# Patient Record
Sex: MALE | Race: WHITE
[De-identification: names, ages, dates, MRNs, and addresses within clinical notes are randomized per-mention and may not be internally consistent; named-entity substitution may affect disease eponyms.]

---

## 2019-11-21 ENCOUNTER — HOSPITAL ENCOUNTER (INPATIENT)
Dept: HOSPITAL 63 - ER | Age: 69
LOS: 67 days | Discharge: NURSING, REHAB OR LONG-TERM CARE | DRG: 885 | End: 2020-01-27
Attending: PSYCHIATRY & NEUROLOGY | Admitting: PSYCHIATRY & NEUROLOGY
Payer: MEDICARE

## 2019-11-21 VITALS — BODY MASS INDEX: 23.98 KG/M2 | WEIGHT: 177 LBS | HEIGHT: 72 IN

## 2019-11-21 DIAGNOSIS — Z88.8: ICD-10-CM

## 2019-11-21 DIAGNOSIS — Z79.899: ICD-10-CM

## 2019-11-21 DIAGNOSIS — K21.9: ICD-10-CM

## 2019-11-21 DIAGNOSIS — I10: ICD-10-CM
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
## 2019-11-21 DIAGNOSIS — F63.9: ICD-10-CM

## 2019-11-21 DIAGNOSIS — G40.909: ICD-10-CM

## 2019-11-21 DIAGNOSIS — F02.81: ICD-10-CM

## 2019-11-21 DIAGNOSIS — G31.83: ICD-10-CM

## 2019-11-21 DIAGNOSIS — G30.9: ICD-10-CM
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
## 2019-11-21 DIAGNOSIS — F32.9: ICD-10-CM

## 2019-11-21 DIAGNOSIS — F01.51: ICD-10-CM

## 2019-11-21 DIAGNOSIS — F29: Primary | ICD-10-CM

## 2019-11-21 DIAGNOSIS — M19.90: ICD-10-CM
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
## 2019-11-21 DIAGNOSIS — Z88.1: ICD-10-CM

## 2019-11-21 DIAGNOSIS — E78.5: ICD-10-CM

## 2019-11-21 DIAGNOSIS — E03.9: ICD-10-CM

## 2019-11-21 DIAGNOSIS — F41.1: ICD-10-CM

## 2019-11-21 DIAGNOSIS — G47.33: ICD-10-CM
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
## 2019-11-21 LAB
ALBUMIN SERPL-MCNC: 3.4 G/DL (ref 3.4–5)
ALBUMIN/GLOB SERPL: 1.2 {RATIO} (ref 1–1.7)
ALP SERPL-CCNC: 77 U/L (ref 46–116)
ALT SERPL-CCNC: < 6 U/L (ref 16–63)
ANION GAP SERPL CALC-SCNC: 7 MMOL/L (ref 6–14)
APTT PPP: YELLOW S
AST SERPL-CCNC: 11 U/L (ref 15–37)
BACTERIA #/AREA URNS HPF: 0 /HPF
BASOPHILS # BLD AUTO: 0 X10^3/UL (ref 0–0.2)
BASOPHILS NFR BLD: 1 % (ref 0–3)
BILIRUB SERPL-MCNC: 0.4 MG/DL (ref 0.2–1)
BILIRUB UR QL STRIP: (no result)
BUN/CREAT SERPL: 29 (ref 6–20)
CA-I SERPL ISE-MCNC: 23 MG/DL (ref 8–26)
CALCIUM SERPL-MCNC: 8.7 MG/DL (ref 8.5–10.1)
CHLORIDE SERPL-SCNC: 108 MMOL/L (ref 98–107)
CO2 SERPL-SCNC: 29 MMOL/L (ref 21–32)
CREAT SERPL-MCNC: 0.8 MG/DL (ref 0.7–1.3)
EOSINOPHIL NFR BLD: 0.4 X10^3/UL (ref 0–0.7)
EOSINOPHIL NFR BLD: 8 % (ref 0–3)
ERYTHROCYTE [DISTWIDTH] IN BLOOD BY AUTOMATED COUNT: 13.9 % (ref 11.5–14.5)
FIBRINOGEN PPP-MCNC: CLEAR MG/DL
GFR SERPLBLD BASED ON 1.73 SQ M-ARVRAT: 96.4 ML/MIN
GLOBULIN SER-MCNC: 2.8 G/DL (ref 2.2–3.8)
GLUCOSE SERPL-MCNC: 95 MG/DL (ref 70–99)
GLUCOSE UR STRIP-MCNC: (no result) MG/DL
HCT VFR BLD CALC: 38.2 % (ref 39–53)
HGB BLD-MCNC: 12.7 G/DL (ref 13–17.5)
LYMPHOCYTES # BLD: 0.8 X10^3/UL (ref 1–4.8)
LYMPHOCYTES NFR BLD AUTO: 18 % (ref 24–48)
MAGNESIUM SERPL-MCNC: 2 MG/DL (ref 1.8–2.4)
MCH RBC QN AUTO: 30 PG (ref 25–35)
MCHC RBC AUTO-ENTMCNC: 33 G/DL (ref 31–37)
MCV RBC AUTO: 90 FL (ref 79–100)
MONO #: 0.4 X10^3/UL (ref 0–1.1)
MONOCYTES NFR BLD: 10 % (ref 0–9)
NEUT #: 3 X10^3UL (ref 1.8–7.7)
NEUTROPHILS NFR BLD AUTO: 64 % (ref 31–73)
NITRITE UR QL STRIP: (no result)
PLATELET # BLD AUTO: 176 X10^3/UL (ref 140–400)
POTASSIUM SERPL-SCNC: 4.4 MMOL/L (ref 3.5–5.1)
PROT SERPL-MCNC: 6.2 G/DL (ref 6.4–8.2)
RBC # BLD AUTO: 4.25 X10^6/UL (ref 4.3–5.7)
RBC #/AREA URNS HPF: 0 /HPF (ref 0–2)
SODIUM SERPL-SCNC: 144 MMOL/L (ref 136–145)
SP GR UR STRIP: 1.02
UROBILINOGEN UR-MCNC: 0.2 MG/DL
WBC # BLD AUTO: 4.7 X10^3/UL (ref 4–11)
WBC #/AREA URNS HPF: 0 /HPF (ref 0–4)

## 2019-11-21 PROCEDURE — 86592 SYPHILIS TEST NON-TREP QUAL: CPT

## 2019-11-21 PROCEDURE — 85025 COMPLETE CBC W/AUTO DIFF WBC: CPT

## 2019-11-21 PROCEDURE — 36415 COLL VENOUS BLD VENIPUNCTURE: CPT

## 2019-11-21 PROCEDURE — 70450 CT HEAD/BRAIN W/O DYE: CPT

## 2019-11-21 PROCEDURE — 80053 COMPREHEN METABOLIC PANEL: CPT

## 2019-11-21 PROCEDURE — 83735 ASSAY OF MAGNESIUM: CPT

## 2019-11-21 PROCEDURE — 80164 ASSAY DIPROPYLACETIC ACD TOT: CPT

## 2019-11-21 PROCEDURE — 83550 IRON BINDING TEST: CPT

## 2019-11-21 PROCEDURE — 84480 ASSAY TRIIODOTHYRONINE (T3): CPT

## 2019-11-21 PROCEDURE — 83036 HEMOGLOBIN GLYCOSYLATED A1C: CPT

## 2019-11-21 PROCEDURE — 84443 ASSAY THYROID STIM HORMONE: CPT

## 2019-11-21 PROCEDURE — 82306 VITAMIN D 25 HYDROXY: CPT

## 2019-11-21 PROCEDURE — 80061 LIPID PANEL: CPT

## 2019-11-21 PROCEDURE — 82607 VITAMIN B-12: CPT

## 2019-11-21 PROCEDURE — 93005 ELECTROCARDIOGRAM TRACING: CPT

## 2019-11-21 PROCEDURE — 84436 ASSAY OF TOTAL THYROXINE: CPT

## 2019-11-21 PROCEDURE — 83540 ASSAY OF IRON: CPT

## 2019-11-21 PROCEDURE — 86021 WBC ANTIBODY IDENTIFICATION: CPT

## 2019-11-21 PROCEDURE — 81001 URINALYSIS AUTO W/SCOPE: CPT

## 2019-11-21 RX ADMIN — CARBIDOPA AND LEVODOPA SCH TAB: 25; 100 TABLET ORAL at 22:00

## 2019-11-21 RX ADMIN — MELATONIN TAB 3 MG SCH MG: 3 TAB at 22:00

## 2019-11-21 RX ADMIN — POLYETHYLENE GLYCOL 3350 SCH GM: 17 POWDER, FOR SOLUTION ORAL at 22:00

## 2019-11-21 RX ADMIN — QUETIAPINE FUMARATE SCH MG: 100 TABLET, FILM COATED ORAL at 22:00

## 2019-11-21 RX ADMIN — CARBIDOPA AND LEVODOPA SCH TAB.SA: 50; 200 TABLET, EXTENDED RELEASE ORAL at 22:00
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
## 2019-11-22 VITALS — DIASTOLIC BLOOD PRESSURE: 106 MMHG | SYSTOLIC BLOOD PRESSURE: 169 MMHG

## 2019-11-22 VITALS — SYSTOLIC BLOOD PRESSURE: 162 MMHG | DIASTOLIC BLOOD PRESSURE: 100 MMHG

## 2019-11-22 LAB
CHOLEST/HDLC SERPL: 3 {RATIO}
HDLC SERPL-MCNC: 49 MG/DL (ref 40–60)
LDLC: 94 MG/DL (ref 0–100)
T3 SERPL-MCNC: 88 NG/DL (ref 71–180)
T4 SERPL-MCNC: 4.8 UG/DL (ref 4.5–12)
THYROID STIM HORMONE (TSH): 0.64 UIU/ML (ref 0.36–3.74)
TRIGL SERPL-MCNC: 70 MG/DL (ref 0–150)
VLDLC: 14 MG/DL (ref 0–40)

## 2019-11-22 RX ADMIN — VITAMIN D, TAB 1000IU (100/BT) SCH UNIT: 25 TAB at 09:00

## 2019-11-22 RX ADMIN — QUETIAPINE FUMARATE SCH MG: 25 TABLET, FILM COATED ORAL at 09:00

## 2019-11-22 RX ADMIN — CARBIDOPA AND LEVODOPA SCH TAB.SA: 50; 200 TABLET, EXTENDED RELEASE ORAL at 20:57

## 2019-11-22 RX ADMIN — DONEPEZIL HYDROCHLORIDE SCH MG: 5 TABLET, FILM COATED ORAL at 09:00

## 2019-11-22 RX ADMIN — POLYETHYLENE GLYCOL 3350 SCH GM: 17 POWDER, FOR SOLUTION ORAL at 09:00

## 2019-11-22 RX ADMIN — CARBIDOPA AND LEVODOPA SCH TAB: 25; 100 TABLET ORAL at 10:00

## 2019-11-22 RX ADMIN — POLYETHYLENE GLYCOL 3350 SCH GM: 17 POWDER, FOR SOLUTION ORAL at 20:57

## 2019-11-22 RX ADMIN — CARBIDOPA AND LEVODOPA SCH TAB: 25; 100 TABLET ORAL at 14:00

## 2019-11-22 RX ADMIN — CARBIDOPA AND LEVODOPA SCH TAB: 25; 100 TABLET ORAL at 05:30

## 2019-11-22 RX ADMIN — MELATONIN TAB 3 MG SCH MG: 3 TAB at 20:57

## 2019-11-22 RX ADMIN — Medication SCH MCG: at 09:00

## 2019-11-22 RX ADMIN — ENTACAPONE SCH MG: 200 TABLET, FILM COATED ORAL at 20:57

## 2019-11-22 RX ADMIN — CARBIDOPA AND LEVODOPA SCH TAB: 25; 100 TABLET ORAL at 22:00

## 2019-11-22 RX ADMIN — CARBIDOPA AND LEVODOPA SCH TAB.SA: 50; 200 TABLET, EXTENDED RELEASE ORAL at 09:00

## 2019-11-22 RX ADMIN — Medication SCH MG: at 09:00

## 2019-11-22 RX ADMIN — QUETIAPINE FUMARATE SCH MG: 100 TABLET, FILM COATED ORAL at 20:57

## 2019-11-22 RX ADMIN — QUETIAPINE FUMARATE SCH MG: 25 TABLET, FILM COATED ORAL at 14:00

## 2019-11-22 RX ADMIN — CARBIDOPA AND LEVODOPA SCH TAB: 25; 100 TABLET ORAL at 16:57

## 2019-11-22 RX ADMIN — FINASTERIDE SCH MG: 5 TABLET, FILM COATED ORAL at 09:00

## 2019-11-22 RX ADMIN — MELOXICAM SCH MG: 7.5 TABLET ORAL at 09:00

## 2019-11-22 RX ADMIN — CITALOPRAM HYDROBROMIDE SCH MG: 20 TABLET ORAL at 09:00

## 2019-11-22 RX ADMIN — ENTACAPONE SCH MG: 200 TABLET, FILM COATED ORAL at 09:00

## 2019-11-22 NOTE — PDOC
Exam


Note:


Aren Note:


Please also refer to the separate dictated note~for this date of service 

dictated separately. Discussed the patient with Nursing staff reviewed the 

chart.~Reviewed interim history and current functioning. Reviewed vital 

signs,~Labs/ Radiology~and current medications noted below. Continue current 

treatment with the changes noted in the dictated addendum note





Assessment:


Vital Signs/I&O:





                                   Vital Signs








  Date Time  Temp Pulse Resp B/P (MAP) Pulse Ox O2 Delivery O2 Flow Rate FiO2


 


11/22/19 09:00  83  169/106    


 


11/22/19 06:31 97.2  18  98   


 


11/21/19 18:35      Room Air  














                                    I & O   


 


 11/21/19 11/21/19 11/22/19





 15:00 23:00 07:00


 


Intake Total  120 ml 


 


Balance  120 ml 











Current Medications:


Meds:





Current Medications








 Medications


  (Trade)  Dose


 Ordered  Sig/Elijah


 Route


 PRN Reason  Start Time


 Stop Time Status Last Admin


Dose Admin


 


 Olanzapine


  (ZyPREXA ZYDIS)  2.5 mg  PRN Q2HR  PRN


 PO


 PSYCHOSIS  11/22/19 07:45


    11/22/19 15:02





 


 Lorazepam


  (Ativan Inj)  2 mg  DAILY


 IVP


   11/23/19 09:00


    11/22/19 09:05





 


 Lorazepam


  (Ativan Inj)  1 mg  1X  ONCE


 IM


   11/22/19 09:30


 11/22/19 09:31 DC 11/22/19 09:30











I have reviewed the current psychotropics carefully including drug interactions.

 Risk benefit ratio favors no change other than as noted in my dictated progress

note.





Diagnosis:


Problems:  


(1) Anxiety disorder


(2) Impulse control disorder


(3) Lewy body dementia with behavioral disturbance


(4) Major neurocognitive disorder, due to vascular disease, with behavioral 

disturbance, mild











MIRIAM JACOBS MD                 Nov 22, 2019 20:42

## 2019-11-23 VITALS — SYSTOLIC BLOOD PRESSURE: 136 MMHG | DIASTOLIC BLOOD PRESSURE: 83 MMHG

## 2019-11-23 VITALS — DIASTOLIC BLOOD PRESSURE: 89 MMHG | SYSTOLIC BLOOD PRESSURE: 144 MMHG

## 2019-11-23 LAB — HBA1C MFR BLD: 5.3 % (ref 4.8–5.6)

## 2019-11-23 RX ADMIN — TRAZODONE HYDROCHLORIDE SCH MG: 100 TABLET ORAL at 21:31

## 2019-11-23 RX ADMIN — QUETIAPINE FUMARATE SCH MG: 100 TABLET, FILM COATED ORAL at 21:30

## 2019-11-23 RX ADMIN — CARBIDOPA AND LEVODOPA SCH TAB: 25; 100 TABLET ORAL at 17:00

## 2019-11-23 RX ADMIN — DONEPEZIL HYDROCHLORIDE SCH MG: 5 TABLET, FILM COATED ORAL at 08:07

## 2019-11-23 RX ADMIN — Medication SCH MCG: at 08:08

## 2019-11-23 RX ADMIN — MELATONIN TAB 3 MG SCH MG: 3 TAB at 21:31

## 2019-11-23 RX ADMIN — Medication SCH MG: at 09:00

## 2019-11-23 RX ADMIN — CITALOPRAM HYDROBROMIDE SCH MG: 20 TABLET ORAL at 08:08

## 2019-11-23 RX ADMIN — VITAMIN D, TAB 1000IU (100/BT) SCH UNIT: 25 TAB at 08:07

## 2019-11-23 RX ADMIN — CARBIDOPA AND LEVODOPA SCH TAB: 25; 100 TABLET ORAL at 08:12

## 2019-11-23 RX ADMIN — CARBIDOPA AND LEVODOPA SCH TAB: 25; 100 TABLET ORAL at 06:00

## 2019-11-23 RX ADMIN — FINASTERIDE SCH MG: 5 TABLET, FILM COATED ORAL at 08:07

## 2019-11-23 RX ADMIN — ENTACAPONE SCH MG: 200 TABLET, FILM COATED ORAL at 21:52

## 2019-11-23 RX ADMIN — CARBIDOPA AND LEVODOPA SCH TAB.SA: 50; 200 TABLET, EXTENDED RELEASE ORAL at 21:31

## 2019-11-23 RX ADMIN — POLYETHYLENE GLYCOL 3350 SCH GM: 17 POWDER, FOR SOLUTION ORAL at 08:06

## 2019-11-23 RX ADMIN — QUETIAPINE FUMARATE SCH MG: 25 TABLET, FILM COATED ORAL at 08:06

## 2019-11-23 RX ADMIN — QUETIAPINE FUMARATE SCH MG: 25 TABLET, FILM COATED ORAL at 13:47

## 2019-11-23 RX ADMIN — CARBIDOPA AND LEVODOPA SCH TAB: 25; 100 TABLET ORAL at 21:35

## 2019-11-23 RX ADMIN — CARBIDOPA AND LEVODOPA SCH TAB.SA: 50; 200 TABLET, EXTENDED RELEASE ORAL at 08:08

## 2019-11-23 RX ADMIN — POLYETHYLENE GLYCOL 3350 SCH GM: 17 POWDER, FOR SOLUTION ORAL at 21:52

## 2019-11-23 RX ADMIN — MELOXICAM SCH MG: 7.5 TABLET ORAL at 08:08

## 2019-11-23 RX ADMIN — CARBIDOPA AND LEVODOPA SCH TAB: 25; 100 TABLET ORAL at 13:47

## 2019-11-23 RX ADMIN — ENTACAPONE SCH MG: 200 TABLET, FILM COATED ORAL at 08:08

## 2019-11-23 NOTE — PDOC
Exam


Note:


Aren Note:


Please also refer to the separate dictated note~for this date of service 

dictated separately.~Patient seen individually. Discussed the patient with 

Nursing staff reviewed the chart.~Reviewed interim history and current 

functioning. Reviewed vital signs,~Labs/ Radiology~and current medications noted

below. Continue current treatment with the changes noted in the dictated 

addendum note





Assessment:


Vital Signs/I&O:





                                   Vital Signs








  Date Time  Temp Pulse Resp B/P (MAP) Pulse Ox O2 Delivery O2 Flow Rate FiO2


 


11/23/19 17:00  80  136/83    


 


11/23/19 16:04 97.7  14  98   


 


11/23/19 06:08      Room Air  














                                    I & O   


 


 11/22/19 11/22/19 11/23/19





 15:00 23:00 07:00


 


Intake Total 0 ml 0 ml 


 


Balance 0 ml 0 ml 











Current Medications:


Meds:





Current Medications








 Medications


  (Trade)  Dose


 Ordered  Sig/Elijah


 Route


 PRN Reason  Start Time


 Stop Time Status Last Admin


Dose Admin


 


 Lorazepam


  (Ativan Inj)  2 mg  DAILY


 IVP


   11/23/19 09:00


 11/23/19 18:56 DC 11/22/19 09:05





 


 Lorazepam


  (Ativan Inj)  2 mg  1X  ONCE


 IM


   11/23/19 09:00


 11/23/19 09:33 DC 11/23/19 09:00





 


 Lorazepam


  (Ativan Inj)  1 mg  BID


 IVP


   11/23/19 19:00


 11/23/19 19:06 DC 11/23/19 19:00





 


 Haloperidol


 Lactate


  (Haldol)  5 mg  1X  ONCE


 IM


   11/23/19 19:00


 11/23/19 19:03 DC 11/23/19 19:00











I have reviewed the current psychotropics carefully including drug interactions.

 Risk benefit ratio favors no change other than as noted in my dictated progress

note.





Diagnosis:


Problems:  


(1) Anxiety disorder


(2) Impulse control disorder


(3) Lewy body dementia with behavioral disturbance


(4) Major neurocognitive disorder, due to vascular disease, with behavioral 

disturbance, mild











MIRIAM JACOBS MD                 Nov 23, 2019 20:45

## 2019-11-24 VITALS — SYSTOLIC BLOOD PRESSURE: 142 MMHG | DIASTOLIC BLOOD PRESSURE: 84 MMHG

## 2019-11-24 VITALS — SYSTOLIC BLOOD PRESSURE: 137 MMHG | DIASTOLIC BLOOD PRESSURE: 46 MMHG

## 2019-11-24 RX ADMIN — ENTACAPONE SCH MG: 200 TABLET, FILM COATED ORAL at 07:56

## 2019-11-24 RX ADMIN — CARBIDOPA AND LEVODOPA SCH TAB: 25; 100 TABLET ORAL at 05:01

## 2019-11-24 RX ADMIN — CARBIDOPA AND LEVODOPA SCH TAB: 25; 100 TABLET ORAL at 18:00

## 2019-11-24 RX ADMIN — FINASTERIDE SCH MG: 5 TABLET, FILM COATED ORAL at 07:55

## 2019-11-24 RX ADMIN — CARBIDOPA AND LEVODOPA SCH TAB: 25; 100 TABLET ORAL at 12:48

## 2019-11-24 RX ADMIN — CITALOPRAM HYDROBROMIDE SCH MG: 20 TABLET ORAL at 07:55

## 2019-11-24 RX ADMIN — POLYETHYLENE GLYCOL 3350 SCH GM: 17 POWDER, FOR SOLUTION ORAL at 21:21

## 2019-11-24 RX ADMIN — Medication SCH MCG: at 07:55

## 2019-11-24 RX ADMIN — CARBIDOPA AND LEVODOPA SCH TAB: 25; 100 TABLET ORAL at 21:22

## 2019-11-24 RX ADMIN — CARBIDOPA AND LEVODOPA SCH TAB: 25; 100 TABLET ORAL at 08:00

## 2019-11-24 RX ADMIN — CARBIDOPA AND LEVODOPA SCH TAB.SA: 50; 200 TABLET, EXTENDED RELEASE ORAL at 07:55

## 2019-11-24 RX ADMIN — VALPROIC ACID SCH MG: 250 SOLUTION ORAL at 21:00

## 2019-11-24 RX ADMIN — CARBIDOPA AND LEVODOPA SCH TAB.SA: 25; 100 TABLET, EXTENDED RELEASE ORAL at 21:00

## 2019-11-24 RX ADMIN — HALOPERIDOL LACTATE SCH MG: 5 INJECTION, SOLUTION INTRAMUSCULAR at 09:00

## 2019-11-24 RX ADMIN — QUETIAPINE FUMARATE SCH MG: 100 TABLET, FILM COATED ORAL at 21:19

## 2019-11-24 RX ADMIN — QUETIAPINE FUMARATE SCH MG: 25 TABLET, FILM COATED ORAL at 12:48

## 2019-11-24 RX ADMIN — MELOXICAM SCH MG: 7.5 TABLET ORAL at 07:55

## 2019-11-24 RX ADMIN — QUETIAPINE FUMARATE SCH MG: 25 TABLET, FILM COATED ORAL at 07:54

## 2019-11-24 RX ADMIN — MELATONIN TAB 3 MG SCH MG: 3 TAB at 21:19

## 2019-11-24 RX ADMIN — VITAMIN D, TAB 1000IU (100/BT) SCH UNIT: 25 TAB at 07:55

## 2019-11-24 RX ADMIN — CARBIDOPA AND LEVODOPA SCH TAB.SA: 50; 200 TABLET, EXTENDED RELEASE ORAL at 09:00

## 2019-11-24 RX ADMIN — DONEPEZIL HYDROCHLORIDE SCH MG: 5 TABLET, FILM COATED ORAL at 07:55

## 2019-11-24 RX ADMIN — ENTACAPONE SCH MG: 200 TABLET, FILM COATED ORAL at 21:19

## 2019-11-24 RX ADMIN — POLYETHYLENE GLYCOL 3350 SCH GM: 17 POWDER, FOR SOLUTION ORAL at 07:54

## 2019-11-24 RX ADMIN — Medication SCH MG: at 07:56

## 2019-11-24 RX ADMIN — TRAZODONE HYDROCHLORIDE SCH MG: 100 TABLET ORAL at 21:20

## 2019-11-24 RX ADMIN — CARBIDOPA AND LEVODOPA SCH TAB.SA: 50; 200 TABLET, EXTENDED RELEASE ORAL at 21:19
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
## 2019-11-24 NOTE — PDOC
Exam


Note:


Aren Note:


Please also refer to the separate dictated note~for this date of service 

dictated separately.~Patient seen individually. Discussed the patient with 

Nursing staff reviewed the chart.~Reviewed interim history and current 

functioning. Reviewed vital signs,~Labs/ Radiology~and current medications noted

below. Continue current treatment with the changes noted in the dictated 

addendum note





Assessment:


Vital Signs/I&O:





                                   Vital Signs








  Date Time  Temp Pulse Resp B/P (MAP) Pulse Ox O2 Delivery O2 Flow Rate FiO2


 


11/24/19 17:00  85  137/46    


 


11/24/19 15:43 98.2  18  96   


 


11/23/19 06:08      Room Air  














                                    I & O   


 


 11/23/19 11/23/19 11/24/19





 15:00 23:00 07:00


 


Intake Total 720 ml 240 ml 


 


Balance 720 ml 240 ml 











Current Medications:


Meds:





Current Medications








 Medications


  (Trade)  Dose


 Ordered  Sig/Elijah


 Route


 PRN Reason  Start Time


 Stop Time Status Last Admin


Dose Admin


 


 Trazodone HCl


  (Desyrel)  100 mg  QHS


 PO


   11/23/19 21:00


    11/23/19 21:31





 


 Haloperidol


 Lactate


  (Haldol)  5 mg  DAILY


 IVP


   11/24/19 09:00


    11/24/19 09:00





 


 Lorazepam


  (Ativan Inj)  1 mg  BID


 IM


   11/24/19 09:00


    11/24/19 10:30











I have reviewed the current psychotropics carefully including drug interactions.

 Risk benefit ratio favors no change other than as noted in my dictated progress

note.





Diagnosis:


Problems:  


(1) Anxiety disorder


(2) Impulse control disorder


(3) Lewy body dementia with behavioral disturbance


(4) Major neurocognitive disorder, due to vascular disease, with behavioral 

disturbance, mild











MIRIAM JACOBS MD                 Nov 24, 2019 20:02

## 2019-11-25 VITALS — SYSTOLIC BLOOD PRESSURE: 137 MMHG | DIASTOLIC BLOOD PRESSURE: 73 MMHG

## 2019-11-25 VITALS — DIASTOLIC BLOOD PRESSURE: 67 MMHG | SYSTOLIC BLOOD PRESSURE: 113 MMHG

## 2019-11-25 RX ADMIN — ENTACAPONE SCH MG: 200 TABLET, FILM COATED ORAL at 10:10

## 2019-11-25 RX ADMIN — CARBIDOPA AND LEVODOPA SCH TAB: 25; 100 TABLET ORAL at 17:20

## 2019-11-25 RX ADMIN — VALPROIC ACID SCH MG: 250 SOLUTION ORAL at 21:02

## 2019-11-25 RX ADMIN — QUETIAPINE FUMARATE SCH MG: 25 TABLET, FILM COATED ORAL at 10:10

## 2019-11-25 RX ADMIN — DONEPEZIL HYDROCHLORIDE SCH MG: 5 TABLET, FILM COATED ORAL at 10:11

## 2019-11-25 RX ADMIN — TRAZODONE HYDROCHLORIDE SCH MG: 100 TABLET ORAL at 21:02

## 2019-11-25 RX ADMIN — CARBIDOPA AND LEVODOPA SCH TAB.SA: 25; 100 TABLET, EXTENDED RELEASE ORAL at 21:02

## 2019-11-25 RX ADMIN — CARBIDOPA AND LEVODOPA SCH TAB: 25; 100 TABLET ORAL at 21:02

## 2019-11-25 RX ADMIN — QUETIAPINE FUMARATE SCH MG: 25 TABLET, FILM COATED ORAL at 13:19

## 2019-11-25 RX ADMIN — Medication SCH MG: at 10:12

## 2019-11-25 RX ADMIN — ENTACAPONE SCH MG: 200 TABLET, FILM COATED ORAL at 21:00

## 2019-11-25 RX ADMIN — CARBIDOPA AND LEVODOPA SCH TAB: 25; 100 TABLET ORAL at 13:20

## 2019-11-25 RX ADMIN — MELOXICAM SCH MG: 7.5 TABLET ORAL at 10:10

## 2019-11-25 RX ADMIN — Medication SCH MCG: at 10:10

## 2019-11-25 RX ADMIN — POLYETHYLENE GLYCOL 3350 SCH GM: 17 POWDER, FOR SOLUTION ORAL at 10:09

## 2019-11-25 RX ADMIN — POLYETHYLENE GLYCOL 3350 SCH GM: 17 POWDER, FOR SOLUTION ORAL at 21:02

## 2019-11-25 RX ADMIN — FINASTERIDE SCH MG: 5 TABLET, FILM COATED ORAL at 10:10

## 2019-11-25 RX ADMIN — VITAMIN D, TAB 1000IU (100/BT) SCH UNIT: 25 TAB at 10:11

## 2019-11-25 RX ADMIN — CARBIDOPA AND LEVODOPA SCH TAB.SA: 50; 200 TABLET, EXTENDED RELEASE ORAL at 10:11

## 2019-11-25 RX ADMIN — CITALOPRAM HYDROBROMIDE SCH MG: 20 TABLET ORAL at 10:11

## 2019-11-25 RX ADMIN — CARBIDOPA AND LEVODOPA SCH TAB: 25; 100 TABLET ORAL at 10:17

## 2019-11-25 RX ADMIN — QUETIAPINE FUMARATE SCH MG: 100 TABLET, FILM COATED ORAL at 21:02

## 2019-11-25 RX ADMIN — CARBIDOPA AND LEVODOPA SCH TAB: 25; 100 TABLET ORAL at 06:00

## 2019-11-25 RX ADMIN — HALOPERIDOL LACTATE SCH MG: 5 INJECTION, SOLUTION INTRAMUSCULAR at 09:00

## 2019-11-25 RX ADMIN — MELATONIN TAB 3 MG SCH MG: 3 TAB at 21:01

## 2019-11-25 NOTE — PN
DATE:  11/24/2019



PSYCHIATRIC PROGRESS NOTE



This late entry 11/24/2019 covers elements not covered in my initial note.



SUBJECTIVE:  I met with the patient evening of 11/24/2019 and discussed with

nursing staff several times earlier in the day.  The patient slept 6 hours

previous night.  He was seen by Dr. Stone and Sinemet reduced.  He has had a

little better day, received Zyprexa x 1, was climbing up on the chair over the

glass wall into the nursing station, but redirected when nursing staff

intervened.  He is still getting Haldol 5 mg IM daily and Ativan 2 mg IM daily

since oral psychotropics are being ineffective and we will stop the IMs as soon

as the oral psychotropics are effective.



REVIEW OF SYSTEMS:  No CV, , pulmonary, eye, ENT system symptoms on review. 

Reliability poor.



MENTAL STATUS EXAM:  Oriented to himself.  Insight, judgment, recent and remote

memory, attention, concentration, fund of knowledge poor, consistent with his

diagnosis mentioned in my initial note.



PLAN:  No change from initial note.





______________________________

MAN BRANDIN JACOBS MD



DR:  ISABELA/georgina  JOB#:  [id number] / [id number]

DD:  11/25/2019 12:54  DT:  11/25/2019 19:14

## 2019-11-25 NOTE — PN
DATE:  11/23/2019



This note covers the elements not covered in my initial note, 11/23/2019.



SUBJECTIVE:  The patient was seen individually evening of 11/23/2019 and

discussed with nursing staff several times during the day I was paged as an

emergency due to the patient's ongoing volatile behaviors despite his scheduled

Ativan 2 mg a day.  We did add Haldol 5 mg IM and trazodone 100 mg at bedtime

p.r.n., may repeat x 1.  Additionally, the patient has a history of seizure

disorder.  We will initiate Depakote 500 mg at bedtime.  Check CBC, CMP, and

valproic acid level in 3 days.  Consult Dr. Stone to see if we can reduce his

dosage of Sinemet and other anti-parkinsonian medications since these are

probably worsening the psychosis and he does not seem to be impeded by his

Parkinson's at this stage at least.



REVIEW OF SYSTEMS:  No CV, , pulmonary, eye, or ENT system symptoms on review.

 Reliability poor.



MENTAL STATUS EXAM:  Oriented to himself.  Insight, judgment, recent and remote

memory, attention, concentration, and fund of knowledge poor and consistent with

his diagnosis mentioned in my initial note.



IMPRESSION:  Major neurocognitive disorder, Lewy body with delusion, depression,

behavioral disturbance, anxiety disorder, unspecified, impulse control disorder,

unspecified.



PLAN:  As noted above and I have had a very detailed review of his history and

past treatments and formulated the above plans for now.





______________________________

MAN BRANDIN JACOBS MD



DR:  ISABELA/georgina  JOB#:  [id number] / [id number]

DD:  11/23/2019 20:35  DT:  11/23/2019 23:32

## 2019-11-25 NOTE — PDOC
Exam


Note:


Aren Note:


Please also refer to the separate dictated note~for this date of service 

dictated separately.~Patient seen individually. Discussed the patient with 

Nursing staff reviewed the chart.~Reviewed interim history and current 

functioning. Reviewed vital signs,~Labs/ Radiology~and current medications noted

below. Continue current treatment with the changes noted in the dictated 

addendum note





Assessment:


Vital Signs/I&O:





                                   Vital Signs








  Date Time  Temp Pulse Resp B/P (MAP) Pulse Ox O2 Delivery O2 Flow Rate FiO2


 


11/25/19 17:21  78  113/67    


 


11/25/19 15:57 98.0  16  97   


 


11/25/19 06:20      Room Air  














                                    I & O   


 


 11/24/19 11/24/19 11/25/19





 14:59 22:59 06:59


 


Intake Total 960 ml 320 ml 


 


Balance 960 ml 320 ml 











Current Medications:


I have reviewed the current psychotropics carefully including drug interactions.

 Risk benefit ratio favors no change other than as noted in my dictated progress

note.





Diagnosis:


Problems:  


(1) Anxiety disorder


(2) Impulse control disorder


(3) Lewy body dementia with behavioral disturbance


(4) Major neurocognitive disorder, due to vascular disease, with behavioral 

disturbance, mild











MIRIAM JACOBS MD                 Nov 25, 2019 19:56
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
## 2019-11-26 VITALS — SYSTOLIC BLOOD PRESSURE: 141 MMHG | DIASTOLIC BLOOD PRESSURE: 85 MMHG

## 2019-11-26 VITALS — SYSTOLIC BLOOD PRESSURE: 130 MMHG | DIASTOLIC BLOOD PRESSURE: 74 MMHG

## 2019-11-26 RX ADMIN — FINASTERIDE SCH MG: 5 TABLET, FILM COATED ORAL at 09:05

## 2019-11-26 RX ADMIN — CARBIDOPA AND LEVODOPA SCH TAB: 25; 100 TABLET ORAL at 17:18

## 2019-11-26 RX ADMIN — QUETIAPINE FUMARATE SCH MG: 25 TABLET, FILM COATED ORAL at 09:05

## 2019-11-26 RX ADMIN — Medication SCH MCG: at 09:05

## 2019-11-26 RX ADMIN — POLYETHYLENE GLYCOL 3350 SCH GM: 17 POWDER, FOR SOLUTION ORAL at 20:11

## 2019-11-26 RX ADMIN — ENTACAPONE SCH MG: 200 TABLET, FILM COATED ORAL at 20:10

## 2019-11-26 RX ADMIN — MELATONIN TAB 3 MG SCH MG: 3 TAB at 20:07

## 2019-11-26 RX ADMIN — QUETIAPINE FUMARATE SCH MG: 25 TABLET, FILM COATED ORAL at 14:00

## 2019-11-26 RX ADMIN — VITAMIN D, TAB 1000IU (100/BT) SCH UNIT: 25 TAB at 09:05

## 2019-11-26 RX ADMIN — CARBIDOPA AND LEVODOPA SCH TAB: 25; 100 TABLET ORAL at 14:00

## 2019-11-26 RX ADMIN — POLYETHYLENE GLYCOL 3350 SCH GM: 17 POWDER, FOR SOLUTION ORAL at 09:06

## 2019-11-26 RX ADMIN — CARBIDOPA AND LEVODOPA SCH TAB: 25; 100 TABLET ORAL at 14:30

## 2019-11-26 RX ADMIN — TRAZODONE HYDROCHLORIDE SCH MG: 100 TABLET ORAL at 20:10

## 2019-11-26 RX ADMIN — Medication SCH MG: at 09:04

## 2019-11-26 RX ADMIN — CARBIDOPA AND LEVODOPA SCH TAB: 25; 100 TABLET ORAL at 20:07

## 2019-11-26 RX ADMIN — DONEPEZIL HYDROCHLORIDE SCH MG: 5 TABLET, FILM COATED ORAL at 09:05

## 2019-11-26 RX ADMIN — CARBIDOPA AND LEVODOPA SCH TAB: 25; 100 TABLET ORAL at 05:49

## 2019-11-26 RX ADMIN — QUETIAPINE FUMARATE SCH MG: 100 TABLET, FILM COATED ORAL at 20:10

## 2019-11-26 RX ADMIN — CARBIDOPA AND LEVODOPA SCH TAB: 25; 100 TABLET ORAL at 09:06

## 2019-11-26 RX ADMIN — CITALOPRAM HYDROBROMIDE SCH MG: 20 TABLET ORAL at 09:06

## 2019-11-26 RX ADMIN — MELOXICAM SCH MG: 7.5 TABLET ORAL at 09:06

## 2019-11-26 RX ADMIN — CARBIDOPA AND LEVODOPA SCH TAB.SA: 25; 100 TABLET, EXTENDED RELEASE ORAL at 20:08

## 2019-11-26 RX ADMIN — ENTACAPONE SCH MG: 200 TABLET, FILM COATED ORAL at 09:05

## 2019-11-26 RX ADMIN — VALPROIC ACID SCH MG: 250 SOLUTION ORAL at 20:10

## 2019-11-26 NOTE — EKG
[facility] [address], [place] [address]

Test Date:    2019               Test Time:    18:56:29

Pat Name:     LANE SEN          Department:   

Patient ID:   [id number]           Room:         [address] [place] [address]

Gender:       M                        Technician:   

:          1950               Requested By: HAY MADRIGAL

Order Number: [id number]            [facility] MD:   Jesús Abdlala MD

                                 Measurements

Intervals                              Axis          

Rate:         65                       P:            33

NJ:           182                      QRS:          -5

QRSD:         90                       T:            8

QT:           396                                    

QTc:          413                                    

                           Interpretive Statements

SINUS RHYTHM



Electronically Signed On 2019 13:25:29 CST by Jesús Abdalla MD

## 2019-11-26 NOTE — PDOC
Exam


Note:


Aren Note:


Please also refer to the separate dictated note~for this date of service 

dictated separately.~Patient seen individually. Discussed the patient with 

Nursing staff reviewed the chart.~Reviewed interim history and current 

functioning. Reviewed vital signs,~Labs/ Radiology~and current medications noted

below. Continue current treatment with the changes noted in the dictated 

addendum note





Assessment:


Vital Signs/I&O:





                                   Vital Signs








  Date Time  Temp Pulse Resp B/P (MAP) Pulse Ox O2 Delivery O2 Flow Rate FiO2


 


11/26/19 17:18  81  130/74    


 


11/26/19 16:13 99.5  16  96 Room Air  














                                    I & O   


 


 11/25/19 11/25/19 11/26/19





 15:00 23:00 07:00


 


Intake Total 840 ml 360 ml 100 ml


 


Balance 840 ml 360 ml 100 ml











Current Medications:


I have reviewed the current psychotropics carefully including drug interactions.

 Risk benefit ratio favors no change other than as noted in my dictated progress

note.





Diagnosis:


Problems:  


(1) Anxiety disorder


(2) Impulse control disorder


(3) Lewy body dementia with behavioral disturbance


(4) Major neurocognitive disorder, due to vascular disease, with behavioral 

disturbance, mild











MIRIAM JACOBS MD                 Nov 26, 2019 19:58

## 2019-11-26 NOTE — PN
DATE:  11/25/2019



PSYCHIATRIC PROGRESS NOTE



This late entry 11/25/2019 covers elements not covered in my initial note.



SUBJECTIVE:  I met with the patient in the evening.  Per TARIK Castro, the

patient slept 7-3/4 hours previous night.  He has been more pleasant, remains

confused, less combative.  Compliant with medications, still restless, anxious,

received Zyprexa with a.m. medications.  The son visited the patient.  The

patient gets more agitated with the son around, perhaps wanting to leave the

unit.



REVIEW OF SYSTEMS:  No CV, , pulmonary, eye, ENT system symptoms on review. 

Reliability poor.



MENTAL STATUS EXAM:  Oriented to himself.  Insight, judgment, recent and remote

memory, attention, concentration, fund of knowledge poor, consistent with his

diagnosis.



IMPRESSION:  Major neurocognitive disorder, Lewy body with delusion, depression,

behavioral disturbance; anxiety disorder, unspecified; impulse control disorder,

unspecified.  Rest unchanged for now.



PLAN:  Continue current psychotropics.  We will stop the IM Haldol in about 2

days, maintain rest unchanged, and need to adjust the Depakene, which is

currently 500 mg at bedtime with a level due on 11/27/2019 along with CBC, CMP. 

Rest unchanged for now.





______________________________

MAN BRANDIN JACOBS MD



DR:  ISABELA/georgina  JOB#:  [id number] / [id number]

DD:  11/26/2019 17:04  DT:  11/26/2019 23:55

## 2019-11-27 VITALS — DIASTOLIC BLOOD PRESSURE: 85 MMHG | SYSTOLIC BLOOD PRESSURE: 158 MMHG

## 2019-11-27 LAB — VAL ACID: 24 MCG/ML (ref 50–100)

## 2019-11-27 RX ADMIN — CARBIDOPA AND LEVODOPA SCH TAB.SA: 50; 200 TABLET, EXTENDED RELEASE ORAL at 09:25

## 2019-11-27 RX ADMIN — Medication SCH MCG: at 09:26

## 2019-11-27 RX ADMIN — MELATONIN TAB 3 MG SCH MG: 3 TAB at 19:58

## 2019-11-27 RX ADMIN — Medication SCH MG: at 09:00

## 2019-11-27 RX ADMIN — QUETIAPINE FUMARATE SCH MG: 25 TABLET, FILM COATED ORAL at 13:29

## 2019-11-27 RX ADMIN — CARBIDOPA AND LEVODOPA SCH TAB: 25; 100 TABLET ORAL at 17:08

## 2019-11-27 RX ADMIN — ENTACAPONE SCH MG: 200 TABLET, FILM COATED ORAL at 09:25

## 2019-11-27 RX ADMIN — MIRTAZAPINE SCH MG: 15 TABLET, ORALLY DISINTEGRATING ORAL at 19:59

## 2019-11-27 RX ADMIN — VITAMIN D, TAB 1000IU (100/BT) SCH UNIT: 25 TAB at 09:25

## 2019-11-27 RX ADMIN — POLYETHYLENE GLYCOL 3350 SCH GM: 17 POWDER, FOR SOLUTION ORAL at 09:27

## 2019-11-27 RX ADMIN — POLYETHYLENE GLYCOL 3350 SCH GM: 17 POWDER, FOR SOLUTION ORAL at 19:58

## 2019-11-27 RX ADMIN — DONEPEZIL HYDROCHLORIDE SCH MG: 5 TABLET, FILM COATED ORAL at 09:25

## 2019-11-27 RX ADMIN — CARBIDOPA AND LEVODOPA SCH TAB: 25; 100 TABLET ORAL at 05:46

## 2019-11-27 RX ADMIN — CARBIDOPA AND LEVODOPA SCH TAB: 25; 100 TABLET ORAL at 11:49

## 2019-11-27 RX ADMIN — CARBIDOPA AND LEVODOPA SCH TAB.SA: 25; 100 TABLET, EXTENDED RELEASE ORAL at 19:57

## 2019-11-27 RX ADMIN — ENTACAPONE SCH MG: 200 TABLET, FILM COATED ORAL at 19:57

## 2019-11-27 RX ADMIN — CITALOPRAM HYDROBROMIDE SCH MG: 20 TABLET ORAL at 09:25

## 2019-11-27 RX ADMIN — QUETIAPINE FUMARATE SCH MG: 25 TABLET, FILM COATED ORAL at 09:26

## 2019-11-27 RX ADMIN — MELOXICAM SCH MG: 7.5 TABLET ORAL at 09:26

## 2019-11-27 RX ADMIN — CARBIDOPA AND LEVODOPA SCH TAB: 25; 100 TABLET ORAL at 13:29

## 2019-11-27 RX ADMIN — TRAZODONE HYDROCHLORIDE SCH MG: 100 TABLET ORAL at 19:58

## 2019-11-27 RX ADMIN — FINASTERIDE SCH MG: 5 TABLET, FILM COATED ORAL at 09:25

## 2019-11-27 RX ADMIN — VALPROIC ACID SCH MG: 250 SOLUTION ORAL at 19:57

## 2019-11-27 RX ADMIN — QUETIAPINE FUMARATE SCH MG: 100 TABLET, FILM COATED ORAL at 19:58

## 2019-11-27 RX ADMIN — CARBIDOPA AND LEVODOPA SCH TAB: 25; 100 TABLET ORAL at 19:58

## 2019-11-27 NOTE — PDOC
Exam


Note:


Aren Note:


Please also refer to the separate dictated note~for this date of service 

dictated separately.~Patient seen individually. Discussed the patient with 

Nursing staff reviewed the chart.~Reviewed interim history and current 

functioning. Reviewed vital signs,~Labs/ Radiology~and current medications noted

below. Continue current treatment with the changes noted in the dictated 

addendum note





Assessment:


Vital Signs/I&O:





                                   Vital Signs








  Date Time  Temp Pulse Resp B/P (MAP) Pulse Ox O2 Delivery O2 Flow Rate FiO2


 


11/27/19 17:00  84  158/85    


 


11/27/19 06:41 97.5  20  98   


 


11/26/19 16:13      Room Air  














                                    I & O   


 


 11/26/19 11/26/19 11/27/19





 15:00 23:00 07:00


 


Intake Total 960 ml 720 ml 


 


Balance 960 ml 720 ml 








Labs:





                                Laboratory Tests








Test


 11/27/19


06:08


 


Valproic Acid Level


 24 mcg/mL


()  L


 


Valproic Acid Last Dose Date 11/26/2019  


 


Valproic Acid Last Dose Time 2100  











Current Medications:


Meds:





Current Medications








 Medications


  (Trade)  Dose


 Ordered  Sig/Elijah


 Route


 PRN Reason  Start Time


 Stop Time Status Last Admin


Dose Admin


 


 Haloperidol


 Lactate


  (Haldol)  5 mg  1X  ONCE


 IM


   11/27/19 14:30


 11/27/19 14:34 DC 11/27/19 14:30











I have reviewed the current psychotropics carefully including drug interactions.

 Risk benefit ratio favors no change other than as noted in my dictated progress

note.





Diagnosis:


Problems:  


(1) Anxiety disorder


(2) Impulse control disorder


(3) Lewy body dementia with behavioral disturbance


(4) Major neurocognitive disorder, due to vascular disease, with behavioral 

disturbance, mild











MIRIAM JACOBS MD                 Nov 27, 2019 19:56

## 2019-11-27 NOTE — PN
DATE:  11/26/2019



PSYCHIATRIC PROGRESS NOTE



This late entry, 11/26, covers the elements not covered in my initial note.



SUBJECTIVE:  I met with the patient on the evening of 11/26 and staffed at a

treatment team meeting with the entire team in the morning.  The patient's

appetite 100%, slept 7 hours.  He has been much less aggressive.  The weekend

was very difficult with him breaking down several door locks as he was quite

psychotic, agitated.  We have since then stopped his IM Haldol.  He is confused,

but the psychosis is improved as the Sinemet has been reduced and he is back on

the Nuplazid as well.  Compliant with medications.  All his confusion has been

worsening over the past 4 months per historical information.



REVIEW OF SYSTEMS:  No CV, , pulmonary, eye, ENT systems symptoms on review.



MENTAL STATUS EXAM:  Oriented to himself.  Insight, judgment, recent and remote

memory, attention, concentration, fund of knowledge poor, consistent with his

diagnosis.



IMPRESSION:  Major neurocognitive disorder, Lewy body with delusion, depression,

behavioral disturbance; anxiety disorder, unspecified; impulse control disorder,

unspecified.  Rest unchanged.



PLAN:  Continue current psychotropics, Haldol IM will be stopped.  Maintain IM

Ativan for now.  Adjust rest of the psychotropics.  Depakene has been adjusted. 

Level will be checked on 11/27.  Defer management of Parkinson's to Dr. Stone,

Neurology.





______________________________

MAN BRANDIN JACOBS MD



DR:  ISABELA/georgina  JOB#:  [id number] / [id number]

DD:  11/27/2019 17:17  DT:  11/27/2019 21:24

## 2019-11-28 VITALS — DIASTOLIC BLOOD PRESSURE: 85 MMHG | SYSTOLIC BLOOD PRESSURE: 154 MMHG

## 2019-11-28 VITALS — SYSTOLIC BLOOD PRESSURE: 145 MMHG | DIASTOLIC BLOOD PRESSURE: 85 MMHG

## 2019-11-28 VITALS — DIASTOLIC BLOOD PRESSURE: 65 MMHG | SYSTOLIC BLOOD PRESSURE: 103 MMHG

## 2019-11-28 LAB
ALBUMIN SERPL-MCNC: 3.4 G/DL (ref 3.4–5)
ALBUMIN/GLOB SERPL: 1.1 {RATIO} (ref 1–1.7)
ALP SERPL-CCNC: 84 U/L (ref 46–116)
ALT SERPL-CCNC: < 6 U/L (ref 16–63)
ANION GAP SERPL CALC-SCNC: 7 MMOL/L (ref 6–14)
AST SERPL-CCNC: 14 U/L (ref 15–37)
BASOPHILS # BLD AUTO: 0 X10^3/UL (ref 0–0.2)
BASOPHILS NFR BLD: 1 % (ref 0–3)
BILIRUB SERPL-MCNC: 0.4 MG/DL (ref 0.2–1)
BUN/CREAT SERPL: 23 (ref 6–20)
CA-I SERPL ISE-MCNC: 21 MG/DL (ref 8–26)
CALCIUM SERPL-MCNC: 8.8 MG/DL (ref 8.5–10.1)
CHLORIDE SERPL-SCNC: 108 MMOL/L (ref 98–107)
CO2 SERPL-SCNC: 27 MMOL/L (ref 21–32)
CREAT SERPL-MCNC: 0.9 MG/DL (ref 0.7–1.3)
EOSINOPHIL NFR BLD: 0.4 X10^3/UL (ref 0–0.7)
EOSINOPHIL NFR BLD: 8 % (ref 0–3)
ERYTHROCYTE [DISTWIDTH] IN BLOOD BY AUTOMATED COUNT: 13.5 % (ref 11.5–14.5)
GFR SERPLBLD BASED ON 1.73 SQ M-ARVRAT: 84.2 ML/MIN
GLOBULIN SER-MCNC: 3.1 G/DL (ref 2.2–3.8)
GLUCOSE SERPL-MCNC: 95 MG/DL (ref 70–99)
HCT VFR BLD CALC: 39.4 % (ref 39–53)
HGB BLD-MCNC: 13.2 G/DL (ref 13–17.5)
LYMPHOCYTES # BLD: 0.6 X10^3/UL (ref 1–4.8)
LYMPHOCYTES NFR BLD AUTO: 13 % (ref 24–48)
MCH RBC QN AUTO: 30 PG (ref 25–35)
MCHC RBC AUTO-ENTMCNC: 34 G/DL (ref 31–37)
MCV RBC AUTO: 89 FL (ref 79–100)
MONO #: 0.5 X10^3/UL (ref 0–1.1)
MONOCYTES NFR BLD: 11 % (ref 0–9)
NEUT #: 2.9 X10^3UL (ref 1.8–7.7)
NEUTROPHILS NFR BLD AUTO: 67 % (ref 31–73)
PLATELET # BLD AUTO: 212 X10^3/UL (ref 140–400)
POTASSIUM SERPL-SCNC: 4.4 MMOL/L (ref 3.5–5.1)
PROT SERPL-MCNC: 6.5 G/DL (ref 6.4–8.2)
RBC # BLD AUTO: 4.43 X10^6/UL (ref 4.3–5.7)
SODIUM SERPL-SCNC: 142 MMOL/L (ref 136–145)
WBC # BLD AUTO: 4.2 X10^3/UL (ref 4–11)

## 2019-11-28 RX ADMIN — MIRTAZAPINE SCH MG: 15 TABLET, ORALLY DISINTEGRATING ORAL at 21:20

## 2019-11-28 RX ADMIN — MELOXICAM SCH MG: 7.5 TABLET ORAL at 08:41

## 2019-11-28 RX ADMIN — CHOLECALCIFEROL CAP 1.25 MG (50000 UNIT) SCH UNIT: 1.25 CAP at 10:08

## 2019-11-28 RX ADMIN — QUETIAPINE FUMARATE SCH MG: 25 TABLET, FILM COATED ORAL at 08:41

## 2019-11-28 RX ADMIN — CARBIDOPA AND LEVODOPA SCH TAB.SA: 25; 100 TABLET, EXTENDED RELEASE ORAL at 21:19

## 2019-11-28 RX ADMIN — VALPROIC ACID SCH MG: 250 SOLUTION ORAL at 21:18

## 2019-11-28 RX ADMIN — QUETIAPINE FUMARATE SCH MG: 100 TABLET, FILM COATED ORAL at 21:20

## 2019-11-28 RX ADMIN — DONEPEZIL HYDROCHLORIDE SCH MG: 5 TABLET, FILM COATED ORAL at 08:41

## 2019-11-28 RX ADMIN — POLYETHYLENE GLYCOL 3350 SCH GM: 17 POWDER, FOR SOLUTION ORAL at 21:22

## 2019-11-28 RX ADMIN — ENTACAPONE SCH MG: 200 TABLET, FILM COATED ORAL at 21:18

## 2019-11-28 RX ADMIN — ENTACAPONE SCH MG: 200 TABLET, FILM COATED ORAL at 08:42

## 2019-11-28 RX ADMIN — POLYETHYLENE GLYCOL 3350 SCH GM: 17 POWDER, FOR SOLUTION ORAL at 08:42

## 2019-11-28 RX ADMIN — QUETIAPINE FUMARATE SCH MG: 25 TABLET, FILM COATED ORAL at 13:24

## 2019-11-28 RX ADMIN — HALOPERIDOL LACTATE SCH MG: 5 INJECTION, SOLUTION INTRAMUSCULAR at 11:56

## 2019-11-28 RX ADMIN — CARBIDOPA AND LEVODOPA SCH TAB: 25; 100 TABLET ORAL at 05:40

## 2019-11-28 RX ADMIN — CARBIDOPA AND LEVODOPA SCH TAB: 25; 100 TABLET ORAL at 21:32

## 2019-11-28 RX ADMIN — CARBIDOPA AND LEVODOPA SCH TAB: 25; 100 TABLET ORAL at 10:07

## 2019-11-28 RX ADMIN — CITALOPRAM HYDROBROMIDE SCH MG: 20 TABLET ORAL at 08:42

## 2019-11-28 RX ADMIN — MELATONIN TAB 3 MG SCH MG: 3 TAB at 21:20

## 2019-11-28 RX ADMIN — CARBIDOPA AND LEVODOPA SCH TAB.SA: 50; 200 TABLET, EXTENDED RELEASE ORAL at 08:41

## 2019-11-28 RX ADMIN — Medication SCH MCG: at 08:40

## 2019-11-28 RX ADMIN — FINASTERIDE SCH MG: 5 TABLET, FILM COATED ORAL at 08:41

## 2019-11-28 RX ADMIN — CARBIDOPA AND LEVODOPA SCH TAB: 25; 100 TABLET ORAL at 18:00

## 2019-11-28 RX ADMIN — Medication SCH MG: at 08:42

## 2019-11-28 RX ADMIN — TRAZODONE HYDROCHLORIDE SCH MG: 100 TABLET ORAL at 21:20

## 2019-11-28 RX ADMIN — CARBIDOPA AND LEVODOPA SCH TAB: 25; 100 TABLET ORAL at 13:23

## 2019-11-28 NOTE — RAD
CT HEAD WO CONTRAST

 

History: Altered mental status

 

Comparison: None.

 

Technique: Noncontrast CT imaging was performed of the head.  Exposure: 

One or more of the following individualized dose reduction techniques were

utilized for this examination:  1. Automated exposure control  2. 

Adjustment of the mA and/or kV according to patient size  3. Use of 

iterative reconstruction technique.

 

Findings: No acute extra-axial or parenchymal hemorrhage is identified. 

There is no significant intra-axial mass effect, midline shift, or 

extra-axial fluid collection. The gray-white differentiation of the major 

vascular territories is preserved.  There is patchy very mild ill-defined 

low-density of the supratentorial parenchyma bilaterally. The ventricles, 

sulci, and cisterns are within normal limits in size and configuration.   

The mastoid air cells and the visualized paranasal sinuses are aerated.  

No acute calvarial abnormality is identified.

 

Impression:

1. There is no evidence of acute intracranial hemorrhage. If there is 

suspicion for evolving or acute ischemia, follow-up CT or MRI could be 

beneficial. There is very mild ill-defined low-density of the 

supratentorial parenchyma, nonspecific findings which may be due to 

chronic microvascular ischemic disease in a patient this age.

 

Electronically signed by: Patrick Balderas MD (11/28/2019 9:26 AM) [facility]

## 2019-11-28 NOTE — PDOC
Exam


Note:


Aren Note:


Please also refer to the separate dictated note~for this date of service 

dictated separately.~Patient seen individually. Discussed the patient with 

Nursing staff reviewed the chart.~Reviewed interim history and current 

functioning. Reviewed vital signs,~Labs/ Radiology~and current medications noted

below. Continue current treatment with the changes noted in the dictated 

addendum note





Assessment:


Vital Signs/I&O:





                                   Vital Signs








  Date Time  Temp Pulse Resp B/P (MAP) Pulse Ox O2 Delivery O2 Flow Rate FiO2


 


11/28/19 17:00  73  103/65    


 


11/28/19 16:17 97.9  16  96   


 


11/26/19 16:13      Room Air  














                                    I & O   


 


 11/27/19 11/27/19 11/28/19





 15:00 23:00 07:00


 


Intake Total 960 ml 600 ml 


 


Balance 960 ml 600 ml 








Labs:





                                Laboratory Tests








Test


 11/28/19


10:35


 


White Blood Count


 4.2 x10^3/uL


(4.0-11.0)


 


Red Blood Count


 4.43 x10^6/uL


(4.30-5.70)


 


Hemoglobin


 13.2 g/dL


(13.0-17.5)


 


Hematocrit


 39.4 %


(39.0-53.0)


 


Mean Corpuscular Volume


 89 fL ()





 


Mean Corpuscular Hemoglobin 30 pg (25-35)  


 


Mean Corpuscular Hemoglobin


Concent 34 g/dL


(31-37)


 


Red Cell Distribution Width


 13.5 %


(11.5-14.5)


 


Platelet Count


 212 x10^3/uL


(140-400)


 


Neutrophils (%) (Auto) 67 % (31-73)  


 


Lymphocytes (%) (Auto) 13 % (24-48)  L


 


Monocytes (%) (Auto) 11 % (0-9)  H


 


Eosinophils (%) (Auto) 8 % (0-3)  H


 


Basophils (%) (Auto) 1 % (0-3)  


 


Neutrophils # (Auto)


 2.9 x10^3uL


(1.8-7.7)


 


Lymphocytes # (Auto)


 0.6 x10^3/uL


(1.0-4.8)  L


 


Monocytes # (Auto)


 0.5 x10^3/uL


(0.0-1.1)


 


Eosinophils # (Auto)


 0.4 x10^3/uL


(0.0-0.7)


 


Basophils # (Auto)


 0.0 x10^3/uL


(0.0-0.2)


 


Sodium Level


 142 mmol/L


(136-145)


 


Potassium Level


 4.4 mmol/L


(3.5-5.1)


 


Chloride Level


 108 mmol/L


()  H


 


Carbon Dioxide Level


 27 mmol/L


(21-32)


 


Anion Gap 7 (6-14)  


 


Blood Urea Nitrogen


 21 mg/dL


(8-26)


 


Creatinine


 0.9 mg/dL


(0.7-1.3)


 


Estimated GFR


(Cockcroft-Gault) 84.2  





 


BUN/Creatinine Ratio 23 (6-20)  H


 


Glucose Level


 95 mg/dL


(70-99)


 


Calcium Level


 8.8 mg/dL


(8.5-10.1)


 


Total Bilirubin


 0.4 mg/dL


(0.2-1.0)


 


Aspartate Amino Transferase


(AST) 14 U/L (15-37)


L


 


Alanine Aminotransferase (ALT)


 < 6 U/L


(16-63)  L


 


Alkaline Phosphatase


 84 U/L


()


 


Total Protein


 6.5 g/dL


(6.4-8.2)


 


Albumin


 3.4 g/dL


(3.4-5.0)


 


Albumin/Globulin Ratio 1.1 (1.0-1.7)  











Current Medications:


Meds:





Current Medications








 Medications


  (Trade)  Dose


 Ordered  Sig/Elijah


 Route


 PRN Reason  Start Time


 Stop Time Status Last Admin


Dose Admin


 


 Haloperidol


 Lactate


  (Haldol)  5 mg  DAILY


 IM


   11/28/19 09:00


    11/28/19 11:56





 


 Mirtazapine


  (Remeron Sol-Tab)  7.5 mg  QHS


 PO


   11/27/19 21:00


    11/27/19 19:59





 


 Vitamin D


  (Vitamin D3)  50,000 unit  WEEKLY


 PO


   11/28/19 10:00


    11/28/19 10:08











I have reviewed the current psychotropics carefully including drug interactions.

 Risk benefit ratio favors no change other than as noted in my dictated progress

note.





Diagnosis:


Problems:  


(1) Anxiety disorder


(2) Impulse control disorder


(3) Lewy body dementia with behavioral disturbance


(4) Major neurocognitive disorder, due to vascular disease, with behavioral 

disturbance, mild











MIRIAM JACOBS MD                 Nov 28, 2019 20:42

## 2019-11-28 NOTE — PN
DATE:  11/28/2019



PSYCHIATRIC PROGRESS NOTE



This late entry, 11/27/2019, covers elements not covered in my initial note.



SUBJECTIVE:  I met with the patient in the evening.  Per Tomás RN, the patient

slept for 3/4 hours previous night.  Staff had paged me as an emergency earlier

in the day as the patient was getting extremely agitated again banging on doors,

disruptive, psychotic.  We had initially stopped the scheduled IM Haldol.  We

reinitiated 5 mg at bedtime and asked for a CT head as well.  Also, returned a

call from the patient's son, Jerry as requested by [facility] social service staff

and had a lengthy discussion with the son about the patient's diagnosis, current

treatment.  Son states every time he goes to a facility.  The patient has

worsening symptoms before he stabilizes.



REVIEW OF SYSTEMS:  No CV, , pulmonary, eye system symptoms on review.



MENTAL STATUS EXAM:  Oriented to himself.  Insight, judgment, recent and remote

memory, attention, concentration, fund of knowledge poor, consistent with his

diagnosis.  His daughter was visiting him in the evening.



LABORATORY DATA:  Reviewed.



IMPRESSION:  Major neurocognitive disorder, Lewy body with delusion, depression,

behavioral disturbance; anxiety disorder, unspecified; impulse control disorder,

unspecified.



PLAN:  Start Remeron 7.5 mg at bedtime.  Rest unchanged.  Check CT head. 

Restart Haldol IM.





______________________________

MAN BRANDIN JACOBS MD



DR:  ISABELA/georgina  JOB#:  [id number] / [id number]

DD:  11/28/2019 10:59  DT:  11/28/2019 11:17

## 2019-11-28 NOTE — PN
DATE:  11/28/2019



PSYCHIATRIC PROGRESS NOTE



This note covers the elements not covered in my initial note, 11/28.



SUBJECTIVE:  I met with the patient in the morning.  The patient slept 7-1/2

hours previous night.  Overall, he seems to be doing better, ambulating better,

less aggressive, not in the west hallway anymore and not banging on doors or

breaking locks.  Less paranoid.  As I met with him individually, he talked about

his daughter visiting him yesterday, told me that he had 1 son and 1 daughter

and otherwise seemed cognitively better.  Again, part of this consistent with

his Lewy body dementia with significant variation within the same day.



REVIEW OF SYSTEMS:  Ambulation somewhat impaired due to Parkinson's.  No CV, ,

pulmonary, eye systems symptoms on review.



MENTAL STATUS EXAM:  Oriented to himself and situation.  Speech has some

latency, coherent.  Abstraction fair, computation impaired, language function

intact.  Mood and affect somewhat withdrawn.



LABORATORY DATA:  Reviewed.



IMPRESSION:  Unchanged from initial note.



PLAN:  No change from initial note.





______________________________

MIRIAM JACOBS MD



DR:  ISABELA/georgina  JOB#:  [id number] / [id number]

DD:  11/28/2019 11:00  DT:  11/28/2019 11:19

## 2019-11-29 VITALS — DIASTOLIC BLOOD PRESSURE: 85 MMHG | SYSTOLIC BLOOD PRESSURE: 170 MMHG

## 2019-11-29 VITALS — DIASTOLIC BLOOD PRESSURE: 64 MMHG | SYSTOLIC BLOOD PRESSURE: 100 MMHG

## 2019-11-29 RX ADMIN — HALOPERIDOL LACTATE SCH MG: 5 INJECTION, SOLUTION INTRAMUSCULAR at 14:14

## 2019-11-29 RX ADMIN — CARBIDOPA AND LEVODOPA SCH TAB: 25; 100 TABLET ORAL at 08:09

## 2019-11-29 RX ADMIN — CARBIDOPA AND LEVODOPA SCH TAB: 25; 100 TABLET ORAL at 10:46

## 2019-11-29 RX ADMIN — MIRTAZAPINE SCH MG: 15 TABLET, ORALLY DISINTEGRATING ORAL at 20:54

## 2019-11-29 RX ADMIN — Medication SCH MG: at 08:11

## 2019-11-29 RX ADMIN — Medication SCH MCG: at 08:09

## 2019-11-29 RX ADMIN — CARBIDOPA AND LEVODOPA SCH TAB.SA: 50; 200 TABLET, EXTENDED RELEASE ORAL at 08:10

## 2019-11-29 RX ADMIN — CITALOPRAM HYDROBROMIDE SCH MG: 20 TABLET ORAL at 08:09

## 2019-11-29 RX ADMIN — VALPROIC ACID SCH MG: 250 SOLUTION ORAL at 20:52

## 2019-11-29 RX ADMIN — ENTACAPONE SCH MG: 200 TABLET, FILM COATED ORAL at 10:46

## 2019-11-29 RX ADMIN — DONEPEZIL HYDROCHLORIDE SCH MG: 5 TABLET, FILM COATED ORAL at 08:09

## 2019-11-29 RX ADMIN — POLYETHYLENE GLYCOL 3350 SCH GM: 17 POWDER, FOR SOLUTION ORAL at 08:08

## 2019-11-29 RX ADMIN — ENTACAPONE SCH MG: 200 TABLET, FILM COATED ORAL at 20:56

## 2019-11-29 RX ADMIN — CARBIDOPA AND LEVODOPA SCH TAB.SA: 25; 100 TABLET, EXTENDED RELEASE ORAL at 20:52

## 2019-11-29 RX ADMIN — CARBIDOPA AND LEVODOPA SCH TAB: 25; 100 TABLET ORAL at 17:00

## 2019-11-29 RX ADMIN — FINASTERIDE SCH MG: 5 TABLET, FILM COATED ORAL at 08:10

## 2019-11-29 RX ADMIN — CARBIDOPA AND LEVODOPA SCH TAB: 25; 100 TABLET ORAL at 20:52

## 2019-11-29 RX ADMIN — POLYETHYLENE GLYCOL 3350 SCH GM: 17 POWDER, FOR SOLUTION ORAL at 20:52

## 2019-11-29 RX ADMIN — MELOXICAM SCH MG: 7.5 TABLET ORAL at 08:10

## 2019-11-29 RX ADMIN — MELATONIN TAB 3 MG SCH MG: 3 TAB at 20:54

## 2019-11-29 RX ADMIN — QUETIAPINE FUMARATE SCH MG: 25 TABLET, FILM COATED ORAL at 08:10

## 2019-11-29 RX ADMIN — ACETAMINOPHEN PRN MG: 325 TABLET, FILM COATED ORAL at 03:24

## 2019-11-29 RX ADMIN — MEMANTINE HYDROCHLORIDE SCH MG: 5 TABLET ORAL at 20:53

## 2019-11-29 RX ADMIN — CARBIDOPA AND LEVODOPA SCH TAB: 25; 100 TABLET ORAL at 14:12

## 2019-11-29 RX ADMIN — QUETIAPINE FUMARATE SCH MG: 100 TABLET, FILM COATED ORAL at 20:55

## 2019-11-29 RX ADMIN — TRAZODONE HYDROCHLORIDE SCH MG: 100 TABLET ORAL at 20:54

## 2019-11-29 RX ADMIN — QUETIAPINE FUMARATE SCH MG: 25 TABLET, FILM COATED ORAL at 14:13

## 2019-11-29 NOTE — PDOC
Exam


Note:


Aren Note:


Please also refer to the separate dictated note~for this date of service 

dictated separately.~Patient seen individually. Discussed the patient with 

Nursing staff reviewed the chart.~Reviewed interim history and current 

functioning. Reviewed vital signs,~Labs/ Radiology~and current medications noted

below. Continue current treatment with the changes noted in the dictated 

addendum note





Assessment:


Vital Signs/I&O:





                                   Vital Signs








  Date Time  Temp Pulse Resp B/P (MAP) Pulse Ox O2 Delivery O2 Flow Rate FiO2


 


11/29/19 17:23 98.0 95 20 170/91 (117) 98   


 


11/26/19 16:13      Room Air  














                                    I & O   


 


 11/28/19 11/28/19 11/29/19





 15:00 23:00 07:00


 


Intake Total 480 ml 120 ml 


 


Balance 480 ml 120 ml 











Current Medications:


I have reviewed the current psychotropics carefully including drug interactions.

 Risk benefit ratio favors no change other than as noted in my dictated progress

note.





Diagnosis:


Problems:  


(1) Anxiety disorder


(2) Impulse control disorder


(3) Lewy body dementia with behavioral disturbance


(4) Major neurocognitive disorder, due to vascular disease, with behavioral 

disturbance, mild











MIRIAM JACOBS MD                 Nov 29, 2019 20:09

## 2019-11-30 VITALS — SYSTOLIC BLOOD PRESSURE: 154 MMHG | DIASTOLIC BLOOD PRESSURE: 81 MMHG

## 2019-11-30 VITALS — SYSTOLIC BLOOD PRESSURE: 172 MMHG | DIASTOLIC BLOOD PRESSURE: 98 MMHG

## 2019-11-30 VITALS — SYSTOLIC BLOOD PRESSURE: 175 MMHG | DIASTOLIC BLOOD PRESSURE: 110 MMHG

## 2019-11-30 VITALS — DIASTOLIC BLOOD PRESSURE: 92 MMHG | SYSTOLIC BLOOD PRESSURE: 179 MMHG

## 2019-11-30 RX ADMIN — QUETIAPINE FUMARATE SCH MG: 25 TABLET, FILM COATED ORAL at 12:45

## 2019-11-30 RX ADMIN — CARBIDOPA AND LEVODOPA SCH TAB.SA: 50; 200 TABLET, EXTENDED RELEASE ORAL at 08:47

## 2019-11-30 RX ADMIN — POLYETHYLENE GLYCOL 3350 SCH GM: 17 POWDER, FOR SOLUTION ORAL at 19:45

## 2019-11-30 RX ADMIN — DONEPEZIL HYDROCHLORIDE SCH MG: 5 TABLET, FILM COATED ORAL at 08:46

## 2019-11-30 RX ADMIN — QUETIAPINE FUMARATE SCH MG: 100 TABLET, FILM COATED ORAL at 19:42

## 2019-11-30 RX ADMIN — QUETIAPINE FUMARATE SCH MG: 25 TABLET, FILM COATED ORAL at 08:47

## 2019-11-30 RX ADMIN — TRAZODONE HYDROCHLORIDE PRN MG: 100 TABLET ORAL at 01:19

## 2019-11-30 RX ADMIN — MELOXICAM SCH MG: 7.5 TABLET ORAL at 08:46

## 2019-11-30 RX ADMIN — Medication SCH MCG: at 09:00

## 2019-11-30 RX ADMIN — VALPROIC ACID SCH MG: 250 SOLUTION ORAL at 19:45

## 2019-11-30 RX ADMIN — CARBIDOPA AND LEVODOPA SCH TAB: 25; 100 TABLET ORAL at 17:28

## 2019-11-30 RX ADMIN — CARBIDOPA AND LEVODOPA SCH TAB: 25; 100 TABLET ORAL at 05:30

## 2019-11-30 RX ADMIN — TRAZODONE HYDROCHLORIDE SCH MG: 100 TABLET ORAL at 19:42

## 2019-11-30 RX ADMIN — Medication SCH MG: at 08:45

## 2019-11-30 RX ADMIN — CARBIDOPA AND LEVODOPA SCH TAB.SA: 25; 100 TABLET, EXTENDED RELEASE ORAL at 19:43

## 2019-11-30 RX ADMIN — CARBIDOPA AND LEVODOPA SCH TAB: 25; 100 TABLET ORAL at 10:59

## 2019-11-30 RX ADMIN — ENTACAPONE SCH MG: 200 TABLET, FILM COATED ORAL at 08:46

## 2019-11-30 RX ADMIN — CARBIDOPA AND LEVODOPA SCH TAB: 25; 100 TABLET ORAL at 12:45

## 2019-11-30 RX ADMIN — MIRTAZAPINE SCH MG: 15 TABLET, ORALLY DISINTEGRATING ORAL at 19:44

## 2019-11-30 RX ADMIN — ENTACAPONE SCH MG: 200 TABLET, FILM COATED ORAL at 19:43

## 2019-11-30 RX ADMIN — RIVASTIGMINE SCH PATCH: 4.6 PATCH, EXTENDED RELEASE TRANSDERMAL at 08:47

## 2019-11-30 RX ADMIN — CARBIDOPA AND LEVODOPA SCH TAB: 25; 100 TABLET ORAL at 19:43

## 2019-11-30 RX ADMIN — POLYETHYLENE GLYCOL 3350 SCH GM: 17 POWDER, FOR SOLUTION ORAL at 08:46

## 2019-11-30 RX ADMIN — CITALOPRAM HYDROBROMIDE SCH MG: 20 TABLET ORAL at 08:46

## 2019-11-30 RX ADMIN — HALOPERIDOL LACTATE SCH MG: 5 INJECTION, SOLUTION INTRAMUSCULAR at 09:00

## 2019-11-30 RX ADMIN — MEMANTINE HYDROCHLORIDE SCH MG: 5 TABLET ORAL at 08:46

## 2019-11-30 RX ADMIN — FINASTERIDE SCH MG: 5 TABLET, FILM COATED ORAL at 08:46

## 2019-11-30 RX ADMIN — MELATONIN TAB 3 MG SCH MG: 3 TAB at 19:44

## 2019-11-30 RX ADMIN — ACETAMINOPHEN PRN MG: 325 TABLET, FILM COATED ORAL at 01:19

## 2019-11-30 RX ADMIN — MEMANTINE HYDROCHLORIDE SCH MG: 5 TABLET ORAL at 19:44

## 2019-11-30 NOTE — PDOC
Exam


Note:


Aren Note:


Please also refer to the separate dictated note~for this date of service 

dictated separately.~Patient seen individually. Discussed the patient with 

Nursing staff reviewed the chart.~Reviewed interim history and current 

functioning. Reviewed vital signs,~Labs/ Radiology~and current medications noted

below. Continue current treatment with the changes noted in the dictated 

addendum note





Assessment:


Vital Signs/I&O:





                                   Vital Signs








  Date Time  Temp Pulse Resp B/P (MAP) Pulse Ox O2 Delivery O2 Flow Rate FiO2


 


11/30/19 15:54 98.4 85 20 172/98 (122) 97   


 


11/30/19 06:17      Room Air  














                                    I & O   


 


 11/29/19 11/29/19 11/30/19





 15:00 23:00 07:00


 


Intake Total 840 ml 600 ml 240 ml


 


Balance 840 ml 600 ml 240 ml











Current Medications:


Meds:





Current Medications








 Medications


  (Trade)  Dose


 Ordered  Sig/Elijah


 Route


 PRN Reason  Start Time


 Stop Time Status Last Admin


Dose Admin


 


 Rivastigmine


  (Exelon)  1 patch  DAILY


 TD


   11/30/19 09:00


 12/1/19 23:50  11/30/19 08:47





 


 Diltiazem HCl


  (Cardizem 24hr


 Cd)  180 mg  DAILY


 PO


   11/30/19 12:00


    11/30/19 12:45











I have reviewed the current psychotropics carefully including drug interactions.

 Risk benefit ratio favors no change other than as noted in my dictated progress

note.





Diagnosis:


Problems:  


(1) Anxiety disorder


(2) Impulse control disorder


(3) Lewy body dementia with behavioral disturbance


(4) Major neurocognitive disorder, due to vascular disease, with behavioral 

disturbance, mild











MIRIAM JACOBS MD                 Nov 30, 2019 21:58

## 2019-12-01 VITALS — SYSTOLIC BLOOD PRESSURE: 177 MMHG | DIASTOLIC BLOOD PRESSURE: 98 MMHG

## 2019-12-01 VITALS — SYSTOLIC BLOOD PRESSURE: 152 MMHG | DIASTOLIC BLOOD PRESSURE: 85 MMHG

## 2019-12-01 VITALS — SYSTOLIC BLOOD PRESSURE: 166 MMHG | DIASTOLIC BLOOD PRESSURE: 100 MMHG

## 2019-12-01 RX ADMIN — CARBIDOPA AND LEVODOPA SCH TAB: 25; 100 TABLET ORAL at 12:22

## 2019-12-01 RX ADMIN — Medication SCH MCG: at 12:21

## 2019-12-01 RX ADMIN — QUETIAPINE FUMARATE SCH MG: 25 TABLET, FILM COATED ORAL at 12:23

## 2019-12-01 RX ADMIN — CITALOPRAM HYDROBROMIDE SCH MG: 20 TABLET ORAL at 12:20

## 2019-12-01 RX ADMIN — CARBIDOPA AND LEVODOPA SCH TAB: 25; 100 TABLET ORAL at 20:41

## 2019-12-01 RX ADMIN — DONEPEZIL HYDROCHLORIDE SCH MG: 5 TABLET, FILM COATED ORAL at 12:20

## 2019-12-01 RX ADMIN — CARBIDOPA AND LEVODOPA SCH TAB: 25; 100 TABLET ORAL at 12:23

## 2019-12-01 RX ADMIN — RIVASTIGMINE SCH PATCH: 4.6 PATCH, EXTENDED RELEASE TRANSDERMAL at 12:21

## 2019-12-01 RX ADMIN — CARBIDOPA AND LEVODOPA SCH TAB.SA: 25; 100 TABLET, EXTENDED RELEASE ORAL at 20:40

## 2019-12-01 RX ADMIN — ENTACAPONE SCH MG: 200 TABLET, FILM COATED ORAL at 20:41

## 2019-12-01 RX ADMIN — QUETIAPINE FUMARATE SCH MG: 25 TABLET, FILM COATED ORAL at 12:21

## 2019-12-01 RX ADMIN — MEMANTINE HYDROCHLORIDE SCH MG: 5 TABLET ORAL at 12:21

## 2019-12-01 RX ADMIN — Medication SCH MG: at 12:20

## 2019-12-01 RX ADMIN — QUETIAPINE FUMARATE SCH MG: 100 TABLET, FILM COATED ORAL at 20:40

## 2019-12-01 RX ADMIN — ENTACAPONE SCH MG: 200 TABLET, FILM COATED ORAL at 12:20

## 2019-12-01 RX ADMIN — CARBIDOPA AND LEVODOPA SCH TAB.SA: 50; 200 TABLET, EXTENDED RELEASE ORAL at 12:21

## 2019-12-01 RX ADMIN — MIRTAZAPINE SCH MG: 15 TABLET, ORALLY DISINTEGRATING ORAL at 20:40

## 2019-12-01 RX ADMIN — MELATONIN TAB 3 MG SCH MG: 3 TAB at 20:41

## 2019-12-01 RX ADMIN — FINASTERIDE SCH MG: 5 TABLET, FILM COATED ORAL at 12:21

## 2019-12-01 RX ADMIN — TRAZODONE HYDROCHLORIDE SCH MG: 100 TABLET ORAL at 20:40

## 2019-12-01 RX ADMIN — POLYETHYLENE GLYCOL 3350 SCH GM: 17 POWDER, FOR SOLUTION ORAL at 12:20

## 2019-12-01 RX ADMIN — HALOPERIDOL LACTATE SCH MG: 5 INJECTION, SOLUTION INTRAMUSCULAR at 09:25

## 2019-12-01 RX ADMIN — MELOXICAM SCH MG: 7.5 TABLET ORAL at 12:20

## 2019-12-01 RX ADMIN — TRAZODONE HYDROCHLORIDE PRN MG: 100 TABLET ORAL at 00:48

## 2019-12-01 RX ADMIN — CARBIDOPA AND LEVODOPA SCH TAB: 25; 100 TABLET ORAL at 16:12

## 2019-12-01 RX ADMIN — MEMANTINE HYDROCHLORIDE SCH MG: 10 TABLET ORAL at 20:40

## 2019-12-01 RX ADMIN — METOPROLOL SUCCINATE SCH MG: 50 TABLET, EXTENDED RELEASE ORAL at 16:13

## 2019-12-01 RX ADMIN — POLYETHYLENE GLYCOL 3350 SCH GM: 17 POWDER, FOR SOLUTION ORAL at 16:12

## 2019-12-01 RX ADMIN — CARBIDOPA AND LEVODOPA SCH TAB: 25; 100 TABLET ORAL at 05:44

## 2019-12-01 RX ADMIN — VALPROIC ACID SCH MG: 250 SOLUTION ORAL at 20:39

## 2019-12-01 NOTE — PN
DATE:  11/29/2019



PSYCHIATRIC PROGRESS NOTE



This late entry 11/29/2019 covers elements not covered in my initial note.



SUBJECTIVE:  I met with the patient individually.  The patient did well in the

morning and then was quite agitated around lunchtime.  He sat in groups for a

while after lunch and since then has been hallucinating, somewhat delusional,

looking for his brother.  Valproic acid level is 27 the day before



REVIEW OF SYSTEMS:  No CV, , pulmonary, eye, ENT system symptoms on review. 

Reliability varies.



MENTAL STATUS EXAM:  Oriented to himself and situation.  Speech coherent, rapid

at times.  Abstraction fair, computation impaired, language function intact,

attention span short.  Mood and affect remains anxious, labile.  At times, he

appears cognitively much clearer than other times and again this is consistent

with his diagnosis of Lewy body dementia.



LABORATORY DATA:  Reviewed.



IMPRESSION:  Major neurocognitive disorder, Lewy body with delusion, depression,

behavioral disturbance; anxiety disorder, unspecified; impulse control disorder,

unspecified.  Rest unchanged from initial note.



PLAN:  Given his diagnosis of Lewy body dementia, we will start him on Exelon

patch 4.6 mg a day for 3 days, then 9.5 mg a day, Namenda 5 mg b.i.d. for 3

days, then 10 mg b.i.d.  May next increased the Depakote if agitation, mood

lability persists.  Continue rest, unchanged for now.





______________________________

MAN BRANDIN JACOBS MD



DR:  ISABELA/georgina  JOB#:  [id number] / [id number]

DD:  12/01/2019 20:20  DT:  12/01/2019 22:21

## 2019-12-01 NOTE — PN
DATE:  11/30/2019



PSYCHIATRIC PROGRESS NOTE



This late entry 11/30 covers elements not covered in my initial note.



SUBJECTIVE:  The patient slept 5-3/4 hours previous night.  At times during the

day, he seems quite coherent as opposed to other times when he is quite

confused, psychotic, agitated, running up and down the hallway at times, banging

on the nursing station windows.



REVIEW OF SYSTEMS:  No CV, , pulmonary, eye, ENT system symptoms on review.



MENTAL STATUS EXAM:  Oriented to himself.  Insight, judgment, recent and remote

memory, attention, concentration, fund of knowledge poor, consistent with his

diagnosis mentioned in my initial note.



PLAN:  No change from initial note.





______________________________

MAN BRANDIN JACOBS MD



DR:  ISABELA/georgina  JOB#:  [id number] / [id number]

DD:  12/01/2019 21:12  DT:  12/01/2019 22:31

## 2019-12-01 NOTE — PDOC
Exam


Note:


Aren Note:


Please also refer to the separate dictated note~for this date of service 

dictated separately.~Patient seen individually. Discussed the patient with 

Nursing staff reviewed the chart.~Reviewed interim history and current 

functioning. Reviewed vital signs,~Labs/ Radiology~and current medications noted

below. Continue current treatment with the changes noted in the dictated 

addendum note





Assessment:


Vital Signs/I&O:





                                   Vital Signs








  Date Time  Temp Pulse Resp B/P (MAP) Pulse Ox O2 Delivery O2 Flow Rate FiO2


 


12/1/19 19:41 98.1 77 16 152/85 (107) 97   


 


11/30/19 06:17      Room Air  














                                    I & O   


 


 11/30/19 11/30/19 12/1/19





 15:00 23:00 07:00


 


Intake Total 1080 ml 900 ml 


 


Balance 1080 ml 900 ml 











Current Medications:


Meds:





Current Medications








 Medications


  (Trade)  Dose


 Ordered  Sig/Elijah


 Route


 PRN Reason  Start Time


 Stop Time Status Last Admin


Dose Admin


 


 Memantine


  (Namenda)  10 mg  BID


 PO


   12/1/19 21:00


    12/1/19 20:40





 


 Valproic Acid


  (Depakene)  500 mg  BID


 PO


   12/1/19 21:00


    12/1/19 20:39





 


 Metoprolol


 Succinate


  (Toprol Xl)  50 mg  DAILY


 PO


   12/1/19 15:15


    12/1/19 16:13





 


 Carbidopa/Levodopa


  (Sinemet Cr)  1 tab.sa  HS


 PO


   12/1/19 21:00


    12/1/19 20:40











I have reviewed the current psychotropics carefully including drug interactions.

 Risk benefit ratio favors no change other than as noted in my dictated progress

note.





Diagnosis:


Problems:  


(1) Anxiety disorder


(2) Impulse control disorder


(3) Lewy body dementia with behavioral disturbance


(4) Major neurocognitive disorder, due to vascular disease, with behavioral 

disturbance, mild











MIRIAM JACOBS MD                  Dec 1, 2019 20:46

## 2019-12-02 VITALS — DIASTOLIC BLOOD PRESSURE: 65 MMHG | SYSTOLIC BLOOD PRESSURE: 100 MMHG

## 2019-12-02 VITALS — DIASTOLIC BLOOD PRESSURE: 80 MMHG | SYSTOLIC BLOOD PRESSURE: 159 MMHG

## 2019-12-02 RX ADMIN — FINASTERIDE SCH MG: 5 TABLET, FILM COATED ORAL at 09:22

## 2019-12-02 RX ADMIN — CARBIDOPA AND LEVODOPA SCH TAB: 25; 100 TABLET ORAL at 17:19

## 2019-12-02 RX ADMIN — QUETIAPINE FUMARATE SCH MG: 25 TABLET, FILM COATED ORAL at 14:41

## 2019-12-02 RX ADMIN — MEMANTINE HYDROCHLORIDE SCH MG: 10 TABLET ORAL at 19:47

## 2019-12-02 RX ADMIN — MIRTAZAPINE SCH MG: 15 TABLET, ORALLY DISINTEGRATING ORAL at 19:46

## 2019-12-02 RX ADMIN — POLYETHYLENE GLYCOL 3350 SCH GM: 17 POWDER, FOR SOLUTION ORAL at 19:46

## 2019-12-02 RX ADMIN — ENTACAPONE SCH MG: 200 TABLET, FILM COATED ORAL at 09:22

## 2019-12-02 RX ADMIN — ENTACAPONE SCH MG: 200 TABLET, FILM COATED ORAL at 19:46

## 2019-12-02 RX ADMIN — CITALOPRAM HYDROBROMIDE SCH MG: 20 TABLET ORAL at 09:23

## 2019-12-02 RX ADMIN — CARBIDOPA AND LEVODOPA SCH TAB: 25; 100 TABLET ORAL at 14:41

## 2019-12-02 RX ADMIN — Medication SCH MG: at 09:24

## 2019-12-02 RX ADMIN — QUETIAPINE FUMARATE SCH MG: 100 TABLET, FILM COATED ORAL at 19:46

## 2019-12-02 RX ADMIN — Medication SCH MCG: at 09:22

## 2019-12-02 RX ADMIN — RIVASTIGMINE SCH PATCH: 9.5 PATCH, EXTENDED RELEASE TRANSDERMAL at 09:28

## 2019-12-02 RX ADMIN — QUETIAPINE FUMARATE SCH MG: 25 TABLET, FILM COATED ORAL at 09:23

## 2019-12-02 RX ADMIN — CARBIDOPA AND LEVODOPA SCH TAB: 25; 100 TABLET ORAL at 06:05

## 2019-12-02 RX ADMIN — TRAZODONE HYDROCHLORIDE PRN MG: 100 TABLET ORAL at 01:57

## 2019-12-02 RX ADMIN — MELOXICAM SCH MG: 7.5 TABLET ORAL at 09:23

## 2019-12-02 RX ADMIN — DONEPEZIL HYDROCHLORIDE SCH MG: 5 TABLET, FILM COATED ORAL at 09:23

## 2019-12-02 RX ADMIN — POLYETHYLENE GLYCOL 3350 SCH GM: 17 POWDER, FOR SOLUTION ORAL at 09:21

## 2019-12-02 RX ADMIN — MELATONIN TAB 3 MG SCH MG: 3 TAB at 19:45

## 2019-12-02 RX ADMIN — CARBIDOPA AND LEVODOPA SCH TAB: 25; 100 TABLET ORAL at 09:22

## 2019-12-02 RX ADMIN — MEMANTINE HYDROCHLORIDE SCH MG: 10 TABLET ORAL at 09:23

## 2019-12-02 RX ADMIN — TRAZODONE HYDROCHLORIDE SCH MG: 100 TABLET ORAL at 19:46

## 2019-12-02 RX ADMIN — HALOPERIDOL LACTATE SCH MG: 5 INJECTION, SOLUTION INTRAMUSCULAR at 09:00

## 2019-12-02 RX ADMIN — CARBIDOPA AND LEVODOPA SCH TAB.SA: 25; 100 TABLET, EXTENDED RELEASE ORAL at 19:46

## 2019-12-02 RX ADMIN — METOPROLOL SUCCINATE SCH MG: 50 TABLET, EXTENDED RELEASE ORAL at 09:22

## 2019-12-02 RX ADMIN — VALPROIC ACID SCH MG: 250 SOLUTION ORAL at 09:21

## 2019-12-02 RX ADMIN — CARBIDOPA AND LEVODOPA SCH TAB: 25; 100 TABLET ORAL at 19:45

## 2019-12-02 NOTE — PDOC
Exam


Note:


Aren Note:


Please also refer to the separate dictated note~for this date of service 

dictated separately.~Patient seen individually. Discussed the patient with 

Nursing staff reviewed the chart.~Reviewed interim history and current 

functioning. Reviewed vital signs,~Labs/ Radiology~and current medications noted

below. Continue current treatment with the changes noted in the dictated 

addendum note





Assessment:


Vital Signs/I&O:





                                   Vital Signs








  Date Time  Temp Pulse Resp B/P (MAP) Pulse Ox O2 Delivery O2 Flow Rate FiO2


 


12/2/19 16:15 97.7 54 14 100/65 (77) 97   


 


11/30/19 06:17      Room Air  














                                    I & O   


 


 12/1/19 12/1/19 12/2/19





 15:00 23:00 07:00


 


Intake Total 600 ml 660 ml 


 


Balance 600 ml 660 ml 











Current Medications:


Meds:





Current Medications








 Medications


  (Trade)  Dose


 Ordered  Sig/Elijah


 Route


 PRN Reason  Start Time


 Stop Time Status Last Admin


Dose Admin


 


 Rivastigmine


  (Exelon)  1 patch  DAILY


 TD


   12/2/19 09:00


    12/2/19 09:28





 


 Divalproex Sodium


  (Depakote Er)  500 mg  QHS


 PO


   12/2/19 21:00


 12/3/19 20:59  12/2/19 19:45











I have reviewed the current psychotropics carefully including drug interactions.

 Risk benefit ratio favors no change other than as noted in my dictated progress

note.





Diagnosis:


Problems:  


(1) Anxiety disorder


(2) Impulse control disorder


(3) Lewy body dementia with behavioral disturbance


(4) Major neurocognitive disorder, due to vascular disease, with behavioral dis

turbance, mild











MIRIAM JACOBS MD                  Dec 2, 2019 21:00

## 2019-12-02 NOTE — PN
DATE:  12/01/2019



PSYCHIATRIC PROGRESS NOTE



This late entry 12/01/2019 covers elements not covered in my initial note.



SUBJECTIVE:  I met with the patient evening of 12/01/2019.  Previous night

around midnight, the patient according to nursing staff was making statements

that "I am on fire."  He was cursing at staff, combative.  During the

afternoon, he was delusional, exit seeking at the doors, physically hitting at

the windows, labile in his mood, more confused at certain times than others

consistent with his Lewy body dementia.



REVIEW OF SYSTEMS:  No CV, , pulmonary, eye, ENT system symptoms on review. 

Reliability poor.



MENTAL STATUS EXAM:  Oriented to himself.  Insight, judgment, recent memory is

impaired.  Language function intact.  Attention span short.  Mood and affect

remains labile.



LABORATORY DATA:  Reviewed.



IMPRESSION:  Major neurocognitive disorder, Lewy body with delusion, depression,

behavioral disturbance; anxiety disorder, unspecified; impulse control disorder,

unspecified.



PLAN:  Change the Depakene 500 mg b.i.d. to Depakote ER 1 gram p.o. at bedtime. 

Check CBC, CMP, valproic acid level in 3 days.  Maintain Exelon patch, Celexa

and the rest of his psychotropics including Namenda.  Defer management

Parkinson's to Dr. Stone.





______________________________

MAN BRANDIN JACOBS MD



DR:  ISABELA/georgina  JOB#:  [id number] / [id number]

DD:  12/02/2019 17:31  DT:  12/02/2019 22:36
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
## 2019-12-03 VITALS — DIASTOLIC BLOOD PRESSURE: 68 MMHG | SYSTOLIC BLOOD PRESSURE: 110 MMHG

## 2019-12-03 VITALS — SYSTOLIC BLOOD PRESSURE: 121 MMHG | DIASTOLIC BLOOD PRESSURE: 70 MMHG

## 2019-12-03 RX ADMIN — ENTACAPONE SCH MG: 200 TABLET, FILM COATED ORAL at 09:02

## 2019-12-03 RX ADMIN — MIRTAZAPINE SCH MG: 15 TABLET, ORALLY DISINTEGRATING ORAL at 20:09

## 2019-12-03 RX ADMIN — Medication SCH MG: at 09:03

## 2019-12-03 RX ADMIN — MEMANTINE HYDROCHLORIDE SCH MG: 10 TABLET ORAL at 09:01

## 2019-12-03 RX ADMIN — CARBIDOPA AND LEVODOPA SCH TAB: 25; 100 TABLET ORAL at 09:04

## 2019-12-03 RX ADMIN — TRAZODONE HYDROCHLORIDE SCH MG: 100 TABLET ORAL at 20:12

## 2019-12-03 RX ADMIN — POLYETHYLENE GLYCOL 3350 SCH GM: 17 POWDER, FOR SOLUTION ORAL at 20:13

## 2019-12-03 RX ADMIN — HALOPERIDOL LACTATE SCH MG: 5 INJECTION, SOLUTION INTRAMUSCULAR at 09:24

## 2019-12-03 RX ADMIN — CARBIDOPA AND LEVODOPA SCH TAB.SA: 25; 100 TABLET, EXTENDED RELEASE ORAL at 20:12

## 2019-12-03 RX ADMIN — CARBIDOPA AND LEVODOPA SCH TAB: 25; 100 TABLET ORAL at 05:43

## 2019-12-03 RX ADMIN — MELOXICAM SCH MG: 7.5 TABLET ORAL at 09:02

## 2019-12-03 RX ADMIN — ENTACAPONE SCH MG: 200 TABLET, FILM COATED ORAL at 20:18

## 2019-12-03 RX ADMIN — CARBIDOPA AND LEVODOPA SCH TAB: 25; 100 TABLET ORAL at 13:00

## 2019-12-03 RX ADMIN — METOPROLOL SUCCINATE SCH MG: 50 TABLET, EXTENDED RELEASE ORAL at 09:01

## 2019-12-03 RX ADMIN — DONEPEZIL HYDROCHLORIDE SCH MG: 5 TABLET, FILM COATED ORAL at 09:02

## 2019-12-03 RX ADMIN — POLYETHYLENE GLYCOL 3350 SCH GM: 17 POWDER, FOR SOLUTION ORAL at 09:00

## 2019-12-03 RX ADMIN — Medication SCH MCG: at 09:01

## 2019-12-03 RX ADMIN — DIVALPROEX SODIUM SCH MG: 500 TABLET, FILM COATED, EXTENDED RELEASE ORAL at 20:12

## 2019-12-03 RX ADMIN — QUETIAPINE FUMARATE SCH MG: 25 TABLET, FILM COATED ORAL at 09:02

## 2019-12-03 RX ADMIN — FINASTERIDE SCH MG: 5 TABLET, FILM COATED ORAL at 09:01

## 2019-12-03 RX ADMIN — CARBIDOPA AND LEVODOPA SCH TAB: 25; 100 TABLET ORAL at 17:37

## 2019-12-03 RX ADMIN — RIVASTIGMINE SCH PATCH: 9.5 PATCH, EXTENDED RELEASE TRANSDERMAL at 09:02

## 2019-12-03 RX ADMIN — MEMANTINE HYDROCHLORIDE SCH MG: 10 TABLET ORAL at 20:12

## 2019-12-03 RX ADMIN — MELATONIN TAB 3 MG SCH MG: 3 TAB at 20:11

## 2019-12-03 RX ADMIN — CARBIDOPA AND LEVODOPA SCH TAB: 25; 100 TABLET ORAL at 23:31

## 2019-12-03 RX ADMIN — QUETIAPINE FUMARATE SCH MG: 25 TABLET, FILM COATED ORAL at 13:01

## 2019-12-03 RX ADMIN — QUETIAPINE FUMARATE SCH MG: 100 TABLET, FILM COATED ORAL at 20:12

## 2019-12-03 RX ADMIN — CITALOPRAM HYDROBROMIDE SCH MG: 20 TABLET ORAL at 09:01

## 2019-12-03 NOTE — PN
DATE:  12/02/2019



PSYCHIATRIC PROGRESS NOTE



This late entry 12/02/2019 covers elements not covered in my initial note.



SUBJECTIVE:  I met with the patient at length in his room evening of 12/02/2019.

 Per TARIK England, the patient slept 6 hours previous night.  He has had some mood

lability on 12/01/2019, he was quite agitated, somewhat drowsy on 12/02/2019,

unsteady on his feet, disorganized, resistive to medications, having significant

tremors.  We will defer to Dr. Stone, Neurology consult and may consider

stopping the scheduled IM Haldol since this could be worsening some of the EPS.



REVIEW OF SYSTEMS:  Impaired ambulation, tremors.  No CV, , pulmonary, eye

system symptoms on review.



MENTAL STATUS EXAM:  Oriented to himself, situation at times, other times quite

confused.



REVIEW OF SYSTEMS:  No CV, , pulmonary, eye system symptoms on review.  Speech

has some latency at times, coherent, abstraction fair, computation impaired,

language function intact, attention span short.  Mood and affect withdrawn.



LABORATORY DATA:  Reviewed.



IMPRESSION:  Unchanged from initial note.



PLAN:  Change Depakote to Depakote ER 1 gram p.o. at bedtime.  Check CBC, CMP,

valproic acid level in 3 days.  Rest unchanged for now.





______________________________

MAN BRANDIN JACOBS MD



DR:  ISABELA/georgina  JOB#:  [id number] / [id number]

DD:  12/03/2019 16:30  DT:  12/03/2019 21:34

## 2019-12-03 NOTE — PDOC
Exam


Note:


Aren Note:


Please also refer to the separate dictated note~for this date of service 

dictated separately.~Patient seen individually. Discussed the patient with 

Nursing staff reviewed the chart.~Reviewed interim history and current 

functioning. Reviewed vital signs,~Labs/ Radiology~and current medications noted

below. Continue current treatment with the changes noted in the dictated 

addendum note





Assessment:


Vital Signs/I&O:





                                   Vital Signs








  Date Time  Temp Pulse Resp B/P (MAP) Pulse Ox O2 Delivery O2 Flow Rate FiO2


 


12/3/19 15:53 97.1 60 16 121/70 (87) 94   


 


11/30/19 06:17      Room Air  














                                    I & O   


 


 12/2/19 12/2/19 12/3/19





 15:00 23:00 07:00


 


Intake Total 600 ml 600 ml 


 


Balance 600 ml 600 ml 











Current Medications:


Meds:





Current Medications








 Medications


  (Trade)  Dose


 Ordered  Sig/Elijah


 Route


 PRN Reason  Start Time


 Stop Time Status Last Admin


Dose Admin


 


 Divalproex Sodium


  (Depakote Er)  500 mg  QHS


 PO


   12/2/19 21:00


 12/3/19 20:59  12/2/19 19:45





 


 Divalproex Sodium


  (Depakote Er)  1,000 mg  QHS


 PO


   12/3/19 21:00


    12/3/19 20:12











I have reviewed the current psychotropics carefully including drug interactions.

 Risk benefit ratio favors no change other than as noted in my dictated progress

note.





Diagnosis:


Problems:  


(1) Anxiety disorder


(2) Impulse control disorder


(3) Lewy body dementia with behavioral disturbance


(4) Major neurocognitive disorder, due to vascular disease, with behavioral 

disturbance, mild











MIRIAM JACOBS MD                  Dec 3, 2019 20:46

## 2019-12-04 VITALS — SYSTOLIC BLOOD PRESSURE: 108 MMHG | DIASTOLIC BLOOD PRESSURE: 63 MMHG

## 2019-12-04 VITALS — DIASTOLIC BLOOD PRESSURE: 50 MMHG | SYSTOLIC BLOOD PRESSURE: 95 MMHG

## 2019-12-04 VITALS — SYSTOLIC BLOOD PRESSURE: 127 MMHG | DIASTOLIC BLOOD PRESSURE: 77 MMHG

## 2019-12-04 RX ADMIN — MELATONIN TAB 3 MG SCH MG: 3 TAB at 20:00

## 2019-12-04 RX ADMIN — QUETIAPINE FUMARATE SCH MG: 25 TABLET, FILM COATED ORAL at 15:07

## 2019-12-04 RX ADMIN — CARBIDOPA AND LEVODOPA SCH TAB: 25; 100 TABLET ORAL at 15:07

## 2019-12-04 RX ADMIN — CARBIDOPA AND LEVODOPA SCH TAB.SA: 25; 100 TABLET, EXTENDED RELEASE ORAL at 20:00

## 2019-12-04 RX ADMIN — CARBIDOPA AND LEVODOPA SCH TAB: 25; 100 TABLET ORAL at 06:09

## 2019-12-04 RX ADMIN — POLYETHYLENE GLYCOL 3350 SCH GM: 17 POWDER, FOR SOLUTION ORAL at 08:24

## 2019-12-04 RX ADMIN — CARBIDOPA AND LEVODOPA SCH TAB: 25; 100 TABLET ORAL at 11:39

## 2019-12-04 RX ADMIN — ENTACAPONE SCH MG: 200 TABLET, FILM COATED ORAL at 20:01

## 2019-12-04 RX ADMIN — CITALOPRAM HYDROBROMIDE SCH MG: 20 TABLET ORAL at 08:24

## 2019-12-04 RX ADMIN — METOPROLOL SUCCINATE SCH MG: 50 TABLET, EXTENDED RELEASE ORAL at 08:23

## 2019-12-04 RX ADMIN — MEMANTINE HYDROCHLORIDE SCH MG: 10 TABLET ORAL at 08:24

## 2019-12-04 RX ADMIN — DIVALPROEX SODIUM SCH MG: 500 TABLET, FILM COATED, EXTENDED RELEASE ORAL at 20:00

## 2019-12-04 RX ADMIN — QUETIAPINE FUMARATE SCH MG: 25 TABLET, FILM COATED ORAL at 08:23

## 2019-12-04 RX ADMIN — POLYETHYLENE GLYCOL 3350 SCH GM: 17 POWDER, FOR SOLUTION ORAL at 20:01

## 2019-12-04 RX ADMIN — Medication SCH MCG: at 08:24

## 2019-12-04 RX ADMIN — QUETIAPINE FUMARATE SCH MG: 100 TABLET, FILM COATED ORAL at 20:00

## 2019-12-04 RX ADMIN — FINASTERIDE SCH MG: 5 TABLET, FILM COATED ORAL at 08:22

## 2019-12-04 RX ADMIN — MELOXICAM SCH MG: 7.5 TABLET ORAL at 08:23

## 2019-12-04 RX ADMIN — RIVASTIGMINE SCH PATCH: 9.5 PATCH, EXTENDED RELEASE TRANSDERMAL at 08:24

## 2019-12-04 RX ADMIN — MEMANTINE HYDROCHLORIDE SCH MG: 10 TABLET ORAL at 20:00

## 2019-12-04 RX ADMIN — ENTACAPONE SCH MG: 200 TABLET, FILM COATED ORAL at 08:24

## 2019-12-04 RX ADMIN — Medication SCH MG: at 08:25

## 2019-12-04 RX ADMIN — CARBIDOPA AND LEVODOPA SCH TAB: 25; 100 TABLET ORAL at 22:55

## 2019-12-04 RX ADMIN — CARBIDOPA AND LEVODOPA SCH TAB: 25; 100 TABLET ORAL at 17:46

## 2019-12-04 RX ADMIN — TRAZODONE HYDROCHLORIDE SCH MG: 100 TABLET ORAL at 20:00

## 2019-12-04 RX ADMIN — MIRTAZAPINE SCH MG: 15 TABLET, ORALLY DISINTEGRATING ORAL at 19:59

## 2019-12-05 VITALS — SYSTOLIC BLOOD PRESSURE: 150 MMHG | DIASTOLIC BLOOD PRESSURE: 80 MMHG

## 2019-12-05 VITALS — SYSTOLIC BLOOD PRESSURE: 130 MMHG | DIASTOLIC BLOOD PRESSURE: 80 MMHG

## 2019-12-05 LAB
ALBUMIN SERPL-MCNC: 3.2 G/DL (ref 3.4–5)
ALBUMIN/GLOB SERPL: 1.1 {RATIO} (ref 1–1.7)
ALP SERPL-CCNC: 84 U/L (ref 46–116)
ALT SERPL-CCNC: 15 U/L (ref 16–63)
ANION GAP SERPL CALC-SCNC: 11 MMOL/L (ref 6–14)
AST SERPL-CCNC: 80 U/L (ref 15–37)
BASOPHILS # BLD AUTO: 0 X10^3/UL (ref 0–0.2)
BASOPHILS NFR BLD: 1 % (ref 0–3)
BILIRUB SERPL-MCNC: 0.4 MG/DL (ref 0.2–1)
BUN/CREAT SERPL: 43 (ref 6–20)
CA-I SERPL ISE-MCNC: 34 MG/DL (ref 8–26)
CALCIUM SERPL-MCNC: 8.9 MG/DL (ref 8.5–10.1)
CHLORIDE SERPL-SCNC: 109 MMOL/L (ref 98–107)
CO2 SERPL-SCNC: 26 MMOL/L (ref 21–32)
CREAT SERPL-MCNC: 0.8 MG/DL (ref 0.7–1.3)
EOSINOPHIL NFR BLD: 0.1 X10^3/UL (ref 0–0.7)
EOSINOPHIL NFR BLD: 3 % (ref 0–3)
ERYTHROCYTE [DISTWIDTH] IN BLOOD BY AUTOMATED COUNT: 12.9 % (ref 11.5–14.5)
GFR SERPLBLD BASED ON 1.73 SQ M-ARVRAT: 96.4 ML/MIN
GLOBULIN SER-MCNC: 3 G/DL (ref 2.2–3.8)
GLUCOSE SERPL-MCNC: 102 MG/DL (ref 70–99)
HCT VFR BLD CALC: 39.6 % (ref 39–53)
HGB BLD-MCNC: 13.4 G/DL (ref 13–17.5)
LYMPHOCYTES # BLD: 0.6 X10^3/UL (ref 1–4.8)
LYMPHOCYTES NFR BLD AUTO: 16 % (ref 24–48)
MCH RBC QN AUTO: 30 PG (ref 25–35)
MCHC RBC AUTO-ENTMCNC: 34 G/DL (ref 31–37)
MCV RBC AUTO: 87 FL (ref 79–100)
MONO #: 0.4 X10^3/UL (ref 0–1.1)
MONOCYTES NFR BLD: 10 % (ref 0–9)
NEUT #: 2.6 X10^3UL (ref 1.8–7.7)
NEUTROPHILS NFR BLD AUTO: 71 % (ref 31–73)
PLATELET # BLD AUTO: 229 X10^3/UL (ref 140–400)
POTASSIUM SERPL-SCNC: 4 MMOL/L (ref 3.5–5.1)
PROT SERPL-MCNC: 6.2 G/DL (ref 6.4–8.2)
RBC # BLD AUTO: 4.54 X10^6/UL (ref 4.3–5.7)
SODIUM SERPL-SCNC: 146 MMOL/L (ref 136–145)
VAL ACID: 57 MCG/ML (ref 50–100)
WBC # BLD AUTO: 3.6 X10^3/UL (ref 4–11)

## 2019-12-05 RX ADMIN — MEMANTINE HYDROCHLORIDE SCH MG: 10 TABLET ORAL at 08:54

## 2019-12-05 RX ADMIN — TRAZODONE HYDROCHLORIDE SCH MG: 100 TABLET ORAL at 21:00

## 2019-12-05 RX ADMIN — CARBIDOPA AND LEVODOPA SCH TAB: 25; 100 TABLET ORAL at 18:01

## 2019-12-05 RX ADMIN — FINASTERIDE SCH MG: 5 TABLET, FILM COATED ORAL at 08:53

## 2019-12-05 RX ADMIN — Medication SCH MG: at 08:55

## 2019-12-05 RX ADMIN — CARBIDOPA AND LEVODOPA SCH TAB: 25; 100 TABLET ORAL at 11:27

## 2019-12-05 RX ADMIN — CARBIDOPA AND LEVODOPA SCH TAB: 25; 100 TABLET ORAL at 14:17

## 2019-12-05 RX ADMIN — Medication SCH MCG: at 08:53

## 2019-12-05 RX ADMIN — RIVASTIGMINE SCH PATCH: 9.5 PATCH, EXTENDED RELEASE TRANSDERMAL at 08:54

## 2019-12-05 RX ADMIN — POLYETHYLENE GLYCOL 3350 SCH GM: 17 POWDER, FOR SOLUTION ORAL at 21:00

## 2019-12-05 RX ADMIN — DEXTROSE SCH MLS/HR: 5 SOLUTION INTRAVENOUS at 18:00

## 2019-12-05 RX ADMIN — ENTACAPONE SCH MG: 200 TABLET, FILM COATED ORAL at 21:00

## 2019-12-05 RX ADMIN — ENTACAPONE SCH MG: 200 TABLET, FILM COATED ORAL at 08:54

## 2019-12-05 RX ADMIN — DIVALPROEX SODIUM SCH MG: 500 TABLET, FILM COATED, EXTENDED RELEASE ORAL at 21:00

## 2019-12-05 RX ADMIN — CARBIDOPA AND LEVODOPA SCH TAB: 25; 100 TABLET ORAL at 05:52

## 2019-12-05 RX ADMIN — MELOXICAM SCH MG: 7.5 TABLET ORAL at 08:54

## 2019-12-05 RX ADMIN — QUETIAPINE FUMARATE SCH MG: 25 TABLET, FILM COATED ORAL at 14:00

## 2019-12-05 RX ADMIN — MEMANTINE HYDROCHLORIDE SCH MG: 10 TABLET ORAL at 21:00

## 2019-12-05 RX ADMIN — CARBIDOPA AND LEVODOPA SCH TAB.SA: 25; 100 TABLET, EXTENDED RELEASE ORAL at 21:00

## 2019-12-05 RX ADMIN — CHOLECALCIFEROL CAP 1.25 MG (50000 UNIT) SCH UNIT: 1.25 CAP at 08:52

## 2019-12-05 RX ADMIN — CARBIDOPA AND LEVODOPA SCH TAB: 25; 100 TABLET ORAL at 22:00

## 2019-12-05 RX ADMIN — POLYETHYLENE GLYCOL 3350 SCH GM: 17 POWDER, FOR SOLUTION ORAL at 08:52

## 2019-12-05 RX ADMIN — MELATONIN TAB 3 MG SCH MG: 3 TAB at 21:00

## 2019-12-05 RX ADMIN — QUETIAPINE FUMARATE SCH MG: 25 TABLET, FILM COATED ORAL at 08:53

## 2019-12-05 RX ADMIN — METOPROLOL SUCCINATE SCH MG: 50 TABLET, EXTENDED RELEASE ORAL at 08:53

## 2019-12-05 RX ADMIN — QUETIAPINE FUMARATE SCH MG: 100 TABLET, FILM COATED ORAL at 21:00

## 2019-12-05 RX ADMIN — CARBIDOPA AND LEVODOPA SCH TAB: 25; 100 TABLET ORAL at 14:00

## 2019-12-05 RX ADMIN — MIRTAZAPINE SCH MG: 15 TABLET, ORALLY DISINTEGRATING ORAL at 21:00

## 2019-12-05 RX ADMIN — CITALOPRAM HYDROBROMIDE SCH MG: 20 TABLET ORAL at 08:54
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
## 2019-12-05 NOTE — PN
DATE:  12/04/2019



SUBJECTIVE:  The patient was seen today, met with the staff, chart reviewed and

also covering for Dr. Tejada.  The patient is also exhibiting behavior problems,

threatening, breaking things around him, destruction of property, paranoid and

delusional.  The patient also has history of seizures, sleep apnea.  The patient

was not able to show any insight to his problems.  Poor eye contact.  The

patient also has difficulty standing up.  The patient also has involuntary

movements.  The patient is also very confused and explosive behaviors.



OBSERVATION:

VITAL SIGNS:  Temperature 96.4, blood pressure 95/50, pulse 91, respirations 22,

O2 sat 96%.  Slept about 5 hours last night.  The patient's appetite poor.



LABORATORY DATA:  The patient's lab reviewed, no major changes from the previous

levels.



MEDICATIONS:  The patient's current medications include Depakote 1000 mg at

night, Exelon patch 1 daily.  The patient also on Sinemet for Parkinson's,

Namenda 10 mg b.i.d., mirtazapine 7.5 mg at night, lorazepam 1 mg b.i.d.,

trazodone 100 mg at bedtime p.r.n.  The patient is also on Seroquel 25 mg b.i.d.

and Celexa 20 mg daily and Seroquel 100 mg at night, also on melatonin 6 mg at

night.  The patient is not having any major side effects.



ASSESSMENT:  Lewy body dementia with delusions and behavioral disturbances;

anxiety disorder, unspecified; impulse control disorder, unspecified.





______________________________

CAITLYN GUEVARA MD DR:  CULLEN/georgina  JOB#:  [id number] / [id number]

DD:  12/04/2019 18:25  DT:  12/05/2019 01:20

## 2019-12-05 NOTE — PN
DATE:  12/05/2019



SUBJECTIVE:  The patient was seen today, met with the staff, chart reviewed. 

The patient continues to have problems, fluctuating symptoms, increased

confusion, unsteady on his feet, decreased appetite.  The patient is able to

walk, but unsteady.  The patient is also drowsy at times.



OBSERVATION:

VITAL SIGNS:  Temperature 98.4, blood pressure 138/80, pulse 60, respirations

16, O2 sat 98%.  Slept about 8 hours last night.



LABORATORY DATA:  The patient's lab reviewed.



MEDICATIONS:  The patient's current medications include Depakote 100 mg at

night, Exelon patch 1 daily, Namenda 10 mg b.i.d., mirtazapine 7.5 mg at night,

lorazepam 1 mg b.i.d., trazodone 100 mg at bedtime p.r.n.  The patient is also

on Seroquel 25 mg b.i.d. and Celexa 25 mg daily and Seroquel 100 mg at night. 

The patient is also on melatonin 6 mg at night.  The patient is not having any

major side effects.



ASSESSMENT:  Lewy body dementia with behavior problems; anxiety disorder,

unspecified; and impulse control disorder, unspecified.



PLAN:  Continue with the treatment.



LENGTH OF STAY:  7 days.





______________________________

CAITLYN GUEVARA MD



DR:  CULLEN/georgina  JOB#:  [id number] / [id number]

DD:  12/05/2019 15:11  DT:  12/05/2019 21:17

## 2019-12-06 VITALS — DIASTOLIC BLOOD PRESSURE: 58 MMHG | SYSTOLIC BLOOD PRESSURE: 90 MMHG

## 2019-12-06 VITALS — DIASTOLIC BLOOD PRESSURE: 75 MMHG | SYSTOLIC BLOOD PRESSURE: 128 MMHG

## 2019-12-06 VITALS — SYSTOLIC BLOOD PRESSURE: 128 MMHG | DIASTOLIC BLOOD PRESSURE: 82 MMHG

## 2019-12-06 RX ADMIN — MEMANTINE HYDROCHLORIDE SCH MG: 10 TABLET ORAL at 21:01

## 2019-12-06 RX ADMIN — QUETIAPINE FUMARATE SCH MG: 25 TABLET, FILM COATED ORAL at 14:33

## 2019-12-06 RX ADMIN — QUETIAPINE FUMARATE SCH MG: 25 TABLET, FILM COATED ORAL at 08:51

## 2019-12-06 RX ADMIN — CARBIDOPA AND LEVODOPA SCH TAB: 25; 100 TABLET ORAL at 08:51

## 2019-12-06 RX ADMIN — CITALOPRAM HYDROBROMIDE SCH MG: 20 TABLET ORAL at 08:51

## 2019-12-06 RX ADMIN — MEMANTINE HYDROCHLORIDE SCH MG: 10 TABLET ORAL at 08:51

## 2019-12-06 RX ADMIN — TRAZODONE HYDROCHLORIDE SCH MG: 100 TABLET ORAL at 21:01

## 2019-12-06 RX ADMIN — ENTACAPONE SCH MG: 200 TABLET, FILM COATED ORAL at 08:50

## 2019-12-06 RX ADMIN — CARBIDOPA AND LEVODOPA SCH TAB: 25; 100 TABLET ORAL at 18:32

## 2019-12-06 RX ADMIN — CARBIDOPA AND LEVODOPA SCH TAB: 25; 100 TABLET ORAL at 20:59

## 2019-12-06 RX ADMIN — DIVALPROEX SODIUM SCH MG: 500 TABLET, FILM COATED, EXTENDED RELEASE ORAL at 20:59

## 2019-12-06 RX ADMIN — POLYETHYLENE GLYCOL 3350 SCH GM: 17 POWDER, FOR SOLUTION ORAL at 21:01

## 2019-12-06 RX ADMIN — POLYETHYLENE GLYCOL 3350 SCH GM: 17 POWDER, FOR SOLUTION ORAL at 08:50

## 2019-12-06 RX ADMIN — CARBIDOPA AND LEVODOPA SCH TAB.SA: 25; 100 TABLET, EXTENDED RELEASE ORAL at 21:02

## 2019-12-06 RX ADMIN — DEXTROSE SCH MLS/HR: 5 SOLUTION INTRAVENOUS at 08:49

## 2019-12-06 RX ADMIN — RIVASTIGMINE SCH PATCH: 9.5 PATCH, EXTENDED RELEASE TRANSDERMAL at 08:52

## 2019-12-06 RX ADMIN — METOPROLOL SUCCINATE SCH MG: 50 TABLET, EXTENDED RELEASE ORAL at 08:51

## 2019-12-06 RX ADMIN — FINASTERIDE SCH MG: 5 TABLET, FILM COATED ORAL at 08:51

## 2019-12-06 RX ADMIN — Medication SCH MG: at 08:53

## 2019-12-06 RX ADMIN — MELATONIN TAB 3 MG SCH MG: 3 TAB at 20:59

## 2019-12-06 RX ADMIN — MIRTAZAPINE SCH MG: 15 TABLET, ORALLY DISINTEGRATING ORAL at 21:00

## 2019-12-06 RX ADMIN — QUETIAPINE FUMARATE SCH MG: 100 TABLET, FILM COATED ORAL at 21:02

## 2019-12-06 RX ADMIN — MELOXICAM SCH MG: 7.5 TABLET ORAL at 08:50

## 2019-12-06 RX ADMIN — CARBIDOPA AND LEVODOPA SCH TAB: 25; 100 TABLET ORAL at 14:33

## 2019-12-06 RX ADMIN — Medication SCH MCG: at 08:51

## 2019-12-06 RX ADMIN — CARBIDOPA AND LEVODOPA SCH TAB: 25; 100 TABLET ORAL at 06:07

## 2019-12-06 RX ADMIN — ENTACAPONE SCH MG: 200 TABLET, FILM COATED ORAL at 20:59

## 2019-12-06 NOTE — PN
DATE:  11/30/2019



SUBJECTIVE:  The patient denies any new medical or neurological complaints;

however, he has been having tremor of the upper extremities.  He has been

intermittently agitated, restless and psychotic.  He denies headaches, visual

disturbances, chest pain, shortness of breath or palpitation, dysarthria or

dysphagia.  He sleeps 5 hours at night and he sleeps 2-3 hours during the day.



OBJECTIVE:

GENERAL:  Well-developed, well-nourished male, not in acute distress.

VITAL SIGNS:  Blood pressure 177/98, respiratory rate 16, pulse is 81,

temperature 97.3, oxygen saturation 99% on room air.

HEENT:  Normocephalic, atraumatic, otherwise unremarkable.

NECK:  Supple.  Negative for carotid bruit, lymphadenopathy or thyromegaly.

LUNGS:  Clear to A and P.

CARDIOVASCULAR:  Regular rhythm, normal S1, S2.  There is no S3, S4 or murmur.

ABDOMEN:  Soft.  Bowel sounds positive.

EXTREMITIES:  Negative for cyanosis, clubbing, pitting edema.

NEUROLOGICAL EXAM:  Mental Status:  The patient is alert and oriented to himself

and date and month.  Speech is fluent.  There is no language dysfunction. 

Memory, judgment, and abstracting thinking are poor.  The patient denies

hallucination or delusion.

CRANIAL NERVES:  Visual fields are full.  Cranial nerves are intact.  Motor

examination:  No focal muscle bulk was seen.  The tone is normal.  The strength

is 5/5 in the upper extremities and 4/5 in the lower extremities.  The patient

has intermittent marked resting tremor of both upper extremities.  He also has

postural and kinetic tremors of moderate severity.  Sensory examination revealed

normal pinprick and light touch senses throughout.  Deep tendon reflexes were

symmetric and hypoactive with absent Achilles responses.  Gait:  The stance is

steady.  The patient walked without assistance.



IMPRESSION:

1.  Parkinson disease, presented with resting tremor, kinetic and postural

tremors of both upper extremities.

2.  Multiple medical problems include hypertension, hyperlipidemia,

hypothyroidism, gastroesophageal reflux disease.

3.  Dementia of Alzheimer type and possible Lewy body dementia.

4.  Multiple psychiatric problems include major depression, behavior

disturbances, and anxiety with intermittent psychosis.



RECOMMENDATIONS:

1.  Continue with current management for Parkinson disease.

2.  Continue with current medical and psychiatric care.  The patient was started

on Exelon patch for possible Lewy body dementia and he has been on Depakote ER 1

gram at bedtime for depression and mood disorders.  The patient will continue

with Comtan, carbidopa/levodopa immediate and extended release as prescribed

before.





______________________________

M JESSE TOMLINSON MD



DR:  KELLEN/georgina  JOB#:  [id number] / [id number]

DD:  12/06/2019 00:37  DT:  12/06/2019 07:31

## 2019-12-06 NOTE — PN
DATE:  11/28/2019



SUBJECTIVE:  The patient denies any new medical or neurological complaints;

however, he continues to have tremor of the upper extremities.  The patient has

been extremely agitated in the last 2 days.  He required Haldol IM at bedtime. 

However, he sleeps and eats well.  He denies any recent falls.



OBJECTIVE:

GENERAL:  Well-developed, well-nourished white male, not in acute distress.

VITAL SIGNS:  Blood pressure 103/65, respiratory rate is 16, pulse is 73, oxygen

saturation is 96% on room air.

HEENT:  Normocephalic, atraumatic, otherwise unremarkable.

NECK:  Supple.  Negative for carotid bruit, lymphadenopathy or thyromegaly.

LUNGS:  Clear to A and P.

CARDIOVASCULAR:  Regular rhythm, normal S1, S2.  There is no S3, S4 or murmur.

ABDOMEN:  Soft.  Bowel sounds positive.

EXTREMITIES:  Negative for cyanosis, clubbing or edema.

NEUROLOGICAL:  Mental status:  The patient is alert and oriented to time and

place.  Speech is fluent.  There is no language dysfunction.  Memory, judgment

and abstracting thinking are poor.  The patient denies hallucination.  Cranial

nerves are intact.  Motor examination revealed marked intermittent resting

tremor of both upper extremities.  The patient also had mild postural tremor and

kinetic tremor as well.  The tone is normal.  The strength is 4/5 throughout. 

Sensory examination revealed normal pinprick and light touch senses throughout. 

Deep tendon reflexes were symmetric and hypoactive with absent Achilles

responses.  Gait and coordination are normal.  The patient swings his upper

extremities.



DIAGNOSTIC DATA:  CBC revealed white blood cells of 4200, hemoglobin 13.2,

hematocrit 39.4, platelet count 212,000.  Chemistry revealed sodium of 142,

potassium 4.4, chloride 108, CO2 of 27, BUN 21, creatinine 0.9, glucose 95,

calcium 8.8.  Liver enzymes are low.  Initial nonenhanced head CT scan revealed

no evidence of acute intracranial process.



IMPRESSION:

1.  Parkinson disease with marked resting tremor of the upper extremities.

2.  Multiple medical problems include hypertension, hyperlipidemia,

hypothyroidism, dementia of Alzheimer and possible Lewy body dementia.

3.  Multiple psychiatric problems including major depressions, behavior

disturbances and psychosis.



RECOMMENDATIONS:  We will continue with current medical and psychiatric care.





______________________________

M JESSE TOMLINSON MD



DR:  KELLEN/georgina  JOB#:  [id number] / [id number]

DD:  12/06/2019 00:24  DT:  12/06/2019 00:43

## 2019-12-06 NOTE — PN
DATE:  11/25/2019



SUBJECTIVE:  The patient denies any new medical or neurological complaints.  He

is cooperative and denies headaches, visual disturbances, nausea, vomiting,

chest pain, shortness of breath or palpitation.  He continues to have a resting

tremor.  He slept and ate well.  However, he becomes more agitated with his

visiting son today.  He stated he wants to leave.



OBJECTIVE:

GENERAL:  Well-developed, well-nourished male, not in acute distress.

VITAL SIGNS:  Blood pressure 113/67, respiratory rate is 16, pulse is 78 and

regular, temperature 98, oxygen saturation 97% on room air.

HEENT:  Normocephalic, atraumatic, otherwise unremarkable.

NECK:  Supple.  Negative for carotid bruit, lymphadenopathy or thyromegaly.

LUNGS:  Clear to A and P.

CARDIOVASCULAR:  Regular rate and rhythm, normal S1, S2.

ABDOMEN:  Soft.  Bowel sounds positive.

EXTREMITIES:  Negative for cyanosis, clubbing or pitting edema.

NEUROLOGICAL EXAMINATION:  The patient is alert and oriented to himself, date

and place.  Speech is fluent.  There is no language dysfunction.  Memory,

judgment, and abstract thinking are poor.  The patient denies hallucination or

delusion.  Cranial nerves are grossly intact and no focal motor deficit.  The

patient continues to have resting and postural and kinetic tremors of both upper

extremities.  The tone is normal.  Sensory examination:  Normal pinprick and

light touch senses.  Deep tendon reflexes were asymmetric and hypoactive with

absent Achilles responses.  Gait:  The stance is steady.  The patient walks

without assistance and he swings his upper extremities.



IMPRESSION:

1.  Parkinson disease.

2.  Dementia, probably Alzheimer versus Lewy body dementia.

3.  Multiple medical problems, include hypertension, hyperlipidemia,

hypothyroidism, gastroesophageal reflux disease, sleep apnea, seizure.

4.  Multiple psychiatric problems, include dementia, behavior disturbances,

major depressions and hallucination with psychosis.



RECOMMENDATIONS:  Continue with carbidopa/levodopa immediate and extended

release and Comtan.  Continue with current medical and psychiatric care.





______________________________

M JESSE TOMLINSON MD



DR:  KELLEN/georgina  JOB#:  [id number] / [id number]

DD:  12/06/2019 00:13  DT:  12/06/2019 04:41

## 2019-12-06 NOTE — PN
DATE:  11/30/2019



SUBJECTIVE:  The patient denies any new medical or neurological complaints;

however, he has been having tremor of the upper extremities.  He has been

intermittently agitated, restless and psychotic.  He denies headaches, visual

disturbances, chest pain, shortness of breath, palpitation, dysarthria, or

dysphagia.  He sleeps 5 hours at night and he sleeps 2-3 hours during the day.



OBJECTIVE:

GENERAL:  Well-developed, well-nourished male, not in acute distress.

VITAL SIGNS:  Blood pressure 177/98, respiratory rate 16, pulse is 81,

temperature 97.3, oxygen saturation 99% on room air.

HEENT:  Normocephalic, atraumatic, otherwise unremarkable.

NECK:  Supple.  Negative for carotid bruit, lymphadenopathy, or thyromegaly.

LUNGS:  Clear to A and P.

CARDIOVASCULAR:  Regular rate and rhythm, normal S1, S2.  There is no S3, S4 or

murmur.

ABDOMEN:  Soft.  Bowel sounds positive.

EXTREMITIES:  Negative for cyanosis, clubbing, or pitting edema.

NEUROLOGICAL EXAM:  Mental Status:  The patient is alert and oriented to

himself, date, and month.  Speech is fluent.  There is no language dysfunction. 

Memory, judgment, and abstracting thinking are poor.  The patient denies

hallucination or delusion.

CRANIAL NERVES:  Visual fields are full.  Cranial nerves are intact.  Motor

examination:  No focal muscle bulk was seen.  The tone is normal.  The strength

is 5/5 in the upper extremities and 4/5 in the lower extremities.  The patient

has intermittent resting tremor of both upper extremities.  He also has postural

and kinetic tremors of moderate severity.  Sensory examination revealed normal

pinprick and light touch senses throughout.  Deep tendon reflexes were symmetric

and hypoactive with absent Achilles responses.  Gait:  The stance is steady. 

The patient walked without assistance.



IMPRESSION:

1.  Parkinson disease, presented with resting tremor, kinetic and postural

tremors of both upper extremities.

2.  Multiple medical problems include hypertension, hyperlipidemia,

hypothyroidism, GERD, and dementia.

3.  Dementia of Alzheimer type and possible Lewy body dementia.

4.  Multiple psychiatric problems include major depressions, behavior

disturbances, and anxiety with intermittent psychosis.



RECOMMENDATIONS:

1.  Continue with current management for Parkinson disease.

2.  Continue with current medical and psychiatric care.  The patient was started

on Exelon patch for possible Lewy body dementia and he has been on Depakote ER 1

g at bedtime for depressions and mood disorders.  The patient will continue with

Comtan, carbidopa/levodopa immediate and extended release as prescribed before.





______________________________

M JESSE TOMLINSON MD



DR:  KELLEN/georgina  JOB#:  [id number] / [id number]

DD:  12/06/2019 00:37  DT:  12/06/2019 07:16

## 2019-12-06 NOTE — CONS
DATE OF CONSULTATION:  11/24/2019



NEUROLOGY CONSULTATION



REFERRING PHYSICIAN:  Dr. Tejada.



REASON FOR CONSULTATION:  Severe Parkinson disease.



HISTORY OF PRESENT ILLNESS:  This is a 69-year-old  male who was

admitted through Emergency Room on 11/21/2019 on account of severe behavior

disturbances, described as delusion and agitation to the point he became

extremely dangerous threatening everybody verbally and physically at [facility].  Neuro consult was requested because the patient has had

severe Parkinson disease diagnosed several years ago.  The patient apparently

has history of slowly progressive dementia.  He failed a psychiatric care at

[facility] in October of this year.  The patient denies headaches, visual

disturbances, nausea, vomiting, chest pain, shortness of breath or palpitation,

dysarthria, dysphagia.  He denies any recent falls.



PAST MEDICAL HISTORY:  Significant for Parkinson disease, presented with

markedly resting tremors, history of seizure disorder, hypertension, sleep

apnea, osteoarthritis, hypothyroidism, hyperlipidemia, GERD and insomnia. 



PAST SURGICAL HISTORY:  Negative.



SOCIAL HISTORY:  The patient is a nursing home resident.  He denies smoking,

alcohol drinking, or illicit drug use.



FAMILY HISTORY:  Unremarkable.



CURRENT HOME MEDICATIONS:  Carbidopa/levodopa, Seroquel, Nuplazid, Aricept,

diltiazem, meloxicam, citalopram, vitamin B12, vitamin D and calcium,

finasteride, melatonin for insomnia, Comtan.



ALLERGIES:  AMITRIPTYLINE, CEFTRIAXONE, FOLIC ACID, MIRAPEX, REQUIP AND AMBIEN.



REVIEW OF SYSTEMS:  A 10-point review of systems as described above in the

history of present illness.



PHYSICAL EXAMINATION:

GENERAL:  Well-developed, well-nourished male, not in acute distress.  He weighs

77 kilos.

VITAL SIGNS:  Blood pressure 142/84, respiratory rate 18, pulse is 89 and oxygen

saturation 95% on room air.

HEENT:  Normocephalic, atraumatic, otherwise unremarkable.

NECK:  Supple.  Negative for carotid bruit, lymphadenopathy or thyromegaly.

LUNGS:  Clear to A and P.

CARDIOVASCULAR:  Regular rate and rhythm.  Normal S1, S2.

ABDOMEN:  Soft.  Bowel sounds positive.

EXTREMITIES:  Negative for cyanosis, clubbing or pitting edema.

NEUROLOGICAL EXAMINATION:  

MENTAL STATUS:  The patient is alert and oriented to time and place.  Speech is

fluent.  There is no language dysfunction.  Memory, judgment, and abstract

thinking are poor.  The patient is paranoid but he denies hallucination.

CRANIAL NERVES:  Visual fields are full.  The pupils are reactive to light and

accommodation.  The extraocular movements are intact.  There is no nystagmus. 

There is no facial motor or sensory deficit.  Hearing is intact bilaterally. 

The palate is elevated symmetrically.  Sternocleidomastoid muscles are powerful

bilaterally.  The patient shrugs her shoulders symmetrically, protrudes her

tongue without fasciculation or atrophy.

MOTOR:  No focal muscle bulk was seen.  The tone is normal.  The strength is 4/5

throughout.

SENSORY EXAMINATION:  Revealed normal pinprick, light touch, vibratory and

position senses.  Deep tendon reflexes were asymmetric and hypoactive with

absent Achilles responses.

GAIT:  The stance is steady.  The patient walked without assistance.  During the

walking, the patient has a resting tremor of the hands, but he swings his arms

symmetrically.



LABORATORY DATA:  11/21/2019 revealed white blood cells of 4.7, hemoglobin 12.7,

hematocrit 38.2, platelet count 176,000.  Chemistry revealed sodium 144,

potassium 4.4, chloride 108, CO2 of 29, BUN 23, creatinine 0.8, glucose 95,

calcium 8.7, magnesium 2.  Liver enzymes are normal.  TIBC is low.  A1c is 5.3. 

Lipid profile revealed normal range.  Vitamin D is 45.  TSH, T4 and T3 are

within normal range.  Urinalysis is negative for urinary tract infection. 

Treponema pallidum antibodies nonreactive.



IMPRESSION:

1.  Marked Parkinson disease, presented with resting tremor with mild increased

rigidity in the lower extremities without significant gait disturbances.

2.  Slowly progressive dementia, probably Alzheimer type versus Lewy body

dementia.

3.  Multiple medical problems include history of seizure disorders,

hypertension, obstructive sleep apnea, hypothyroidism, hyperlipidemia,

gastroesophageal reflux disease.

4.  Multiple psychiatric problems including delusional disorders and possible

major depression disorders and intermittent psychosis.



RECOMMENDATIONS:  Continue with current medications for Parkinson disease

including carbidopa/levodopa ER 25/100 mg at bedtime and carbidopa/levodopa

25/100 immediate release to be taken 2 tablets 5 times within 24 hours while

awake, Comtan and other home medications.





______________________________

SLICK TOMLINSON MD



DR:  KELLEN/georgina  JOB#:  [id number] / [id number]

DD:  12/06/2019 00:00  DT:  12/06/2019 03:28

## 2019-12-06 NOTE — PN
DATE:  11/28/2019



SUBJECTIVE:  The patient denies any new medical or neurological complaints;

however, he continues to have tremor of the upper extremities.  The patient has

been extremely agitated in the last 2 days.  He required Haldol IM at bedtime. 

However, he sleeps and eats well.  He denies any recent falls.



OBJECTIVE:

GENERAL:  Well-developed, well-nourished white male, not in acute distress.

VITAL SIGNS:  Blood pressure 103/65, respiratory rate is 16, pulse is 73, oxygen

saturation is 96% on room air.

HEENT:  Normocephalic, atraumatic, otherwise unremarkable.

NECK:  Supple.  Negative for carotid bruit, lymphadenopathy or thyromegaly.

LUNGS:  Clear to A and P.

CARDIOVASCULAR:  Regular rhythm, normal S1, S2.  There is no S3, S4 or murmur.

ABDOMEN:  Soft.  Bowel sounds positive.

EXTREMITIES:  Negative for cyanosis, clubbing or edema.

NEUROLOGICAL EXAM:  Mental Status:  The patient is alert and oriented to time

and place.  Speech is fluent.  There is no language dysfunction.  Memory,

judgment and abstracting thinking are poor.  The patient denies hallucination. 

Cranial nerves are intact.  Motor examination revealed marked intermittent

resting tremor of both upper extremities.  The patient also had mild postural

tremor and kinetic tremor as well.  The tone is normal.  The strength is 4/5

throughout.  Sensory examination revealed normal pinprick and light touch senses

throughout.  Deep tendon reflexes were symmetric and hypoactive with absent

Achilles responses.  Gait and coordination are normal.  The patient swings his

upper extremities.



LABORATORY DATA:  CBC revealed white blood cells of 4.2 thousand, hemoglobin

13.2, hematocrit 39.4, platelet count 212,000.  Chemistry revealed sodium of

142, potassium 4.4, chloride 108, CO2 of 27, BUN 21, creatinine 0.9, glucose 95,

calcium 8.8.  Liver enzymes are low.  Initial nonenhanced head CT scan revealed

no acute intracranial process.



IMPRESSION:

1.  Parkinson's disease with marked resting tremor of the upper extremities.

2.  Multiple medical problems include hypertension, hyperlipidemia,

hypothyroidism, dementia of Alzheimer and possible Lewy body dementia.

3.  Multiple psychiatric problems including major depressions, behavior

disturbances and psychosis.



RECOMMENDATIONS:  Continue with current medical and psychiatric care.





______________________________

M JESSE TOMLINSON MD



DR:  KELLEN/georgina  JOB#:  [id number] / [id number]

DD:  12/06/2019 00:24  DT:  12/06/2019 07:21

## 2019-12-06 NOTE — PN
DATE:  11/25/2019



SUBJECTIVE:  The patient denies any new medical or neurological complaints.  He

is cooperative and denies headaches, visual disturbances, nausea, vomiting,

chest pain, shortness of breath or palpitation.  He continues to have resting

tremor.  He slept and ate well.  However, he becomes more agitated with his

visiting son today.  He stated he wants to leave.



OBJECTIVE:

GENERAL:  Well-developed, well-nourished male, not in acute distress.

VITAL SIGNS:  Blood pressure 113/67, respiratory rate is 16, pulse is 78 and

regular, temperature ____, oxygen saturation 97% on room air.

HEENT:  Normocephalic, atraumatic, otherwise unremarkable.

NECK:  Supple.  Negative for carotid bruit, lymphadenopathy or thyromegaly.

LUNGS:  Clear to A and P.

CARDIOVASCULAR:  Regular rate and rhythm, normal S1, S2.

ABDOMEN:  Soft.  Bowel sounds positive.

EXTREMITIES:  Negative for cyanosis, clubbing or edema.

NEUROLOGICAL EXAM:  The patient is alert and oriented to himself, date and

place.  Speech is fluent.  There is no language dysfunction.  Memory, judgment,

and abstract thinking are poor.  The patient denies hallucination or delusion. 

Cranial nerves are grossly intact and no focal motor deficit.  The patient

continues to have resting and postural and kinetic tremors of both upper

extremities.  The tone is normal.  Sensory examination:  Normal pinprick, light

touch senses.  Deep tendon reflexes were symmetric and hypoactive with absent

Achilles responses.  Gait:  The stance is steady.  The patient walks without

assistance and he swings his upper extremities.



IMPRESSION:

1.  Parkinson disease.

2.  Dementia, probably of Alzheimer versus Lewy body dementia.

3.  Multiple medical problems include hypertension, hyperlipidemia,

hypothyroidism, gastroesophageal reflux disease, sleep apnea, seizure.

4.  Multiple medical problems include dementia, behavior disturbances, major

depressions and hallucination with psychosis.



RECOMMENDATIONS:  Continue with carbidopa/levodopa immediate and extended

release, and Comtan.  Continue with current medical and psychiatric care.





______________________________

M JESSE TOMLINSON MD



DR:  KELLEN/georgina  JOB#:  [id number] / [id number]

DD:  12/06/2019 00:13  DT:  12/06/2019 07:08

## 2019-12-06 NOTE — CONS
DATE OF CONSULTATION:  11/24/2019



NEUROLOGICAL CONSULTATION



REFERRING PHYSICIAN:  Dr. Tejada.



REASON FOR CONSULTATION:  Severe Parkinson disease.



HISTORY OF PRESENT ILLNESS:  This is a 69-year-old  male who was

admitted through Emergency Room on 11/21/2019 on account of severe behavior

disturbances, described as delusion, agitation to the point he became extremely

dangerous, threatening everybody verbally and physically at the [facility].  Neuro consult was requested because the patient has had severe

Parkinson disease diagnosed several years ago.  The patient apparently has

history of slowly progressive dementia.  He failed psychiatric care at [place]

in October of this year.  The patient denies headaches, visual disturbances,

nausea, vomiting, chest pain, shortness of breath or palpitation, dysarthria,

dysphagia.  He denies any recent falls.



PAST MEDICAL HISTORY:  Significant for Parkinson disease, presented with marked

resting tremors, history of seizure disorder, hypertension, sleep apnea,

osteoarthritis, hypothyroidism, hyperlipidemia, GERD and insomnia.



PAST SURGICAL HISTORY:  Negative.



SOCIAL HISTORY:  The patient is a nursing home resident.  He denies smoking,

alcohol drinking, or illicit drug use.



FAMILY HISTORY:  Unremarkable.



CURRENT HOME MEDICATIONS:  Carbidopa/levodopa, Seroquel, Nuplazid, Aricept,

diltiazem, meloxicam, citalopram, vitamin B12, vitamin D and calcium,

finasteride, melatonin for insomnia and Comtan.



ALLERGIES:  AMITRIPTYLINE, CEFTRIAXONE, FOLIC ACID, MIRAPEX, REQUIP AND AMBIEN.



REVIEW OF SYSTEMS:  A 10-point review of systems as described above in the

history of present illness.



PHYSICAL EXAMINATION:

GENERAL:  Well-developed, well-nourished male, not in acute distress.  He weighs

77 kilos.

VITAL SIGNS:  Blood pressure 142/84, respiratory rate 18, pulse is 89 and oxygen

saturation 95% on room air.

HEENT:  Normocephalic, atraumatic, otherwise unremarkable.

NECK:  Supple.  Negative for carotid bruit, lymphadenopathy or thyromegaly.

LUNGS:  Clear to A and P.

CARDIOVASCULAR:  Regular rate and rhythm, normal S1, S2.

ABDOMEN:  Soft.  Bowel sounds positive.

EXTREMITIES:  Negative for cyanosis, clubbing or pitting edema.

NEUROLOGICAL EXAMINATION:

1.  MENTAL STATUS:  The patient is alert and oriented to time and place.  Speech

is fluent.  There is no language dysfunction.  Memory, judgment, and abstracting

thinking are poor.  The patient is paranoid, but he denies hallucination.

2.  CRANIAL NERVES:  Visual fields are full.  The pupils are reactive to light

and accommodation.  The extraocular movements are intact.  There is no

nystagmus.  There is no facial motor or sensory deficit.  Hearing is intact

bilaterally.  The palate is elevated symmetrically.  Sternocleidomastoid muscles

are powerful bilaterally.  The patient shrugs his shoulders symmetrically,

protrudes his tongue without fasciculation or atrophy.

3.  MOTOR EXAMINATION:  No focal muscle bulk was seen.  The tone is normal.  The

strength is 4/5 throughout.

4.  SENSORY EXAMINATION:  Revealed normal pinprick, light touch, vibratory and

position senses.  Deep tendon reflexes were asymmetric and hypoactive with

absent Achilles responses.

5.  GAIT:  The stance is steady.  The patient walked without assistance.  During

the walking, the patient has a resting tremor of the hands, but he swings his

arms symmetrically.



LABORATORY DATA:  On 11/21/2019 revealed white blood cells of 4.7, hemoglobin

12.7, hematocrit 38.2, platelet count 176,000.  Chemistry revealed sodium 144,

potassium 4.4, chloride 108, CO2 of 29, BUN 23, creatinine 0.8, glucose 95,

calcium 8.7, magnesium 2.  Liver enzymes are normal.  TIBC is low.  A1c is 5.3. 

Lipid profile revealed normal range.  Vitamin D is 45.  TSH, T4 and T3 are

within normal range.  Urinalysis is negative for urinary tract infections. 

Treponema pallidum antibodies nonreactive.



IMPRESSION:

1.  Marked Parkinson disease, presented with resting tremor with mild increased

rigidity in the lower extremities without significant gait disturbances.

2.  Slowly progressive dementia, probably Alzheimer type versus Lewy body

dementia.

3.  Multiple medical problems, include history of seizure disorders,

hypertension, obstructive sleep apnea, hypothyroidism, hyperlipidemia,

gastroesophageal reflux disease.

4.  Multiple psychiatric problems including delusional disorders and possible

major depression disorders and intermittent psychosis.



RECOMMENDATIONS:  Continue with current medications for Parkinson disease

including carbidopa/levodopa ER 25/100 mg at bedtime and carbidopa/levodopa

25/100 immediate release to be taken 2 tablets 5 times within 24 hours while

awake and Comtan and other home medications.





______________________________

M JESSE TOMLINSON MD



DR:  KELLEN/georgina  JOB#:  [id number] / [id number]

DD:  12/06/2019 00:00  DT:  12/06/2019 03:49

## 2019-12-07 VITALS — SYSTOLIC BLOOD PRESSURE: 157 MMHG | DIASTOLIC BLOOD PRESSURE: 92 MMHG

## 2019-12-07 VITALS — SYSTOLIC BLOOD PRESSURE: 139 MMHG | DIASTOLIC BLOOD PRESSURE: 88 MMHG

## 2019-12-07 RX ADMIN — QUETIAPINE FUMARATE SCH MG: 25 TABLET, FILM COATED ORAL at 15:21

## 2019-12-07 RX ADMIN — POLYETHYLENE GLYCOL 3350 SCH GM: 17 POWDER, FOR SOLUTION ORAL at 20:24

## 2019-12-07 RX ADMIN — CARBIDOPA AND LEVODOPA SCH TAB: 25; 100 TABLET ORAL at 18:20

## 2019-12-07 RX ADMIN — CARBIDOPA AND LEVODOPA SCH TAB: 25; 100 TABLET ORAL at 06:04

## 2019-12-07 RX ADMIN — MEMANTINE HYDROCHLORIDE SCH MG: 10 TABLET ORAL at 09:12

## 2019-12-07 RX ADMIN — QUETIAPINE FUMARATE SCH MG: 100 TABLET, FILM COATED ORAL at 20:22

## 2019-12-07 RX ADMIN — MELOXICAM SCH MG: 7.5 TABLET ORAL at 09:12

## 2019-12-07 RX ADMIN — Medication SCH MG: at 09:11

## 2019-12-07 RX ADMIN — TRAZODONE HYDROCHLORIDE SCH MG: 100 TABLET ORAL at 20:24

## 2019-12-07 RX ADMIN — ENTACAPONE SCH MG: 200 TABLET, FILM COATED ORAL at 20:26

## 2019-12-07 RX ADMIN — CARBIDOPA AND LEVODOPA SCH TAB.SA: 25; 100 TABLET, EXTENDED RELEASE ORAL at 20:23

## 2019-12-07 RX ADMIN — CARBIDOPA AND LEVODOPA SCH TAB: 25; 100 TABLET ORAL at 15:21

## 2019-12-07 RX ADMIN — METOPROLOL SUCCINATE SCH MG: 50 TABLET, EXTENDED RELEASE ORAL at 09:00

## 2019-12-07 RX ADMIN — ENTACAPONE SCH MG: 200 TABLET, FILM COATED ORAL at 09:12

## 2019-12-07 RX ADMIN — DIVALPROEX SODIUM SCH MG: 500 TABLET, FILM COATED, EXTENDED RELEASE ORAL at 20:23

## 2019-12-07 RX ADMIN — FINASTERIDE SCH MG: 5 TABLET, FILM COATED ORAL at 09:12

## 2019-12-07 RX ADMIN — CITALOPRAM HYDROBROMIDE SCH MG: 20 TABLET ORAL at 09:12

## 2019-12-07 RX ADMIN — CARBIDOPA AND LEVODOPA SCH TAB: 25; 100 TABLET ORAL at 20:27

## 2019-12-07 RX ADMIN — Medication SCH MCG: at 09:00

## 2019-12-07 RX ADMIN — MEMANTINE HYDROCHLORIDE SCH MG: 10 TABLET ORAL at 20:22

## 2019-12-07 RX ADMIN — RIVASTIGMINE SCH PATCH: 9.5 PATCH, EXTENDED RELEASE TRANSDERMAL at 09:13

## 2019-12-07 RX ADMIN — CARBIDOPA AND LEVODOPA SCH TAB: 25; 100 TABLET ORAL at 09:13

## 2019-12-07 RX ADMIN — POLYETHYLENE GLYCOL 3350 SCH GM: 17 POWDER, FOR SOLUTION ORAL at 09:12

## 2019-12-07 RX ADMIN — MIRTAZAPINE SCH MG: 15 TABLET, ORALLY DISINTEGRATING ORAL at 20:24

## 2019-12-07 RX ADMIN — MELATONIN TAB 3 MG SCH MG: 3 TAB at 20:22

## 2019-12-07 RX ADMIN — QUETIAPINE FUMARATE SCH MG: 25 TABLET, FILM COATED ORAL at 10:27

## 2019-12-07 NOTE — PN
DATE:  12/06/2019



SUBJECTIVE:  The patient was seen today, met with the staff, chart reviewed. 

The patient is still confused, irritable, hughes, verbally abusive at times.  The

patient also complains of decreased appetite.  The patient apparently not

exhibited any major behavior problems today.



OBSERVATION:

VITAL SIGNS:  Temperature 97.6, blood pressure 128/82, pulse 61, respirations

14, O2 sat 98%.

GENERAL:  Slept about 7 hours last night.



LABORATORY DATA:  The patient's lab reviewed.



MEDICATIONS:  The patient's current medications included Depakote 100 mg at

night, Exelon patch daily, Namenda 10 mg b.i.d., mirtazapine 7.5 mg at night,

lorazepam 1 mg b.i.d., Seroquel 25 mg b.i.d., Celexa 25 mg daily, also Seroquel

100 mg at night.  The patient also on trazodone 100 mg at bedtime p.r.n.  The

patient is also getting melatonin 6 mg at night for sleep.



ASSESSMENT:

1.  Lewy body dementia with behavior problems.

2.  Anxiety disorder, unspecified.

3.  Impulse control disorder, unspecified.



PLAN:  To continue with the treatment.



LENGTH OF STAY:  7 days.





______________________________

CAITLYN GUEVARA MD



DR:  CULLEN/georgina  JOB#:  [id number] / [id number]

DD:  12/06/2019 15:37  DT:  12/07/2019 00:50

## 2019-12-07 NOTE — PN
DATE:  12/07/2019



SUBJECTIVE:  The patient was seen today, met with the staff, chart reviewed. 

The patient continues to have problems with his behaviors, constantly pacing,

unsteady, also is a fall risk.  The patient is also verbally abusive towards

staff.  The patient is also not able to follow directions.  The patient also had

decreased appetite.

  

OBSERVATION:

VITAL SIGNS:  Temperature 97.9, blood pressure 139/88, pulse 76, respirations

20, O2 sat 96%.  The patient's lab reviewed.  The patient's appetite decreased.



MEDICATIONS:  The patient's current medications include Depakote 100 mg at

night, Exelon patch daily, Namenda 10 mg b.i.d., mirtazapine 7.5 mg at night,

lorazepam 1 mg b.i.d., Seroquel 25 mg b.i.d., Celexa 25 mg daily and Seroquel

100 mg at night.  The patient is also on melatonin 6 mg at night for sleep.



ASSESSMENT:

1.  Lewy body dementia with behavior problems.

2.  Anxiety disorder, unspecified.

3.  Impulse control disorder, unspecified.



PLAN:  To continue with the treatment.



LENGTH OF STAY:  7 days.





______________________________

CAITLYN GUEVARA MD DR:  CULLEN/georgina  JOB#:  [id number] / [id number]

DD:  12/07/2019 17:03  DT:  12/07/2019 22:42

## 2019-12-08 VITALS — SYSTOLIC BLOOD PRESSURE: 170 MMHG | DIASTOLIC BLOOD PRESSURE: 102 MMHG

## 2019-12-08 VITALS — SYSTOLIC BLOOD PRESSURE: 138 MMHG | DIASTOLIC BLOOD PRESSURE: 81 MMHG

## 2019-12-08 RX ADMIN — TRAZODONE HYDROCHLORIDE SCH MG: 100 TABLET ORAL at 19:07

## 2019-12-08 RX ADMIN — CITALOPRAM HYDROBROMIDE SCH MG: 20 TABLET ORAL at 08:56

## 2019-12-08 RX ADMIN — Medication SCH MG: at 09:00

## 2019-12-08 RX ADMIN — MIRTAZAPINE SCH MG: 15 TABLET, ORALLY DISINTEGRATING ORAL at 19:08

## 2019-12-08 RX ADMIN — QUETIAPINE FUMARATE SCH MG: 100 TABLET, FILM COATED ORAL at 19:08

## 2019-12-08 RX ADMIN — RIVASTIGMINE SCH PATCH: 9.5 PATCH, EXTENDED RELEASE TRANSDERMAL at 08:58

## 2019-12-08 RX ADMIN — MEMANTINE HYDROCHLORIDE SCH MG: 10 TABLET ORAL at 19:08

## 2019-12-08 RX ADMIN — MEMANTINE HYDROCHLORIDE SCH MG: 10 TABLET ORAL at 08:57

## 2019-12-08 RX ADMIN — QUETIAPINE FUMARATE SCH MG: 25 TABLET, FILM COATED ORAL at 08:57

## 2019-12-08 RX ADMIN — CARBIDOPA AND LEVODOPA SCH TAB.SA: 25; 100 TABLET, EXTENDED RELEASE ORAL at 19:07

## 2019-12-08 RX ADMIN — CARBIDOPA AND LEVODOPA SCH TAB: 25; 100 TABLET ORAL at 08:58

## 2019-12-08 RX ADMIN — QUETIAPINE FUMARATE SCH MG: 25 TABLET, FILM COATED ORAL at 15:27

## 2019-12-08 RX ADMIN — POLYETHYLENE GLYCOL 3350 SCH GM: 17 POWDER, FOR SOLUTION ORAL at 08:56

## 2019-12-08 RX ADMIN — CARBIDOPA AND LEVODOPA SCH TAB: 25; 100 TABLET ORAL at 18:21

## 2019-12-08 RX ADMIN — MELOXICAM SCH MG: 7.5 TABLET ORAL at 08:57

## 2019-12-08 RX ADMIN — ENTACAPONE SCH MG: 200 TABLET, FILM COATED ORAL at 20:12

## 2019-12-08 RX ADMIN — FINASTERIDE SCH MG: 5 TABLET, FILM COATED ORAL at 08:57

## 2019-12-08 RX ADMIN — CARBIDOPA AND LEVODOPA SCH TAB: 25; 100 TABLET ORAL at 06:12

## 2019-12-08 RX ADMIN — MELATONIN TAB 3 MG SCH MG: 3 TAB at 19:07

## 2019-12-08 RX ADMIN — DIVALPROEX SODIUM SCH MG: 500 TABLET, FILM COATED, EXTENDED RELEASE ORAL at 19:07

## 2019-12-08 RX ADMIN — ENTACAPONE SCH MG: 200 TABLET, FILM COATED ORAL at 08:56

## 2019-12-08 RX ADMIN — METOPROLOL SUCCINATE SCH MG: 50 TABLET, EXTENDED RELEASE ORAL at 08:58

## 2019-12-08 RX ADMIN — CARBIDOPA AND LEVODOPA SCH TAB: 25; 100 TABLET ORAL at 20:13

## 2019-12-08 RX ADMIN — POLYETHYLENE GLYCOL 3350 SCH GM: 17 POWDER, FOR SOLUTION ORAL at 19:08

## 2019-12-08 RX ADMIN — Medication SCH MCG: at 08:58

## 2019-12-08 RX ADMIN — CARBIDOPA AND LEVODOPA SCH TAB: 25; 100 TABLET ORAL at 15:27

## 2019-12-08 NOTE — PN
DATE:  12/08/2019



SUBJECTIVE:  The patient was seen today.  I met with the staff, chart reviewed. 

The patient also having problems with behaviors, constantly pacing, unsteady and

a fall risk.  The patient also verbally abusive towards staff.  He is not able

to follow directions.  The patient is not sleeping well, decreased appetite.



OBSERVATIONS:

VITAL SIGNS:  Temperature 97.9, blood pressure 139/88, pulse 76, respirations

20, O2 sat 96%.



MEDICATIONS:  The patient's current medications include Depakote 100 mg at

night, Exelon patch daily, Namenda 10 mg b.i.d., mirtazapine 7.5 mg at night,

lorazepam 1 mg b.i.d., Seroquel 25 mg b.i.d., Celexa 20 mg daily, Seroquel 100

mg at night.  The patient also on melatonin 6 mg at night for sleep.



ASSESSMENT:

1.  Lewy body dementia with behavior problems.

2.  Anxiety disorder, unspecified.

3.  Impulse control disorder, unspecified.



PLAN:  To continue with the treatment.



LENGTH OF STAY:  7 days.





______________________________

CAITLYN GUEVARA MD



DR:  CULLEN/georgina  JOB#:  [id number] / [id number]

DD:  12/08/2019 16:15  DT:  12/08/2019 23:21

## 2019-12-09 VITALS — SYSTOLIC BLOOD PRESSURE: 148 MMHG | DIASTOLIC BLOOD PRESSURE: 89 MMHG

## 2019-12-09 VITALS — SYSTOLIC BLOOD PRESSURE: 134 MMHG | DIASTOLIC BLOOD PRESSURE: 91 MMHG

## 2019-12-09 RX ADMIN — MEMANTINE HYDROCHLORIDE SCH MG: 10 TABLET ORAL at 19:34

## 2019-12-09 RX ADMIN — POLYETHYLENE GLYCOL 3350 SCH GM: 17 POWDER, FOR SOLUTION ORAL at 19:33

## 2019-12-09 RX ADMIN — MEMANTINE HYDROCHLORIDE SCH MG: 10 TABLET ORAL at 09:00

## 2019-12-09 RX ADMIN — QUETIAPINE FUMARATE SCH MG: 25 TABLET, FILM COATED ORAL at 12:13

## 2019-12-09 RX ADMIN — CARBIDOPA AND LEVODOPA SCH TAB: 25; 100 TABLET ORAL at 12:13

## 2019-12-09 RX ADMIN — POLYETHYLENE GLYCOL 3350 SCH GM: 17 POWDER, FOR SOLUTION ORAL at 09:00

## 2019-12-09 RX ADMIN — CARBIDOPA AND LEVODOPA SCH TAB: 25; 100 TABLET ORAL at 19:35

## 2019-12-09 RX ADMIN — Medication SCH MG: at 07:37

## 2019-12-09 RX ADMIN — DIVALPROEX SODIUM SCH MG: 500 TABLET, FILM COATED, EXTENDED RELEASE ORAL at 19:35

## 2019-12-09 RX ADMIN — CARBIDOPA AND LEVODOPA SCH TAB: 25; 100 TABLET ORAL at 09:58

## 2019-12-09 RX ADMIN — ENTACAPONE SCH MG: 200 TABLET, FILM COATED ORAL at 09:00

## 2019-12-09 RX ADMIN — METOPROLOL SUCCINATE SCH MG: 50 TABLET, EXTENDED RELEASE ORAL at 09:00

## 2019-12-09 RX ADMIN — QUETIAPINE FUMARATE SCH MG: 100 TABLET, FILM COATED ORAL at 19:35

## 2019-12-09 RX ADMIN — Medication SCH MCG: at 09:00

## 2019-12-09 RX ADMIN — QUETIAPINE FUMARATE SCH MG: 25 TABLET, FILM COATED ORAL at 09:00

## 2019-12-09 RX ADMIN — FINASTERIDE SCH MG: 5 TABLET, FILM COATED ORAL at 09:00

## 2019-12-09 RX ADMIN — TRAZODONE HYDROCHLORIDE SCH MG: 100 TABLET ORAL at 19:33

## 2019-12-09 RX ADMIN — CARBIDOPA AND LEVODOPA SCH TAB: 25; 100 TABLET ORAL at 05:59

## 2019-12-09 RX ADMIN — RIVASTIGMINE SCH PATCH: 9.5 PATCH, EXTENDED RELEASE TRANSDERMAL at 09:00

## 2019-12-09 RX ADMIN — CITALOPRAM HYDROBROMIDE SCH MG: 20 TABLET ORAL at 09:00

## 2019-12-09 RX ADMIN — CARBIDOPA AND LEVODOPA SCH TAB.SA: 25; 100 TABLET, EXTENDED RELEASE ORAL at 19:35

## 2019-12-09 RX ADMIN — MELOXICAM SCH MG: 7.5 TABLET ORAL at 09:00

## 2019-12-09 RX ADMIN — ENTACAPONE SCH MG: 200 TABLET, FILM COATED ORAL at 19:34

## 2019-12-09 RX ADMIN — MIRTAZAPINE SCH MG: 15 TABLET, ORALLY DISINTEGRATING ORAL at 19:33

## 2019-12-09 RX ADMIN — MELATONIN TAB 3 MG SCH MG: 3 TAB at 19:34

## 2019-12-09 RX ADMIN — CARBIDOPA AND LEVODOPA SCH TAB: 25; 100 TABLET ORAL at 17:10

## 2019-12-10 VITALS — SYSTOLIC BLOOD PRESSURE: 129 MMHG | DIASTOLIC BLOOD PRESSURE: 76 MMHG

## 2019-12-10 VITALS — SYSTOLIC BLOOD PRESSURE: 125 MMHG | DIASTOLIC BLOOD PRESSURE: 85 MMHG

## 2019-12-10 VITALS — DIASTOLIC BLOOD PRESSURE: 87 MMHG | SYSTOLIC BLOOD PRESSURE: 151 MMHG

## 2019-12-10 LAB
ALBUMIN SERPL-MCNC: 3.5 G/DL (ref 3.4–5)
ALBUMIN/GLOB SERPL: 1.1 {RATIO} (ref 1–1.7)
ALP SERPL-CCNC: 96 U/L (ref 46–116)
ALT SERPL-CCNC: 24 U/L (ref 16–63)
ANION GAP SERPL CALC-SCNC: 8 MMOL/L (ref 6–14)
AST SERPL-CCNC: 62 U/L (ref 15–37)
BASOPHILS # BLD AUTO: 0.1 X10^3/UL (ref 0–0.2)
BASOPHILS NFR BLD: 1 % (ref 0–3)
BILIRUB SERPL-MCNC: 0.5 MG/DL (ref 0.2–1)
BUN/CREAT SERPL: 30 (ref 6–20)
CA-I SERPL ISE-MCNC: 30 MG/DL (ref 8–26)
CALCIUM SERPL-MCNC: 8.7 MG/DL (ref 8.5–10.1)
CHLORIDE SERPL-SCNC: 107 MMOL/L (ref 98–107)
CO2 SERPL-SCNC: 30 MMOL/L (ref 21–32)
CREAT SERPL-MCNC: 1 MG/DL (ref 0.7–1.3)
EOSINOPHIL NFR BLD: 0.6 X10^3/UL (ref 0–0.7)
EOSINOPHIL NFR BLD: 14 % (ref 0–3)
ERYTHROCYTE [DISTWIDTH] IN BLOOD BY AUTOMATED COUNT: 13 % (ref 11.5–14.5)
GFR SERPLBLD BASED ON 1.73 SQ M-ARVRAT: 74.5 ML/MIN
GLOBULIN SER-MCNC: 3.3 G/DL (ref 2.2–3.8)
GLUCOSE SERPL-MCNC: 84 MG/DL (ref 70–99)
HCT VFR BLD CALC: 44.2 % (ref 39–53)
HGB BLD-MCNC: 14.7 G/DL (ref 13–17.5)
LYMPHOCYTES # BLD: 0.9 X10^3/UL (ref 1–4.8)
LYMPHOCYTES NFR BLD AUTO: 19 % (ref 24–48)
MCH RBC QN AUTO: 30 PG (ref 25–35)
MCHC RBC AUTO-ENTMCNC: 33 G/DL (ref 31–37)
MCV RBC AUTO: 88 FL (ref 79–100)
MONO #: 0.5 X10^3/UL (ref 0–1.1)
MONOCYTES NFR BLD: 11 % (ref 0–9)
NEUT #: 2.5 X10^3UL (ref 1.8–7.7)
NEUTROPHILS NFR BLD AUTO: 55 % (ref 31–73)
PLATELET # BLD AUTO: 222 X10^3/UL (ref 140–400)
POTASSIUM SERPL-SCNC: 4.2 MMOL/L (ref 3.5–5.1)
PROT SERPL-MCNC: 6.8 G/DL (ref 6.4–8.2)
RBC # BLD AUTO: 5 X10^6/UL (ref 4.3–5.7)
SODIUM SERPL-SCNC: 145 MMOL/L (ref 136–145)
WBC # BLD AUTO: 4.6 X10^3/UL (ref 4–11)

## 2019-12-10 RX ADMIN — QUETIAPINE FUMARATE SCH MG: 25 TABLET, FILM COATED ORAL at 14:16

## 2019-12-10 RX ADMIN — QUETIAPINE FUMARATE SCH MG: 100 TABLET, FILM COATED ORAL at 19:31

## 2019-12-10 RX ADMIN — CARBIDOPA AND LEVODOPA SCH TAB: 25; 100 TABLET ORAL at 05:26

## 2019-12-10 RX ADMIN — MELATONIN TAB 3 MG SCH MG: 3 TAB at 19:32

## 2019-12-10 RX ADMIN — TRAZODONE HYDROCHLORIDE SCH MG: 100 TABLET ORAL at 19:32

## 2019-12-10 RX ADMIN — MEMANTINE HYDROCHLORIDE SCH MG: 10 TABLET ORAL at 19:32

## 2019-12-10 RX ADMIN — CARBIDOPA AND LEVODOPA SCH TAB: 25; 100 TABLET ORAL at 11:04

## 2019-12-10 RX ADMIN — POLYETHYLENE GLYCOL 3350 SCH GM: 17 POWDER, FOR SOLUTION ORAL at 09:20

## 2019-12-10 RX ADMIN — MEMANTINE HYDROCHLORIDE SCH MG: 10 TABLET ORAL at 09:20

## 2019-12-10 RX ADMIN — POLYETHYLENE GLYCOL 3350 SCH GM: 17 POWDER, FOR SOLUTION ORAL at 19:31

## 2019-12-10 RX ADMIN — DIVALPROEX SODIUM SCH MG: 500 TABLET, FILM COATED, EXTENDED RELEASE ORAL at 19:32

## 2019-12-10 RX ADMIN — CARBIDOPA AND LEVODOPA SCH TAB: 25; 100 TABLET ORAL at 17:28

## 2019-12-10 RX ADMIN — CARBIDOPA AND LEVODOPA SCH TAB: 25; 100 TABLET ORAL at 14:17

## 2019-12-10 RX ADMIN — CARBIDOPA AND LEVODOPA SCH TAB.SA: 25; 100 TABLET, EXTENDED RELEASE ORAL at 19:31

## 2019-12-10 RX ADMIN — CARBIDOPA AND LEVODOPA SCH TAB: 25; 100 TABLET ORAL at 22:00

## 2019-12-10 RX ADMIN — Medication SCH MG: at 09:21

## 2019-12-10 RX ADMIN — FINASTERIDE SCH MG: 5 TABLET, FILM COATED ORAL at 09:20

## 2019-12-10 RX ADMIN — RIVASTIGMINE SCH PATCH: 9.5 PATCH, EXTENDED RELEASE TRANSDERMAL at 09:23

## 2019-12-10 RX ADMIN — METOPROLOL SUCCINATE SCH MG: 50 TABLET, EXTENDED RELEASE ORAL at 09:19

## 2019-12-10 RX ADMIN — MIRTAZAPINE SCH MG: 15 TABLET, ORALLY DISINTEGRATING ORAL at 19:32

## 2019-12-10 RX ADMIN — CITALOPRAM HYDROBROMIDE SCH MG: 20 TABLET ORAL at 09:20

## 2019-12-10 RX ADMIN — MELOXICAM SCH MG: 7.5 TABLET ORAL at 09:20

## 2019-12-10 RX ADMIN — ENTACAPONE SCH MG: 200 TABLET, FILM COATED ORAL at 19:32

## 2019-12-10 RX ADMIN — ENTACAPONE SCH MG: 200 TABLET, FILM COATED ORAL at 09:20

## 2019-12-10 RX ADMIN — QUETIAPINE FUMARATE SCH MG: 25 TABLET, FILM COATED ORAL at 09:20

## 2019-12-10 RX ADMIN — Medication SCH MCG: at 09:20

## 2019-12-10 NOTE — PN
DATE:  12/09/2019



SUBJECTIVE:  The patient was seen today, met with the staff, chart reviewed. 

The patient's behavior remains the same; it tends to pace constantly, increased

psychomotor activity, purposeless movements.  Poor eye contact and does not

interact with the staff or residents.  The patient also not able to hold a

reasonable conversation.  The patient's behavior at times unpredictable,

recently has not shown any combative behavior, but the patient is constantly

pacing and difficult to redirect.



OBSERVATION:

VITAL SIGNS:  Temperature 97.6, blood pressure 134/91, pulse 75, respiration 18,

O2 sat 99%.  Slept about 7-1/2 hours last night.  The patient's appetite is

fair.



MEDICATIONS:  The patient's current medications include Depakote 100 mg at

night, Exelon patch, Namenda 10 mg b.i.d., mirtazapine 7.5 mg at night,

lorazepam 1 mg b.i.d., and Seroquel 25 mg b.i.d. and Celexa 20 mg daily and

Seroquel 100 mg at night.  The patient is also on melatonin 6 mg at night for

sleep.



ASSESSMENT:

1.  Lewy body dementia with behavior problems.

2.  Anxiety disorder, unspecified.

3.  Impulse control disorder, unspecified.



PLAN:  To continue with the treatment.



LENGTH OF STAY:  7 days.





______________________________

CAITLYN GUEVARA MD



DR:  Jerson  JOB#:  [id number] / [id number]

DD:  12/09/2019 16:46  DT:  12/10/2019 01:35

## 2019-12-11 VITALS — SYSTOLIC BLOOD PRESSURE: 166 MMHG | DIASTOLIC BLOOD PRESSURE: 98 MMHG

## 2019-12-11 VITALS — DIASTOLIC BLOOD PRESSURE: 85 MMHG | SYSTOLIC BLOOD PRESSURE: 152 MMHG

## 2019-12-11 VITALS — DIASTOLIC BLOOD PRESSURE: 67 MMHG | SYSTOLIC BLOOD PRESSURE: 98 MMHG

## 2019-12-11 VITALS — SYSTOLIC BLOOD PRESSURE: 94 MMHG | DIASTOLIC BLOOD PRESSURE: 60 MMHG

## 2019-12-11 RX ADMIN — MEMANTINE HYDROCHLORIDE SCH MG: 10 TABLET ORAL at 08:36

## 2019-12-11 RX ADMIN — MEMANTINE HYDROCHLORIDE SCH MG: 10 TABLET ORAL at 21:40

## 2019-12-11 RX ADMIN — CARBIDOPA AND LEVODOPA SCH TAB.SA: 25; 100 TABLET, EXTENDED RELEASE ORAL at 21:40

## 2019-12-11 RX ADMIN — QUETIAPINE FUMARATE SCH MG: 50 TABLET, FILM COATED ORAL at 08:36

## 2019-12-11 RX ADMIN — CARBIDOPA AND LEVODOPA SCH TAB: 25; 100 TABLET ORAL at 21:41

## 2019-12-11 RX ADMIN — CARBIDOPA AND LEVODOPA SCH TAB: 25; 100 TABLET ORAL at 17:17

## 2019-12-11 RX ADMIN — QUETIAPINE FUMARATE SCH MG: 100 TABLET, FILM COATED ORAL at 21:40

## 2019-12-11 RX ADMIN — ENTACAPONE SCH MG: 200 TABLET, FILM COATED ORAL at 08:31

## 2019-12-11 RX ADMIN — CARBIDOPA AND LEVODOPA SCH TAB: 25; 100 TABLET ORAL at 05:56

## 2019-12-11 RX ADMIN — Medication SCH MG: at 08:31

## 2019-12-11 RX ADMIN — POLYETHYLENE GLYCOL 3350 SCH GM: 17 POWDER, FOR SOLUTION ORAL at 08:34

## 2019-12-11 RX ADMIN — METOPROLOL SUCCINATE SCH MG: 50 TABLET, EXTENDED RELEASE ORAL at 08:37

## 2019-12-11 RX ADMIN — FINASTERIDE SCH MG: 5 TABLET, FILM COATED ORAL at 08:36

## 2019-12-11 RX ADMIN — MELOXICAM SCH MG: 7.5 TABLET ORAL at 08:36

## 2019-12-11 RX ADMIN — TRAZODONE HYDROCHLORIDE SCH MG: 100 TABLET ORAL at 21:40

## 2019-12-11 RX ADMIN — QUETIAPINE FUMARATE SCH MG: 50 TABLET, FILM COATED ORAL at 13:57

## 2019-12-11 RX ADMIN — Medication SCH MCG: at 08:36

## 2019-12-11 RX ADMIN — DIVALPROEX SODIUM SCH MG: 500 TABLET, FILM COATED, EXTENDED RELEASE ORAL at 21:40

## 2019-12-11 RX ADMIN — CARBIDOPA AND LEVODOPA SCH TAB: 25; 100 TABLET ORAL at 10:12

## 2019-12-11 RX ADMIN — RIVASTIGMINE SCH PATCH: 9.5 PATCH, EXTENDED RELEASE TRANSDERMAL at 08:35

## 2019-12-11 RX ADMIN — CARBIDOPA AND LEVODOPA SCH TAB: 25; 100 TABLET ORAL at 13:56

## 2019-12-11 RX ADMIN — MIRTAZAPINE SCH MG: 15 TABLET, ORALLY DISINTEGRATING ORAL at 21:40

## 2019-12-11 RX ADMIN — POLYETHYLENE GLYCOL 3350 SCH GM: 17 POWDER, FOR SOLUTION ORAL at 21:00

## 2019-12-11 RX ADMIN — CITALOPRAM HYDROBROMIDE SCH MG: 20 TABLET ORAL at 08:36

## 2019-12-11 RX ADMIN — MELATONIN TAB 3 MG SCH MG: 3 TAB at 21:40

## 2019-12-11 RX ADMIN — ENTACAPONE SCH MG: 200 TABLET, FILM COATED ORAL at 21:40

## 2019-12-11 RX ADMIN — CARBIDOPA AND LEVODOPA SCH TAB: 25; 100 TABLET ORAL at 11:30

## 2019-12-11 NOTE — PN
DATE:  12/10/2019



SUBJECTIVE:  The patient was seen today, met with the staff, chart reviewed. 

The patient's behavior remains the same.  He is constantly pacing, increased

psychomotor activity, constantly trying to open the doors, intrusive,

indifferent to surroundings and also threatening towards staff at times.



OBSERVATION:

VITAL SIGNS:  Temperature 97.7, blood pressure 125/85, pulse 87, respirations

20, O2 sat 95%.  Slept about 8 hours last night.



LABORATORY DATA:  The patient's lab reviewed.



MEDICATIONS:  The patient's current medications include Depakote 1000 mg at

night, Seroquel 50 mg b.i.d., Exelon patch daily, Namenda 10 mg b.i.d.,

mirtazapine 7.5 mg at night, lorazepam 1 mg b.i.d., trazodone 100 mg at night

and p.r.n. and citalopram 20 mg daily, Seroquel 100 mg at night, melatonin 6 mg

at night.  The patient is not having any side effects to medications.



ASSESSMENT:

1.  Lewy body dementia with behavior problems.

2.  Anxiety disorder, unspecified.

3.  Impulse control disorder, unspecified.



PLAN:  To continue with the treatment.



LENGTH OF STAY:  6-7 days.





______________________________

CAITLYN GUEVARA MD



DR:  CULLEN/georgina  JOB#:  [id number] / [id number]

DD:  12/10/2019 18:29  DT:  12/11/2019 01:17

## 2019-12-12 VITALS — SYSTOLIC BLOOD PRESSURE: 106 MMHG | DIASTOLIC BLOOD PRESSURE: 66 MMHG

## 2019-12-12 VITALS — DIASTOLIC BLOOD PRESSURE: 84 MMHG | SYSTOLIC BLOOD PRESSURE: 101 MMHG

## 2019-12-12 VITALS — SYSTOLIC BLOOD PRESSURE: 130 MMHG | DIASTOLIC BLOOD PRESSURE: 80 MMHG

## 2019-12-12 RX ADMIN — CARBIDOPA AND LEVODOPA SCH TAB: 25; 100 TABLET ORAL at 20:41

## 2019-12-12 RX ADMIN — ENTACAPONE SCH MG: 200 TABLET, FILM COATED ORAL at 20:40

## 2019-12-12 RX ADMIN — ENTACAPONE SCH MG: 200 TABLET, FILM COATED ORAL at 07:38

## 2019-12-12 RX ADMIN — CITALOPRAM HYDROBROMIDE SCH MG: 20 TABLET ORAL at 07:39

## 2019-12-12 RX ADMIN — QUETIAPINE FUMARATE SCH MG: 100 TABLET, FILM COATED ORAL at 20:41

## 2019-12-12 RX ADMIN — METOPROLOL SUCCINATE SCH MG: 50 TABLET, EXTENDED RELEASE ORAL at 07:38

## 2019-12-12 RX ADMIN — TRAZODONE HYDROCHLORIDE SCH MG: 100 TABLET ORAL at 20:41

## 2019-12-12 RX ADMIN — CARBIDOPA AND LEVODOPA SCH TAB: 25; 100 TABLET ORAL at 11:20

## 2019-12-12 RX ADMIN — MELOXICAM SCH MG: 7.5 TABLET ORAL at 07:38

## 2019-12-12 RX ADMIN — QUETIAPINE FUMARATE SCH MG: 50 TABLET, FILM COATED ORAL at 07:39

## 2019-12-12 RX ADMIN — Medication SCH MCG: at 07:38

## 2019-12-12 RX ADMIN — CARBIDOPA AND LEVODOPA SCH TAB: 25; 100 TABLET ORAL at 17:35

## 2019-12-12 RX ADMIN — POLYETHYLENE GLYCOL 3350 SCH GM: 17 POWDER, FOR SOLUTION ORAL at 20:40

## 2019-12-12 RX ADMIN — POLYETHYLENE GLYCOL 3350 SCH GM: 17 POWDER, FOR SOLUTION ORAL at 07:37

## 2019-12-12 RX ADMIN — FINASTERIDE SCH MG: 5 TABLET, FILM COATED ORAL at 07:39

## 2019-12-12 RX ADMIN — MEMANTINE HYDROCHLORIDE SCH MG: 10 TABLET ORAL at 07:37

## 2019-12-12 RX ADMIN — MEMANTINE HYDROCHLORIDE SCH MG: 10 TABLET ORAL at 20:42

## 2019-12-12 RX ADMIN — QUETIAPINE FUMARATE SCH MG: 50 TABLET, FILM COATED ORAL at 14:20

## 2019-12-12 RX ADMIN — DIVALPROEX SODIUM SCH MG: 500 TABLET, FILM COATED, EXTENDED RELEASE ORAL at 20:41

## 2019-12-12 RX ADMIN — MIRTAZAPINE SCH MG: 15 TABLET, ORALLY DISINTEGRATING ORAL at 20:40

## 2019-12-12 RX ADMIN — MELATONIN TAB 3 MG SCH MG: 3 TAB at 20:42

## 2019-12-12 RX ADMIN — RIVASTIGMINE SCH PATCH: 9.5 PATCH, EXTENDED RELEASE TRANSDERMAL at 07:38

## 2019-12-12 RX ADMIN — Medication SCH MG: at 07:37

## 2019-12-12 RX ADMIN — CHOLECALCIFEROL CAP 1.25 MG (50000 UNIT) SCH UNIT: 1.25 CAP at 07:37

## 2019-12-12 RX ADMIN — CARBIDOPA AND LEVODOPA SCH TAB: 25; 100 TABLET ORAL at 05:35

## 2019-12-12 RX ADMIN — CARBIDOPA AND LEVODOPA SCH TAB.SA: 25; 100 TABLET, EXTENDED RELEASE ORAL at 20:40

## 2019-12-12 RX ADMIN — CARBIDOPA AND LEVODOPA SCH TAB: 25; 100 TABLET ORAL at 14:20

## 2019-12-12 NOTE — PN
DATE:  12/11/2019



SUBJECTIVE:  The patient was seen today, met with the staff, chart reviewed. 

The patient's behavior remains the same, constantly pacing, intrusive,

noncommunicative, also exit-seeking behaviors.



OBSERVATION:

VITAL SIGNS:  Temperature 97.6, blood pressure 166/98, pulse 52, respirations

18, O2 sat 96%.  Slept about 8 hours last night.



LABORATORY DATA:  The patient's lab reviewed.



The patient is not having any physical complaints.  The patient has unsteady

gait, continues to pace and walk, but no falls.  The patient is difficult to

redirect.



ASSESSMENT:

1.  Lewy body dementia with behavior problems.

2.  Anxiety disorder, unspecified.

3.  Impulse control disorder, unspecified.



PLAN:  To continue with the treatment.



LENGTH OF STAY:  5-7 days.





______________________________

CAITLYN GUEVARA MD



DR:  CULLEN/georgina  JOB#:  [id number] / [id number]

DD:  12/11/2019 12:04  DT:  12/12/2019 00:10

## 2019-12-13 VITALS — DIASTOLIC BLOOD PRESSURE: 68 MMHG | SYSTOLIC BLOOD PRESSURE: 110 MMHG

## 2019-12-13 VITALS — SYSTOLIC BLOOD PRESSURE: 116 MMHG | DIASTOLIC BLOOD PRESSURE: 74 MMHG

## 2019-12-13 RX ADMIN — CARBIDOPA AND LEVODOPA SCH TAB: 25; 100 TABLET ORAL at 21:28

## 2019-12-13 RX ADMIN — ENTACAPONE SCH MG: 200 TABLET, FILM COATED ORAL at 11:53

## 2019-12-13 RX ADMIN — POLYETHYLENE GLYCOL 3350 SCH GM: 17 POWDER, FOR SOLUTION ORAL at 21:00

## 2019-12-13 RX ADMIN — MELATONIN TAB 3 MG SCH MG: 3 TAB at 21:00

## 2019-12-13 RX ADMIN — CARBIDOPA AND LEVODOPA SCH TAB: 25; 100 TABLET ORAL at 11:50

## 2019-12-13 RX ADMIN — Medication SCH MG: at 09:00

## 2019-12-13 RX ADMIN — CARBIDOPA AND LEVODOPA SCH TAB: 25; 100 TABLET ORAL at 11:56

## 2019-12-13 RX ADMIN — MEMANTINE HYDROCHLORIDE SCH MG: 10 TABLET ORAL at 11:53

## 2019-12-13 RX ADMIN — Medication SCH MCG: at 11:53

## 2019-12-13 RX ADMIN — TRAZODONE HYDROCHLORIDE SCH MG: 100 TABLET ORAL at 21:00

## 2019-12-13 RX ADMIN — MEMANTINE HYDROCHLORIDE SCH MG: 10 TABLET ORAL at 21:00

## 2019-12-13 RX ADMIN — CARBIDOPA AND LEVODOPA SCH TAB: 25; 100 TABLET ORAL at 18:06

## 2019-12-13 RX ADMIN — POLYETHYLENE GLYCOL 3350 SCH GM: 17 POWDER, FOR SOLUTION ORAL at 11:53

## 2019-12-13 RX ADMIN — FINASTERIDE SCH MG: 5 TABLET, FILM COATED ORAL at 11:52

## 2019-12-13 RX ADMIN — MIRTAZAPINE SCH MG: 15 TABLET, ORALLY DISINTEGRATING ORAL at 21:00

## 2019-12-13 RX ADMIN — CARBIDOPA AND LEVODOPA SCH TAB: 25; 100 TABLET ORAL at 14:00

## 2019-12-13 RX ADMIN — DIVALPROEX SODIUM SCH MG: 500 TABLET, FILM COATED, EXTENDED RELEASE ORAL at 21:00

## 2019-12-13 RX ADMIN — ENTACAPONE SCH MG: 200 TABLET, FILM COATED ORAL at 21:00

## 2019-12-13 RX ADMIN — QUETIAPINE FUMARATE SCH MG: 100 TABLET, FILM COATED ORAL at 21:00

## 2019-12-13 RX ADMIN — QUETIAPINE FUMARATE SCH MG: 50 TABLET, FILM COATED ORAL at 11:53

## 2019-12-13 RX ADMIN — QUETIAPINE FUMARATE SCH MG: 50 TABLET, FILM COATED ORAL at 11:50

## 2019-12-13 RX ADMIN — CITALOPRAM HYDROBROMIDE SCH MG: 20 TABLET ORAL at 11:53

## 2019-12-13 RX ADMIN — METOPROLOL SUCCINATE SCH MG: 50 TABLET, EXTENDED RELEASE ORAL at 11:53

## 2019-12-13 RX ADMIN — CARBIDOPA AND LEVODOPA SCH TAB: 25; 100 TABLET ORAL at 05:16

## 2019-12-13 RX ADMIN — CARBIDOPA AND LEVODOPA SCH TAB.SA: 25; 100 TABLET, EXTENDED RELEASE ORAL at 21:00

## 2019-12-13 RX ADMIN — QUETIAPINE FUMARATE SCH MG: 50 TABLET, FILM COATED ORAL at 14:00

## 2019-12-13 RX ADMIN — RIVASTIGMINE SCH PATCH: 9.5 PATCH, EXTENDED RELEASE TRANSDERMAL at 11:52

## 2019-12-13 RX ADMIN — MELOXICAM SCH MG: 7.5 TABLET ORAL at 11:50

## 2019-12-13 RX ADMIN — MELOXICAM SCH MG: 7.5 TABLET ORAL at 11:54

## 2019-12-13 NOTE — PN
DATE:  12/12/2019



SUBJECTIVE:  The patient was seen today, met with the staff, chart reviewed. 

The patient continues to be agitated, exit-seeking behaviors, checking the doors

constantly, resistive, unpredictable behaviors, poor impulse control, low

frustration tolerance.  The patient also does not communicate.  The patient also

pacing constantly one and to the other.  The patient has not had any falls.



OBSERVATION:

VITAL SIGNS:  Temperature 97.2, blood pressure 106/66, pulse 84, respirations

20, O2 sat 95%.

GENERAL:  Slept about 8 hours last night.



CURRENT MEDICATIONS:  The patient's current medications include Seroquel 50 mg

b.i.d., Depakote 1000 mg at night, Exelon patch daily, Namenda 10 mg b.i.d.,

mirtazapine 7.5 mg at night, lorazepam 1 mg b.i.d., melatonin 6 mg at night and

Seroquel 100 mg at night.  The patient is not having any side effects to

medications.  No falls.



ASSESSMENT:

1.  Lewy body dementia with behavior problems.

2.  Anxiety disorder, unspecified.

3.  Impulse control disorder, unspecified.



PLAN:  To continue with the treatment.



LENGTH OF STAY:  5-7 days.





______________________________

CAITLYN GUEVARA MD DR:  CULLEN/georgina  JOB#:  [id number] / [id number]

DD:  12/12/2019 16:29  DT:  12/13/2019 00:12

## 2019-12-14 VITALS — DIASTOLIC BLOOD PRESSURE: 77 MMHG | SYSTOLIC BLOOD PRESSURE: 108 MMHG

## 2019-12-14 RX ADMIN — CARBIDOPA AND LEVODOPA SCH TAB: 25; 100 TABLET ORAL at 20:38

## 2019-12-14 RX ADMIN — Medication SCH MG: at 08:41

## 2019-12-14 RX ADMIN — METOPROLOL SUCCINATE SCH MG: 50 TABLET, EXTENDED RELEASE ORAL at 08:39

## 2019-12-14 RX ADMIN — MEMANTINE HYDROCHLORIDE SCH MG: 10 TABLET ORAL at 20:38

## 2019-12-14 RX ADMIN — MELATONIN TAB 3 MG SCH MG: 3 TAB at 20:38

## 2019-12-14 RX ADMIN — QUETIAPINE FUMARATE SCH MG: 50 TABLET, FILM COATED ORAL at 17:28

## 2019-12-14 RX ADMIN — Medication SCH MCG: at 08:36

## 2019-12-14 RX ADMIN — DIVALPROEX SODIUM SCH MG: 500 TABLET, FILM COATED, EXTENDED RELEASE ORAL at 02:15

## 2019-12-14 RX ADMIN — CARBIDOPA AND LEVODOPA SCH TAB: 25; 100 TABLET ORAL at 17:28

## 2019-12-14 RX ADMIN — ENTACAPONE SCH MG: 200 TABLET, FILM COATED ORAL at 02:16

## 2019-12-14 RX ADMIN — CARBIDOPA AND LEVODOPA SCH TAB: 25; 100 TABLET ORAL at 08:36

## 2019-12-14 RX ADMIN — QUETIAPINE FUMARATE SCH MG: 100 TABLET, FILM COATED ORAL at 02:15

## 2019-12-14 RX ADMIN — CARBIDOPA AND LEVODOPA SCH TAB.SA: 25; 100 TABLET, EXTENDED RELEASE ORAL at 02:16

## 2019-12-14 RX ADMIN — CARBIDOPA AND LEVODOPA SCH TAB.SA: 25; 100 TABLET, EXTENDED RELEASE ORAL at 20:38

## 2019-12-14 RX ADMIN — QUETIAPINE FUMARATE SCH MG: 50 TABLET, FILM COATED ORAL at 08:38

## 2019-12-14 RX ADMIN — CARBIDOPA AND LEVODOPA SCH TAB: 25; 100 TABLET ORAL at 14:00

## 2019-12-14 RX ADMIN — TRAZODONE HYDROCHLORIDE SCH MG: 100 TABLET ORAL at 20:38

## 2019-12-14 RX ADMIN — MEMANTINE HYDROCHLORIDE SCH MG: 10 TABLET ORAL at 08:36

## 2019-12-14 RX ADMIN — ENTACAPONE SCH MG: 200 TABLET, FILM COATED ORAL at 20:38

## 2019-12-14 RX ADMIN — MELATONIN TAB 3 MG SCH MG: 3 TAB at 02:16

## 2019-12-14 RX ADMIN — CARBIDOPA AND LEVODOPA SCH TAB: 25; 100 TABLET ORAL at 05:06

## 2019-12-14 RX ADMIN — CITALOPRAM HYDROBROMIDE SCH MG: 20 TABLET ORAL at 08:39

## 2019-12-14 RX ADMIN — QUETIAPINE FUMARATE SCH MG: 100 TABLET, FILM COATED ORAL at 20:38

## 2019-12-14 RX ADMIN — MIRTAZAPINE SCH MG: 15 TABLET, ORALLY DISINTEGRATING ORAL at 20:38

## 2019-12-14 RX ADMIN — FINASTERIDE SCH MG: 5 TABLET, FILM COATED ORAL at 08:38

## 2019-12-14 RX ADMIN — ENTACAPONE SCH MG: 200 TABLET, FILM COATED ORAL at 08:38

## 2019-12-14 RX ADMIN — MEMANTINE HYDROCHLORIDE SCH MG: 10 TABLET ORAL at 02:14

## 2019-12-14 RX ADMIN — POLYETHYLENE GLYCOL 3350 SCH GM: 17 POWDER, FOR SOLUTION ORAL at 20:37

## 2019-12-14 RX ADMIN — DIVALPROEX SODIUM SCH MG: 500 TABLET, FILM COATED, EXTENDED RELEASE ORAL at 20:38

## 2019-12-14 RX ADMIN — POLYETHYLENE GLYCOL 3350 SCH GM: 17 POWDER, FOR SOLUTION ORAL at 08:39

## 2019-12-14 RX ADMIN — RIVASTIGMINE SCH PATCH: 9.5 PATCH, EXTENDED RELEASE TRANSDERMAL at 08:36

## 2019-12-14 RX ADMIN — MIRTAZAPINE SCH MG: 15 TABLET, ORALLY DISINTEGRATING ORAL at 02:15

## 2019-12-14 RX ADMIN — TRAZODONE HYDROCHLORIDE SCH MG: 100 TABLET ORAL at 02:15

## 2019-12-14 RX ADMIN — CARBIDOPA AND LEVODOPA SCH TAB: 25; 100 TABLET ORAL at 02:14

## 2019-12-14 RX ADMIN — MELOXICAM SCH MG: 7.5 TABLET ORAL at 08:39

## 2019-12-14 NOTE — PN
DATE:  12/13/2019



SUBJECTIVE:  The patient was seen today, met with the staff, chart reviewed. 

The patient's behavior fluctuates.  The patient continues to have problems,

confusion, paranoid, also delusional thinking, fluctuating mood, irritability.



OBSERVATION:

VITAL SIGNS:  Temperature 97.6, blood pressure 110/68, pulse 59, respirations

16, O2 sat 98%.  Slept about 9 hours last night.



CURRENT MEDICATIONS:  The patient's current medications include Seroquel 50 mg

b.i.d., Depakote 1000 mg at night, Namenda 10 mg b.i.d., mirtazapine 7.5 mg at

night, lorazepam 1 mg b.i.d., melatonin 6 mg at night and Seroquel 100 mg at

night.  The patient is not having any side effects.  No falls.



ASSESSMENT:

1.  Lewy body dementia with behavior problems.

2.  Anxiety disorder, unspecified.

3.  Impulse control disorder, unspecified.



PLAN:  Continue with the treatment.



LENGTH OF STAY:  5-7 days.





______________________________

CAITLYN GUEVARA MD



DR:  CULLEN/georgina  JOB#:  [id number] / [id number]

DD:  12/13/2019 15:37  DT:  12/14/2019 01:39

## 2019-12-15 VITALS — SYSTOLIC BLOOD PRESSURE: 136 MMHG | DIASTOLIC BLOOD PRESSURE: 80 MMHG

## 2019-12-15 VITALS — DIASTOLIC BLOOD PRESSURE: 64 MMHG | SYSTOLIC BLOOD PRESSURE: 110 MMHG

## 2019-12-15 RX ADMIN — POLYETHYLENE GLYCOL 3350 SCH GM: 17 POWDER, FOR SOLUTION ORAL at 20:31

## 2019-12-15 RX ADMIN — ENTACAPONE SCH MG: 200 TABLET, FILM COATED ORAL at 20:32

## 2019-12-15 RX ADMIN — MELATONIN TAB 3 MG SCH MG: 3 TAB at 20:32

## 2019-12-15 RX ADMIN — QUETIAPINE FUMARATE SCH MG: 50 TABLET, FILM COATED ORAL at 14:00

## 2019-12-15 RX ADMIN — Medication SCH MG: at 08:18

## 2019-12-15 RX ADMIN — POLYETHYLENE GLYCOL 3350 SCH GM: 17 POWDER, FOR SOLUTION ORAL at 08:19

## 2019-12-15 RX ADMIN — CARBIDOPA AND LEVODOPA SCH TAB: 25; 100 TABLET ORAL at 08:17

## 2019-12-15 RX ADMIN — FINASTERIDE SCH MG: 5 TABLET, FILM COATED ORAL at 08:17

## 2019-12-15 RX ADMIN — ENTACAPONE SCH MG: 200 TABLET, FILM COATED ORAL at 08:19

## 2019-12-15 RX ADMIN — QUETIAPINE FUMARATE SCH MG: 100 TABLET, FILM COATED ORAL at 20:32

## 2019-12-15 RX ADMIN — TRAZODONE HYDROCHLORIDE SCH MG: 100 TABLET ORAL at 20:33

## 2019-12-15 RX ADMIN — CARBIDOPA AND LEVODOPA SCH TAB: 25; 100 TABLET ORAL at 14:00

## 2019-12-15 RX ADMIN — QUETIAPINE FUMARATE SCH MG: 50 TABLET, FILM COATED ORAL at 08:16

## 2019-12-15 RX ADMIN — DIVALPROEX SODIUM SCH MG: 500 TABLET, FILM COATED, EXTENDED RELEASE ORAL at 20:32

## 2019-12-15 RX ADMIN — MELOXICAM SCH MG: 7.5 TABLET ORAL at 08:19

## 2019-12-15 RX ADMIN — CARBIDOPA AND LEVODOPA SCH TAB: 25; 100 TABLET ORAL at 06:12

## 2019-12-15 RX ADMIN — CARBIDOPA AND LEVODOPA SCH TAB: 25; 100 TABLET ORAL at 22:14

## 2019-12-15 RX ADMIN — CARBIDOPA AND LEVODOPA SCH TAB: 25; 100 TABLET ORAL at 18:00

## 2019-12-15 RX ADMIN — RIVASTIGMINE SCH PATCH: 9.5 PATCH, EXTENDED RELEASE TRANSDERMAL at 08:14

## 2019-12-15 RX ADMIN — METOPROLOL SUCCINATE SCH MG: 50 TABLET, EXTENDED RELEASE ORAL at 08:18

## 2019-12-15 RX ADMIN — MEMANTINE HYDROCHLORIDE SCH MG: 10 TABLET ORAL at 08:15

## 2019-12-15 RX ADMIN — Medication SCH MCG: at 08:16

## 2019-12-15 RX ADMIN — CITALOPRAM HYDROBROMIDE SCH MG: 20 TABLET ORAL at 08:19

## 2019-12-15 RX ADMIN — CARBIDOPA AND LEVODOPA SCH TAB.SA: 25; 100 TABLET, EXTENDED RELEASE ORAL at 20:32

## 2019-12-15 RX ADMIN — MIRTAZAPINE SCH MG: 15 TABLET, ORALLY DISINTEGRATING ORAL at 20:33

## 2019-12-15 RX ADMIN — MEMANTINE HYDROCHLORIDE SCH MG: 10 TABLET ORAL at 20:32

## 2019-12-15 NOTE — PN
DATE:  12/14/2019



SUBJECTIVE:  The patient was seen today, met with the staff, chart reviewed. 

The patient's motor behavior remains the same, unpredictable, irritable, hughes,

kicking the staff, constantly pacing.  The patient is refusing his medications.



OBSERVATION:

VITAL SIGNS:  Temperature 97.6, blood pressure 110/68, pulse 59, respirations

16, O2 sat 98%.  The patient slept about 9 hours last night.



LABORATORY DATA:  The patient's lab reviewed.



MEDICATIONS:  The patient's current medications include Seroquel 50 mg b.i.d.,

Depakote 1000 mg at night, Namenda 10 mg b.i.d., mirtazapine 7.5 mg at night,

lorazepam 1 mg b.i.d., melatonin 6 mg at night and Seroquel 100 mg at night.  No

side effects.  No falls.



ASSESSMENT:

1.  Lewy body dementia with behavior problems.

2.  Anxiety disorder, unspecified.

3.  Impulse control disorder, unspecified.



PLAN:  To continue with the treatment.



LENGTH OF STAY:  5-7 days.





______________________________

CAITLYN GUEVARA MD



DR:  CULLEN/georgina  JOB#:  [id number] / [id number]

DD:  12/14/2019 11:41  DT:  12/15/2019 01:23

## 2019-12-16 VITALS — SYSTOLIC BLOOD PRESSURE: 160 MMHG | DIASTOLIC BLOOD PRESSURE: 84 MMHG

## 2019-12-16 VITALS — SYSTOLIC BLOOD PRESSURE: 162 MMHG | DIASTOLIC BLOOD PRESSURE: 85 MMHG

## 2019-12-16 RX ADMIN — MEMANTINE HYDROCHLORIDE SCH MG: 10 TABLET ORAL at 21:03

## 2019-12-16 RX ADMIN — FINASTERIDE SCH MG: 5 TABLET, FILM COATED ORAL at 07:50

## 2019-12-16 RX ADMIN — MELATONIN TAB 3 MG SCH MG: 3 TAB at 21:03

## 2019-12-16 RX ADMIN — QUETIAPINE FUMARATE SCH MG: 50 TABLET, FILM COATED ORAL at 07:51

## 2019-12-16 RX ADMIN — CARBIDOPA AND LEVODOPA SCH TAB: 25; 100 TABLET ORAL at 05:15

## 2019-12-16 RX ADMIN — METOPROLOL SUCCINATE SCH MG: 50 TABLET, EXTENDED RELEASE ORAL at 07:49

## 2019-12-16 RX ADMIN — CARBIDOPA AND LEVODOPA SCH TAB: 25; 100 TABLET ORAL at 14:25

## 2019-12-16 RX ADMIN — CITALOPRAM HYDROBROMIDE SCH MG: 20 TABLET ORAL at 07:49

## 2019-12-16 RX ADMIN — QUETIAPINE FUMARATE SCH MG: 100 TABLET, FILM COATED ORAL at 21:03

## 2019-12-16 RX ADMIN — Medication SCH MCG: at 07:51

## 2019-12-16 RX ADMIN — MELOXICAM SCH MG: 7.5 TABLET ORAL at 07:51

## 2019-12-16 RX ADMIN — RIVASTIGMINE SCH PATCH: 9.5 PATCH, EXTENDED RELEASE TRANSDERMAL at 07:50

## 2019-12-16 RX ADMIN — MIRTAZAPINE SCH MG: 15 TABLET, ORALLY DISINTEGRATING ORAL at 21:04

## 2019-12-16 RX ADMIN — CARBIDOPA AND LEVODOPA SCH TAB: 25; 100 TABLET ORAL at 22:26

## 2019-12-16 RX ADMIN — CARBIDOPA AND LEVODOPA SCH TAB: 25; 100 TABLET ORAL at 07:49

## 2019-12-16 RX ADMIN — ENTACAPONE SCH MG: 200 TABLET, FILM COATED ORAL at 21:03

## 2019-12-16 RX ADMIN — POLYETHYLENE GLYCOL 3350 SCH GM: 17 POWDER, FOR SOLUTION ORAL at 07:51

## 2019-12-16 RX ADMIN — Medication SCH MG: at 07:52

## 2019-12-16 RX ADMIN — POLYETHYLENE GLYCOL 3350 SCH GM: 17 POWDER, FOR SOLUTION ORAL at 21:05

## 2019-12-16 RX ADMIN — CARBIDOPA AND LEVODOPA SCH TAB.SA: 25; 100 TABLET, EXTENDED RELEASE ORAL at 21:03

## 2019-12-16 RX ADMIN — QUETIAPINE FUMARATE SCH MG: 50 TABLET, FILM COATED ORAL at 14:25

## 2019-12-16 RX ADMIN — ENTACAPONE SCH MG: 200 TABLET, FILM COATED ORAL at 07:50

## 2019-12-16 RX ADMIN — MEMANTINE HYDROCHLORIDE SCH MG: 10 TABLET ORAL at 07:50

## 2019-12-16 RX ADMIN — CARBIDOPA AND LEVODOPA SCH TAB: 25; 100 TABLET ORAL at 18:00

## 2019-12-16 RX ADMIN — TRAZODONE HYDROCHLORIDE SCH MG: 100 TABLET ORAL at 21:03

## 2019-12-16 RX ADMIN — DIVALPROEX SODIUM SCH MG: 500 TABLET, FILM COATED, EXTENDED RELEASE ORAL at 21:03

## 2019-12-16 NOTE — PN
DATE:  12/15/2019



SUBJECTIVE:  The patient was seen today, met with the staff, chart reviewed. 

The patient's behavior continued to fluctuate.  The patient less anxious, less

pacing, but still having behavior problems, difficult to read or write.  He is

confused, disorganized, poor impulse control, low frustration tolerance and also

increased psychomotor activity.  The patient is also exhibiting significant

behavioral change during the daytime.  At other times, he is very drowsy.  When

he is awake, he is agitated, confused, wandering and also exhibiting poor

impulse control, low frustration tolerance.  The patient has been compliant with

his treatment at this time.



MEDICATIONS:  The patient's current medications include Seroquel 50 mg b.i.d.,

Depakote 1000 mg at night, Namenda 10 mg b.i.d., mirtazapine 7.5 mg at night,

lorazepam 1 mg b.i.d., melatonin 6 mg at night, Seroquel 100 mg at night.  No

side effects.  No falls.



ASSESSMENT:

1.  Lewy body dementia with behavior problems.

2.  Anxiety disorder, unspecified.

3.  Impulse control disorder, unspecified.



PLAN:  To continue with the treatment.



LENGTH OF STAY:  5-7 days.





______________________________

CAITLYN GUEVARA MD DR:  CULLEN/georgina  JOB#:  [id number] / [id number]

DD:  12/15/2019 11:50  DT:  12/15/2019 22:53

## 2019-12-17 VITALS — SYSTOLIC BLOOD PRESSURE: 150 MMHG | DIASTOLIC BLOOD PRESSURE: 81 MMHG

## 2019-12-17 VITALS — DIASTOLIC BLOOD PRESSURE: 85 MMHG | SYSTOLIC BLOOD PRESSURE: 130 MMHG

## 2019-12-17 LAB
ALBUMIN SERPL-MCNC: 3.3 G/DL (ref 3.4–5)
ALBUMIN/GLOB SERPL: 1 {RATIO} (ref 1–1.7)
ALP SERPL-CCNC: 96 U/L (ref 46–116)
ALT SERPL-CCNC: 13 U/L (ref 16–63)
ANION GAP SERPL CALC-SCNC: 4 MMOL/L (ref 6–14)
AST SERPL-CCNC: 27 U/L (ref 15–37)
BASOPHILS # BLD AUTO: 0 X10^3/UL (ref 0–0.2)
BASOPHILS NFR BLD: 1 % (ref 0–3)
BILIRUB SERPL-MCNC: 0.4 MG/DL (ref 0.2–1)
BUN/CREAT SERPL: 38 (ref 6–20)
CA-I SERPL ISE-MCNC: 38 MG/DL (ref 8–26)
CALCIUM SERPL-MCNC: 9.2 MG/DL (ref 8.5–10.1)
CHLORIDE SERPL-SCNC: 108 MMOL/L (ref 98–107)
CO2 SERPL-SCNC: 33 MMOL/L (ref 21–32)
CREAT SERPL-MCNC: 1 MG/DL (ref 0.7–1.3)
EOSINOPHIL NFR BLD: 0.5 X10^3/UL (ref 0–0.7)
EOSINOPHIL NFR BLD: 13 % (ref 0–3)
ERYTHROCYTE [DISTWIDTH] IN BLOOD BY AUTOMATED COUNT: 13.2 % (ref 11.5–14.5)
GFR SERPLBLD BASED ON 1.73 SQ M-ARVRAT: 74.5 ML/MIN
GLOBULIN SER-MCNC: 3.3 G/DL (ref 2.2–3.8)
GLUCOSE SERPL-MCNC: 87 MG/DL (ref 70–99)
HCT VFR BLD CALC: 44.5 % (ref 39–53)
HGB BLD-MCNC: 15 G/DL (ref 13–17.5)
LYMPHOCYTES # BLD: 1.1 X10^3/UL (ref 1–4.8)
LYMPHOCYTES NFR BLD AUTO: 31 % (ref 24–48)
MAGNESIUM SERPL-MCNC: 2.1 MG/DL (ref 1.8–2.4)
MCH RBC QN AUTO: 30 PG (ref 25–35)
MCHC RBC AUTO-ENTMCNC: 34 G/DL (ref 31–37)
MCV RBC AUTO: 88 FL (ref 79–100)
MONO #: 0.4 X10^3/UL (ref 0–1.1)
MONOCYTES NFR BLD: 12 % (ref 0–9)
NEUT #: 1.5 X10^3UL (ref 1.8–7.7)
NEUTROPHILS NFR BLD AUTO: 44 % (ref 31–73)
PLATELET # BLD AUTO: 148 X10^3/UL (ref 140–400)
POTASSIUM SERPL-SCNC: 4.8 MMOL/L (ref 3.5–5.1)
PROT SERPL-MCNC: 6.6 G/DL (ref 6.4–8.2)
RBC # BLD AUTO: 5.06 X10^6/UL (ref 4.3–5.7)
SODIUM SERPL-SCNC: 145 MMOL/L (ref 136–145)
WBC # BLD AUTO: 3.5 X10^3/UL (ref 4–11)

## 2019-12-17 RX ADMIN — OLANZAPINE SCH MG: 10 INJECTION, POWDER, FOR SOLUTION INTRAMUSCULAR at 08:36

## 2019-12-17 RX ADMIN — MELATONIN TAB 3 MG SCH MG: 3 TAB at 20:09

## 2019-12-17 RX ADMIN — CARBIDOPA AND LEVODOPA SCH TAB: 25; 100 TABLET ORAL at 18:41

## 2019-12-17 RX ADMIN — MEMANTINE HYDROCHLORIDE SCH MG: 10 TABLET ORAL at 20:08

## 2019-12-17 RX ADMIN — CARBIDOPA AND LEVODOPA SCH TAB: 25; 100 TABLET ORAL at 15:15

## 2019-12-17 RX ADMIN — RIVASTIGMINE SCH PATCH: 9.5 PATCH, EXTENDED RELEASE TRANSDERMAL at 08:28

## 2019-12-17 RX ADMIN — CITALOPRAM HYDROBROMIDE SCH MG: 20 TABLET ORAL at 11:14

## 2019-12-17 RX ADMIN — POLYETHYLENE GLYCOL 3350 SCH GM: 17 POWDER, FOR SOLUTION ORAL at 20:09

## 2019-12-17 RX ADMIN — METOPROLOL SUCCINATE SCH MG: 50 TABLET, EXTENDED RELEASE ORAL at 11:14

## 2019-12-17 RX ADMIN — MEMANTINE HYDROCHLORIDE SCH MG: 10 TABLET ORAL at 11:14

## 2019-12-17 RX ADMIN — MIRTAZAPINE SCH MG: 15 TABLET, ORALLY DISINTEGRATING ORAL at 20:08

## 2019-12-17 RX ADMIN — TRAZODONE HYDROCHLORIDE SCH MG: 100 TABLET ORAL at 20:08

## 2019-12-17 RX ADMIN — POLYETHYLENE GLYCOL 3350 SCH GM: 17 POWDER, FOR SOLUTION ORAL at 11:15

## 2019-12-17 RX ADMIN — MAGNESIUM HYDROXIDE PRN MG: 400 SUSPENSION ORAL at 11:18

## 2019-12-17 RX ADMIN — QUETIAPINE FUMARATE SCH MG: 100 TABLET, FILM COATED ORAL at 20:08

## 2019-12-17 RX ADMIN — ENTACAPONE SCH MG: 200 TABLET, FILM COATED ORAL at 20:08

## 2019-12-17 RX ADMIN — Medication SCH MG: at 11:15

## 2019-12-17 RX ADMIN — Medication SCH MCG: at 10:26

## 2019-12-17 RX ADMIN — CARBIDOPA AND LEVODOPA SCH TAB: 25; 100 TABLET ORAL at 22:03

## 2019-12-17 RX ADMIN — CARBIDOPA AND LEVODOPA SCH TAB: 25; 100 TABLET ORAL at 05:48

## 2019-12-17 RX ADMIN — QUETIAPINE FUMARATE SCH MG: 50 TABLET, FILM COATED ORAL at 08:27

## 2019-12-17 RX ADMIN — CARBIDOPA AND LEVODOPA SCH TAB.SA: 25; 100 TABLET, EXTENDED RELEASE ORAL at 20:08

## 2019-12-17 RX ADMIN — QUETIAPINE FUMARATE SCH MG: 50 TABLET, FILM COATED ORAL at 14:00

## 2019-12-17 RX ADMIN — CARBIDOPA AND LEVODOPA SCH TAB: 25; 100 TABLET ORAL at 11:13

## 2019-12-17 RX ADMIN — MELOXICAM SCH MG: 7.5 TABLET ORAL at 11:14

## 2019-12-17 RX ADMIN — FINASTERIDE SCH MG: 5 TABLET, FILM COATED ORAL at 11:15

## 2019-12-17 RX ADMIN — ENTACAPONE SCH MG: 200 TABLET, FILM COATED ORAL at 11:14

## 2019-12-17 RX ADMIN — DIVALPROEX SODIUM SCH MG: 125 CAPSULE, COATED PELLETS ORAL at 20:08

## 2019-12-17 NOTE — PDOC
Exam


Note:


Aren Note:


Please also refer to the separate dictated note~for this date of service 

dictated separately.~Patient seen individually. Discussed the patient with 

Nursing staff reviewed the chart.~Reviewed interim history and current 

functioning. Reviewed vital signs,~Labs/ Radiology~and current medications noted

below. Continue current treatment with the changes noted in the dictated 

addendum note





Assessment:


Vital Signs/I&O:





                                   Vital Signs








  Date Time  Temp Pulse Resp B/P (MAP) Pulse Ox O2 Delivery O2 Flow Rate FiO2


 


12/17/19 15:57 97.7 66 18 150/81 (104) 93   


 


12/17/19 05:26      Room Air  














                                    I & O   


 


 12/16/19 12/16/19 12/17/19





 15:00 23:00 07:00


 


Intake Total 480 ml 0 ml 240 ml


 


Balance 480 ml 0 ml 240 ml








Labs:





                                Laboratory Tests








Test


 12/17/19


06:28


 


White Blood Count


 3.5 x10^3/uL


(4.0-11.0)  L


 


Red Blood Count


 5.06 x10^6/uL


(4.30-5.70)


 


Hemoglobin


 15.0 g/dL


(13.0-17.5)


 


Hematocrit


 44.5 %


(39.0-53.0)


 


Mean Corpuscular Volume


 88 fL ()





 


Mean Corpuscular Hemoglobin 30 pg (25-35)  


 


Mean Corpuscular Hemoglobin


Concent 34 g/dL


(31-37)


 


Red Cell Distribution Width


 13.2 %


(11.5-14.5)


 


Platelet Count


 148 x10^3/uL


(140-400)


 


Neutrophils (%) (Auto) 44 % (31-73)  


 


Lymphocytes (%) (Auto) 31 % (24-48)  


 


Monocytes (%) (Auto) 12 % (0-9)  H


 


Eosinophils (%) (Auto) 13 % (0-3)  H


 


Basophils (%) (Auto) 1 % (0-3)  


 


Neutrophils # (Auto)


 1.5 x10^3uL


(1.8-7.7)  L


 


Lymphocytes # (Auto)


 1.1 x10^3/uL


(1.0-4.8)


 


Monocytes # (Auto)


 0.4 x10^3/uL


(0.0-1.1)


 


Eosinophils # (Auto)


 0.5 x10^3/uL


(0.0-0.7)


 


Basophils # (Auto)


 0.0 x10^3/uL


(0.0-0.2)


 


Sodium Level


 145 mmol/L


(136-145)


 


Potassium Level


 4.8 mmol/L


(3.5-5.1)


 


Chloride Level


 108 mmol/L


()  H


 


Carbon Dioxide Level


 33 mmol/L


(21-32)  H


 


Anion Gap 4 (6-14)  L


 


Blood Urea Nitrogen


 38 mg/dL


(8-26)  H


 


Creatinine


 1.0 mg/dL


(0.7-1.3)


 


Estimated GFR


(Cockcroft-Gault) 74.5  





 


BUN/Creatinine Ratio 38 (6-20)  H


 


Glucose Level


 87 mg/dL


(70-99)


 


Calcium Level


 9.2 mg/dL


(8.5-10.1)


 


Magnesium Level


 2.1 mg/dL


(1.8-2.4)


 


Total Bilirubin


 0.4 mg/dL


(0.2-1.0)


 


Aspartate Amino Transferase


(AST) 27 U/L (15-37)





 


Alanine Aminotransferase (ALT)


 13 U/L (16-63)


L


 


Alkaline Phosphatase


 96 U/L


()


 


Total Protein


 6.6 g/dL


(6.4-8.2)


 


Albumin


 3.3 g/dL


(3.4-5.0)  L


 


Albumin/Globulin Ratio 1.0 (1.0-1.7)  











Current Medications:


Meds:





Current Medications








 Medications


  (Trade)  Dose


 Ordered  Sig/Elijah


 Route


 PRN Reason  Start Time


 Stop Time Status Last Admin


Dose Admin


 


 Olanzapine


  (ZyPREXA)  5 mg  DAILY


 PO


   12/17/19 09:00


    12/17/19 08:27





 


 Divalproex Sodium


  (Depakote


 Sprinkles)  1,000 mg  HS


 PO


   12/17/19 21:00


    12/17/19 20:08











I have reviewed the current psychotropics carefully including drug interactions.

 Risk benefit ratio favors no change other than as noted in my dictated progress

note.





Diagnosis:


Problems:  


(1) Anxiety disorder


(2) Impulse control disorder


(3) Lewy body dementia with behavioral disturbance


(4) Major neurocognitive disorder, due to vascular disease, with behavioral 

disturbance, mild











MIRIAM JACOBS MD                 Dec 17, 2019 22:10

## 2019-12-17 NOTE — PN
DATE:  12/16/2019



SUBJECTIVE:  The patient was seen today, met with the staff, chart reviewed. 

The patient's behavior remains the same, fluctuating.  The patient is very

confused, not able to follow directions.  The patient also tends to pace and

also, the patient is exhibiting poor impulse control and low frustration

tolerance and psychomotor agitation.  The patient's behavior fluctuates

considerably during the daytime.  The patient also becomes very drowsy, wanting

to sleep.  The patient has been compliant with his treatment.



OBSERVATION:

VITAL SIGNS:  Temperature 97.2, blood pressure 162/85, pulse 67, respirations

20, O2 sat 97%.  Slept about 7 hours last night.



LABORATORY DATA:  The patient's lab reviewed.



MEDICATIONS:  The patient's current medications include Seroquel 50 mg b.i.d.,

Depakote 1000 mg at night, Namenda 10 mg b.i.d., mirtazapine 7.5 mg at night,

lorazepam 1 mg b.i.d., and melatonin 6 mg at night.  The patient is also on

Seroquel 100 mg at night.  The patient is not showing any major side effects.



ASSESSMENT:

1.  Lewy body dementia with behavior problems, anxiety disorder, unspecified.

2.  Impulse control disorder, unspecified.



PLAN:  To continue with the treatment.



LENGTH OF STAY:  5-7 days.





______________________________

CAITLYN GUEVARA MD



DR:  Jerson  JOB#:  [id number] / [id number]

DD:  12/16/2019 16:37  DT:  12/17/2019 00:41

## 2019-12-18 VITALS — DIASTOLIC BLOOD PRESSURE: 67 MMHG | SYSTOLIC BLOOD PRESSURE: 99 MMHG

## 2019-12-18 VITALS — DIASTOLIC BLOOD PRESSURE: 73 MMHG | SYSTOLIC BLOOD PRESSURE: 120 MMHG

## 2019-12-18 VITALS — SYSTOLIC BLOOD PRESSURE: 100 MMHG | DIASTOLIC BLOOD PRESSURE: 58 MMHG

## 2019-12-18 RX ADMIN — CARBIDOPA AND LEVODOPA SCH TAB: 25; 100 TABLET ORAL at 10:41

## 2019-12-18 RX ADMIN — CARBIDOPA AND LEVODOPA SCH TAB.SA: 25; 100 TABLET, EXTENDED RELEASE ORAL at 20:17

## 2019-12-18 RX ADMIN — DIVALPROEX SODIUM SCH MG: 125 CAPSULE, COATED PELLETS ORAL at 20:16

## 2019-12-18 RX ADMIN — RIVASTIGMINE SCH PATCH: 9.5 PATCH, EXTENDED RELEASE TRANSDERMAL at 10:41

## 2019-12-18 RX ADMIN — ENTACAPONE SCH MG: 200 TABLET, FILM COATED ORAL at 20:17

## 2019-12-18 RX ADMIN — MELATONIN TAB 3 MG SCH MG: 3 TAB at 20:17

## 2019-12-18 RX ADMIN — CARBIDOPA AND LEVODOPA SCH TAB: 25; 100 TABLET ORAL at 21:56

## 2019-12-18 RX ADMIN — CARBIDOPA AND LEVODOPA SCH TAB: 25; 100 TABLET ORAL at 15:27

## 2019-12-18 RX ADMIN — FINASTERIDE SCH MG: 5 TABLET, FILM COATED ORAL at 10:40

## 2019-12-18 RX ADMIN — POLYETHYLENE GLYCOL 3350 SCH GM: 17 POWDER, FOR SOLUTION ORAL at 20:17

## 2019-12-18 RX ADMIN — POLYETHYLENE GLYCOL 3350 SCH GM: 17 POWDER, FOR SOLUTION ORAL at 10:41

## 2019-12-18 RX ADMIN — CARBIDOPA AND LEVODOPA SCH TAB: 25; 100 TABLET ORAL at 16:58

## 2019-12-18 RX ADMIN — CARBIDOPA AND LEVODOPA SCH TAB: 25; 100 TABLET ORAL at 05:50

## 2019-12-18 RX ADMIN — ENTACAPONE SCH MG: 200 TABLET, FILM COATED ORAL at 10:41

## 2019-12-18 RX ADMIN — QUETIAPINE FUMARATE SCH MG: 50 TABLET, FILM COATED ORAL at 15:17

## 2019-12-18 RX ADMIN — Medication SCH MCG: at 10:43

## 2019-12-18 RX ADMIN — MEMANTINE HYDROCHLORIDE SCH MG: 10 TABLET ORAL at 20:17

## 2019-12-18 RX ADMIN — Medication SCH MG: at 10:40

## 2019-12-18 RX ADMIN — TRAZODONE HYDROCHLORIDE PRN MG: 100 TABLET ORAL at 22:06

## 2019-12-18 RX ADMIN — MIRTAZAPINE SCH MG: 15 TABLET, ORALLY DISINTEGRATING ORAL at 20:18

## 2019-12-18 RX ADMIN — OLANZAPINE SCH MG: 10 INJECTION, POWDER, FOR SOLUTION INTRAMUSCULAR at 10:31

## 2019-12-18 RX ADMIN — METOPROLOL SUCCINATE SCH MG: 50 TABLET, EXTENDED RELEASE ORAL at 10:34

## 2019-12-18 RX ADMIN — QUETIAPINE FUMARATE SCH MG: 50 TABLET, FILM COATED ORAL at 10:43

## 2019-12-18 RX ADMIN — MELOXICAM SCH MG: 7.5 TABLET ORAL at 10:42

## 2019-12-18 RX ADMIN — TRAZODONE HYDROCHLORIDE SCH MG: 100 TABLET ORAL at 20:17

## 2019-12-18 RX ADMIN — MEMANTINE HYDROCHLORIDE SCH MG: 10 TABLET ORAL at 10:43

## 2019-12-18 RX ADMIN — CITALOPRAM HYDROBROMIDE SCH MG: 20 TABLET ORAL at 10:43
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
## 2019-12-18 NOTE — PN
DATE:  12/17/2019



PSYCHIATRIC PROGRESS NOTE



This late entry 12/17/2019 covers elements not covered in my initial note.



SUBJECTIVE:  I met with the patient in the evening.  Per TARIK Perez, the

patient has been combative at staff, hitting staff, crawling and rolling himself

on the floor, does not know his medications, Seroquel at 2:00 p.m. was held

since he was sedated at times, unsteady in his gait, hallucinating at times.  IM

Ativan has been stopped and he is on a combination of Depakote, trazodone,

melatonin, Seroquel, Celexa, Nuplazid, Namenda, Exelon, Remeron.



REVIEW OF SYSTEMS:  Ambulation impaired.  No CV, , pulmonary, eye, ENT system

symptoms on review.  Reliability poor.



MENTAL STATUS EXAM:  Oriented to himself.  Insight, judgment, recent and remote

memory, attention, concentration, fund of knowledge poor, consistent with his

diagnosis mentioned in my initial note.



PLAN:  No change from initial note.  If sedation persists, we may stop the

Seroquel and then consider reducing the Depakote.  Rest unchanged for now.  I

have a message to call his wife and I will give her a call 12/18/2019.





______________________________

MAN BRANDIN JACOBS MD



DR:  ISABELA/georgina  JOB#:  [id number] / [id number]

DD:  12/18/2019 17:53  DT:  12/18/2019 22:33

## 2019-12-18 NOTE — PDOC
Exam


Note:


Aren Note:


Please also refer to the separate dictated note~for this date of service 

dictated separately.~Patient seen individually. Discussed the patient with 

Nursing staff reviewed the chart.~Reviewed interim history and current 

functioning. Reviewed vital signs,~Labs/ Radiology~and current medications noted

below. Continue current treatment with the changes noted in the dictated 

addendum note





Assessment:


Vital Signs/I&O:





                                   Vital Signs








  Date Time  Temp Pulse Resp B/P (MAP) Pulse Ox O2 Delivery O2 Flow Rate FiO2


 


12/18/19 16:31 97.8 86 19 100/58 (72) 99   


 


12/18/19 10:36      Room Air  














                                    I & O   


 


 12/17/19 12/17/19 12/18/19





 15:00 23:00 07:00


 


Intake Total 320 ml 120 ml 


 


Balance 320 ml 120 ml 











Current Medications:


I have reviewed the current psychotropics carefully including drug interactions.

 Risk benefit ratio favors no change other than as noted in my dictated progress

note.





Diagnosis:


Problems:  


(1) Anxiety disorder


(2) Impulse control disorder


(3) Lewy body dementia with behavioral disturbance


(4) Major neurocognitive disorder, due to vascular disease, with behavioral 

disturbance, mild











MIRIAM JACOBS MD                 Dec 18, 2019 23:30

## 2019-12-19 VITALS — DIASTOLIC BLOOD PRESSURE: 65 MMHG | SYSTOLIC BLOOD PRESSURE: 102 MMHG

## 2019-12-19 VITALS — SYSTOLIC BLOOD PRESSURE: 154 MMHG | DIASTOLIC BLOOD PRESSURE: 82 MMHG

## 2019-12-19 RX ADMIN — CARBIDOPA AND LEVODOPA SCH TAB: 25; 100 TABLET ORAL at 11:09

## 2019-12-19 RX ADMIN — ENTACAPONE SCH MG: 200 TABLET, FILM COATED ORAL at 20:08

## 2019-12-19 RX ADMIN — MELOXICAM SCH MG: 7.5 TABLET ORAL at 08:42

## 2019-12-19 RX ADMIN — CHOLECALCIFEROL CAP 1.25 MG (50000 UNIT) SCH UNIT: 1.25 CAP at 08:41

## 2019-12-19 RX ADMIN — MELATONIN TAB 3 MG SCH MG: 3 TAB at 20:08

## 2019-12-19 RX ADMIN — POLYETHYLENE GLYCOL 3350 SCH GM: 17 POWDER, FOR SOLUTION ORAL at 20:06

## 2019-12-19 RX ADMIN — CARBIDOPA AND LEVODOPA SCH TAB: 25; 100 TABLET ORAL at 13:43

## 2019-12-19 RX ADMIN — TRAZODONE HYDROCHLORIDE SCH MG: 100 TABLET ORAL at 20:08

## 2019-12-19 RX ADMIN — Medication SCH MG: at 08:41

## 2019-12-19 RX ADMIN — OLANZAPINE SCH MG: 10 INJECTION, POWDER, FOR SOLUTION INTRAMUSCULAR at 08:43

## 2019-12-19 RX ADMIN — CITALOPRAM HYDROBROMIDE SCH MG: 20 TABLET ORAL at 08:42

## 2019-12-19 RX ADMIN — RIVASTIGMINE SCH PATCH: 9.5 PATCH, EXTENDED RELEASE TRANSDERMAL at 08:41

## 2019-12-19 RX ADMIN — Medication SCH MCG: at 08:42

## 2019-12-19 RX ADMIN — CARBIDOPA AND LEVODOPA SCH TAB: 25; 100 TABLET ORAL at 17:15

## 2019-12-19 RX ADMIN — ENTACAPONE SCH MG: 200 TABLET, FILM COATED ORAL at 08:42

## 2019-12-19 RX ADMIN — CARBIDOPA AND LEVODOPA SCH TAB: 25; 100 TABLET ORAL at 22:50

## 2019-12-19 RX ADMIN — POLYETHYLENE GLYCOL 3350 SCH GM: 17 POWDER, FOR SOLUTION ORAL at 08:41

## 2019-12-19 RX ADMIN — CARBIDOPA AND LEVODOPA SCH TAB: 25; 100 TABLET ORAL at 06:25

## 2019-12-19 RX ADMIN — MEMANTINE HYDROCHLORIDE SCH MG: 10 TABLET ORAL at 08:42

## 2019-12-19 RX ADMIN — CARBIDOPA AND LEVODOPA SCH TAB.SA: 25; 100 TABLET, EXTENDED RELEASE ORAL at 20:07

## 2019-12-19 RX ADMIN — MEMANTINE HYDROCHLORIDE SCH MG: 10 TABLET ORAL at 20:08

## 2019-12-19 RX ADMIN — DIVALPROEX SODIUM SCH MG: 125 CAPSULE, COATED PELLETS ORAL at 20:07

## 2019-12-19 RX ADMIN — METOPROLOL SUCCINATE SCH MG: 50 TABLET, EXTENDED RELEASE ORAL at 08:42

## 2019-12-19 RX ADMIN — FINASTERIDE SCH MG: 5 TABLET, FILM COATED ORAL at 08:42

## 2019-12-19 RX ADMIN — MIRTAZAPINE SCH MG: 15 TABLET, ORALLY DISINTEGRATING ORAL at 20:08

## 2019-12-19 RX ADMIN — OLANZAPINE SCH MG: 10 INJECTION, POWDER, FOR SOLUTION INTRAMUSCULAR at 09:00

## 2019-12-19 NOTE — PDOC
Exam


Note:


Aren Note:


Please also refer to the separate dictated note~for this date of service 

dictated separately.~Patient seen individually. Discussed the patient with 

Nursing staff reviewed the chart.~Reviewed interim history and current 

functioning. Reviewed vital signs,~Labs/ Radiology~and current medications noted

below. Continue current treatment with the changes noted in the dictated 

addendum note





Assessment:


Vital Signs/I&O:





                                   Vital Signs








  Date Time  Temp Pulse Resp B/P (MAP) Pulse Ox O2 Delivery O2 Flow Rate FiO2


 


12/19/19 16:19 97.8 69 16 102/65 (77) 97   


 


12/18/19 10:36      Room Air  














                                    I & O   


 


 12/18/19 12/18/19 12/19/19





 15:00 23:00 07:00


 


Intake Total 800 ml 240 ml 240 ml


 


Balance 800 ml 240 ml 240 ml











Current Medications:


I have reviewed the current psychotropics carefully including drug interactions.

 Risk benefit ratio favors no change other than as noted in my dictated progress

note.





Diagnosis:


Problems:  


(1) Anxiety disorder


(2) Impulse control disorder


(3) Lewy body dementia with behavioral disturbance


(4) Major neurocognitive disorder, due to vascular disease, with behavioral 

disturbance, mild











MIRIAM JACOBS MD                 Dec 19, 2019 20:49

## 2019-12-20 VITALS — SYSTOLIC BLOOD PRESSURE: 144 MMHG | DIASTOLIC BLOOD PRESSURE: 83 MMHG

## 2019-12-20 VITALS — SYSTOLIC BLOOD PRESSURE: 149 MMHG | DIASTOLIC BLOOD PRESSURE: 81 MMHG

## 2019-12-20 RX ADMIN — TRAZODONE HYDROCHLORIDE SCH MG: 100 TABLET ORAL at 19:47

## 2019-12-20 RX ADMIN — FINASTERIDE SCH MG: 5 TABLET, FILM COATED ORAL at 09:00

## 2019-12-20 RX ADMIN — CARBIDOPA AND LEVODOPA SCH TAB: 25; 100 TABLET ORAL at 05:46

## 2019-12-20 RX ADMIN — MIRTAZAPINE SCH MG: 15 TABLET, ORALLY DISINTEGRATING ORAL at 19:48

## 2019-12-20 RX ADMIN — CARBIDOPA AND LEVODOPA SCH TAB: 25; 100 TABLET ORAL at 13:36

## 2019-12-20 RX ADMIN — RIVASTIGMINE SCH PATCH: 9.5 PATCH, EXTENDED RELEASE TRANSDERMAL at 09:00

## 2019-12-20 RX ADMIN — POLYETHYLENE GLYCOL 3350 SCH GM: 17 POWDER, FOR SOLUTION ORAL at 19:48

## 2019-12-20 RX ADMIN — ENTACAPONE SCH MG: 200 TABLET, FILM COATED ORAL at 19:47

## 2019-12-20 RX ADMIN — CARBIDOPA AND LEVODOPA SCH TAB: 25; 100 TABLET ORAL at 22:33

## 2019-12-20 RX ADMIN — CITALOPRAM HYDROBROMIDE SCH MG: 20 TABLET ORAL at 09:00

## 2019-12-20 RX ADMIN — OLANZAPINE SCH MG: 10 INJECTION, POWDER, FOR SOLUTION INTRAMUSCULAR at 09:00

## 2019-12-20 RX ADMIN — MELATONIN TAB 3 MG SCH MG: 3 TAB at 19:47

## 2019-12-20 RX ADMIN — MEMANTINE HYDROCHLORIDE SCH MG: 10 TABLET ORAL at 19:47

## 2019-12-20 RX ADMIN — MELOXICAM SCH MG: 7.5 TABLET ORAL at 09:00

## 2019-12-20 RX ADMIN — MEMANTINE HYDROCHLORIDE SCH MG: 10 TABLET ORAL at 09:00

## 2019-12-20 RX ADMIN — Medication SCH MCG: at 09:00

## 2019-12-20 RX ADMIN — ENTACAPONE SCH MG: 200 TABLET, FILM COATED ORAL at 09:00

## 2019-12-20 RX ADMIN — CARBIDOPA AND LEVODOPA SCH TAB.SA: 25; 100 TABLET, EXTENDED RELEASE ORAL at 19:47

## 2019-12-20 RX ADMIN — OLANZAPINE SCH MG: 10 INJECTION, POWDER, FOR SOLUTION INTRAMUSCULAR at 10:00

## 2019-12-20 RX ADMIN — METOPROLOL SUCCINATE SCH MG: 50 TABLET, EXTENDED RELEASE ORAL at 09:00

## 2019-12-20 RX ADMIN — DIVALPROEX SODIUM SCH MG: 125 CAPSULE, COATED PELLETS ORAL at 19:47

## 2019-12-20 RX ADMIN — Medication SCH MG: at 09:00

## 2019-12-20 RX ADMIN — CARBIDOPA AND LEVODOPA SCH TAB: 25; 100 TABLET ORAL at 09:10

## 2019-12-20 RX ADMIN — POLYETHYLENE GLYCOL 3350 SCH GM: 17 POWDER, FOR SOLUTION ORAL at 09:00

## 2019-12-20 RX ADMIN — CARBIDOPA AND LEVODOPA SCH TAB: 25; 100 TABLET ORAL at 17:28
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
## 2019-12-20 NOTE — PDOC
Exam


Note:


Aren Note:


Please also refer to the separate dictated note~for this date of service 

dictated separately.~Patient seen individually. Discussed the patient with 

Nursing staff reviewed the chart.~Reviewed interim history and current 

functioning. Reviewed vital signs,~Labs/ Radiology~and current medications noted

below. Continue current treatment with the changes noted in the dictated 

addendum note





Assessment:


Vital Signs/I&O:





                                   Vital Signs








  Date Time  Temp Pulse Resp B/P (MAP) Pulse Ox O2 Delivery O2 Flow Rate FiO2


 


12/20/19 16:19 97.6 83 16 144/83 (103) 100 Room Air  














                                    I & O   


 


 12/19/19 12/19/19 12/20/19





 15:00 23:00 07:00


 


Intake Total 240 ml 120 ml 120 ml


 


Balance 240 ml 120 ml 120 ml











Current Medications:


I have reviewed the current psychotropics carefully including drug interactions.

 Risk benefit ratio favors no change other than as noted in my dictated progress

note.





Diagnosis:


Problems:  


(1) Anxiety disorder


(2) Impulse control disorder


(3) Lewy body dementia with behavioral disturbance


(4) Major neurocognitive disorder, due to vascular disease, with behavioral 

disturbance, mild











MIRIAM JACOBS MD                 Dec 20, 2019 21:38

## 2019-12-20 NOTE — PN
DATE:  12/18/2019



PSYCHIATRIC  PROGRESS NOTE



This late entry, 12/18/2019, covers elements not covered in my initial note.



SUBJECTIVE:  I met with the patient in the evening of 12/18/2019.  Per TARIK Perez, the patient slept 7-1/4 hours previous night.  Seroquel has been stopped

since he was quite sedated, fall risk.  Wife had expressed extreme concern about

the sedation.  Blood pressure was low as well.



REVIEW OF SYSTEMS:  No CV, , pulmonary, eye, ENT system symptoms on review. 

Reliability poor.



MENTAL STATUS EXAM:  Oriented to himself.  Insight, judgment, recent and remote

memory, attention, concentration, fund of knowledge poor, consistent with his

diagnosis.



I had a message to call the wife, returned a call, left a very detailed message

on her voicemail about the patient's progress that we are going to stop the

Seroquel and we are obtaining records from [facility] in [place].



LABORATORY DATA:  Reviewed.



IMPRESSION:  Unchanged from initial note.



PLAN:  As above.





______________________________

MAN BRANDIN JACOBS MD



DR:  ISABELA/georgina  JOB#:  [id number] / [id number]

DD:  12/20/2019 17:15  DT:  12/20/2019 23:13

## 2019-12-21 VITALS — SYSTOLIC BLOOD PRESSURE: 184 MMHG | DIASTOLIC BLOOD PRESSURE: 97 MMHG

## 2019-12-21 VITALS — DIASTOLIC BLOOD PRESSURE: 80 MMHG | SYSTOLIC BLOOD PRESSURE: 157 MMHG

## 2019-12-21 RX ADMIN — ENTACAPONE SCH MG: 200 TABLET, FILM COATED ORAL at 08:21

## 2019-12-21 RX ADMIN — DIVALPROEX SODIUM SCH MG: 125 CAPSULE, COATED PELLETS ORAL at 21:00

## 2019-12-21 RX ADMIN — MEMANTINE HYDROCHLORIDE SCH MG: 10 TABLET ORAL at 08:22

## 2019-12-21 RX ADMIN — ENTACAPONE SCH MG: 200 TABLET, FILM COATED ORAL at 21:00

## 2019-12-21 RX ADMIN — OLANZAPINE SCH MG: 10 INJECTION, POWDER, FOR SOLUTION INTRAMUSCULAR at 08:22

## 2019-12-21 RX ADMIN — CARBIDOPA AND LEVODOPA SCH TAB: 25; 100 TABLET ORAL at 10:00

## 2019-12-21 RX ADMIN — CARBIDOPA AND LEVODOPA SCH TAB: 25; 100 TABLET ORAL at 22:00

## 2019-12-21 RX ADMIN — MEMANTINE HYDROCHLORIDE SCH MG: 10 TABLET ORAL at 21:00

## 2019-12-21 RX ADMIN — Medication SCH MG: at 08:22

## 2019-12-21 RX ADMIN — CARBIDOPA AND LEVODOPA SCH TAB.SA: 25; 100 TABLET, EXTENDED RELEASE ORAL at 21:00

## 2019-12-21 RX ADMIN — CITALOPRAM HYDROBROMIDE SCH MG: 20 TABLET ORAL at 08:21

## 2019-12-21 RX ADMIN — Medication SCH MCG: at 08:22

## 2019-12-21 RX ADMIN — MIRTAZAPINE SCH MG: 15 TABLET, ORALLY DISINTEGRATING ORAL at 21:00

## 2019-12-21 RX ADMIN — MELATONIN TAB 3 MG SCH MG: 3 TAB at 21:00

## 2019-12-21 RX ADMIN — POLYETHYLENE GLYCOL 3350 SCH GM: 17 POWDER, FOR SOLUTION ORAL at 08:20

## 2019-12-21 RX ADMIN — METOPROLOL SUCCINATE SCH MG: 50 TABLET, EXTENDED RELEASE ORAL at 08:22

## 2019-12-21 RX ADMIN — MELOXICAM SCH MG: 7.5 TABLET ORAL at 08:21

## 2019-12-21 RX ADMIN — FINASTERIDE SCH MG: 5 TABLET, FILM COATED ORAL at 08:22

## 2019-12-21 RX ADMIN — TRAZODONE HYDROCHLORIDE SCH MG: 100 TABLET ORAL at 21:00

## 2019-12-21 RX ADMIN — RIVASTIGMINE SCH PATCH: 9.5 PATCH, EXTENDED RELEASE TRANSDERMAL at 08:21

## 2019-12-21 RX ADMIN — OLANZAPINE SCH MG: 10 INJECTION, POWDER, FOR SOLUTION INTRAMUSCULAR at 11:10

## 2019-12-21 RX ADMIN — POLYETHYLENE GLYCOL 3350 SCH GM: 17 POWDER, FOR SOLUTION ORAL at 21:00

## 2019-12-21 RX ADMIN — CARBIDOPA AND LEVODOPA SCH TAB: 25; 100 TABLET ORAL at 14:00

## 2019-12-21 RX ADMIN — CARBIDOPA AND LEVODOPA SCH TAB: 25; 100 TABLET ORAL at 17:47

## 2019-12-21 RX ADMIN — CARBIDOPA AND LEVODOPA SCH TAB: 25; 100 TABLET ORAL at 05:39

## 2019-12-21 NOTE — PDOC
Exam


Note:


Aren Note:


Please also refer to the separate dictated note~for this date of service 

dictated separately.~Patient seen individually. Discussed the patient with 

Nursing staff reviewed the chart.~Reviewed interim history and current 

functioning. Reviewed vital signs,~Labs/ Radiology~and current medications noted

below. Continue current treatment with the changes noted in the dictated 

addendum note





Assessment:


Vital Signs/I&O:





                                   Vital Signs








  Date Time  Temp Pulse Resp B/P (MAP) Pulse Ox O2 Delivery O2 Flow Rate FiO2


 


12/21/19 16:14 98.7 98 16 184/97 (126) 96   


 


12/20/19 16:19      Room Air  














                                    I & O   


 


 12/20/19 12/20/19 12/21/19





 15:00 23:00 07:00


 


Intake Total 600 ml 120 ml 


 


Balance 600 ml 120 ml 











Current Medications:


Meds:





Current Medications








 Medications


  (Trade)  Dose


 Ordered  Sig/Elijah


 Route


 PRN Reason  Start Time


 Stop Time Status Last Admin


Dose Admin


 


 Lorazepam


  (Ativan Inj)  1 mg  DAILY


 IM


   12/21/19 13:30


    12/21/19 13:30





 


 Haloperidol


 Lactate


  (Haldol)  5 mg  DAILY  ONCE


 IM


   12/21/19 13:30


 12/21/19 13:37 DC 12/21/19 13:30











I have reviewed the current psychotropics carefully including drug interactions.

 Risk benefit ratio favors no change other than as noted in my dictated progress

note.





Diagnosis:


Problems:  


(1) Anxiety disorder


(2) Impulse control disorder


(3) Lewy body dementia with behavioral disturbance


(4) Major neurocognitive disorder, due to vascular disease, with behavioral 

disturbance, mild











MIRIAM JACOBS MD                 Dec 21, 2019 21:35

## 2019-12-22 VITALS — DIASTOLIC BLOOD PRESSURE: 97 MMHG | SYSTOLIC BLOOD PRESSURE: 154 MMHG

## 2019-12-22 VITALS — SYSTOLIC BLOOD PRESSURE: 100 MMHG | DIASTOLIC BLOOD PRESSURE: 62 MMHG

## 2019-12-22 LAB
ALBUMIN SERPL-MCNC: 3.4 G/DL (ref 3.4–5)
ALBUMIN/GLOB SERPL: 1.1 {RATIO} (ref 1–1.7)
ALP SERPL-CCNC: 87 U/L (ref 46–116)
ALT SERPL-CCNC: 19 U/L (ref 16–63)
ANION GAP SERPL CALC-SCNC: 4 MMOL/L (ref 6–14)
AST SERPL-CCNC: 39 U/L (ref 15–37)
BASOPHILS # BLD AUTO: 0 X10^3/UL (ref 0–0.2)
BASOPHILS NFR BLD: 1 % (ref 0–3)
BILIRUB SERPL-MCNC: 0.5 MG/DL (ref 0.2–1)
BUN/CREAT SERPL: 33 (ref 6–20)
CA-I SERPL ISE-MCNC: 36 MG/DL (ref 8–26)
CALCIUM SERPL-MCNC: 8.9 MG/DL (ref 8.5–10.1)
CHLORIDE SERPL-SCNC: 107 MMOL/L (ref 98–107)
CO2 SERPL-SCNC: 32 MMOL/L (ref 21–32)
CREAT SERPL-MCNC: 1.1 MG/DL (ref 0.7–1.3)
EOSINOPHIL NFR BLD: 0.3 X10^3/UL (ref 0–0.7)
EOSINOPHIL NFR BLD: 7 % (ref 0–3)
ERYTHROCYTE [DISTWIDTH] IN BLOOD BY AUTOMATED COUNT: 12.7 % (ref 11.5–14.5)
GFR SERPLBLD BASED ON 1.73 SQ M-ARVRAT: 66.8 ML/MIN
GLOBULIN SER-MCNC: 3 G/DL (ref 2.2–3.8)
GLUCOSE SERPL-MCNC: 134 MG/DL (ref 70–99)
HCT VFR BLD CALC: 42.1 % (ref 39–53)
HGB BLD-MCNC: 14.1 G/DL (ref 13–17.5)
LYMPHOCYTES # BLD: 0.6 X10^3/UL (ref 1–4.8)
LYMPHOCYTES NFR BLD AUTO: 13 % (ref 24–48)
MCH RBC QN AUTO: 29 PG (ref 25–35)
MCHC RBC AUTO-ENTMCNC: 34 G/DL (ref 31–37)
MCV RBC AUTO: 87 FL (ref 79–100)
MONO #: 0.5 X10^3/UL (ref 0–1.1)
MONOCYTES NFR BLD: 11 % (ref 0–9)
NEUT #: 3.5 X10^3UL (ref 1.8–7.7)
NEUTROPHILS NFR BLD AUTO: 69 % (ref 31–73)
PLATELET # BLD AUTO: 138 X10^3/UL (ref 140–400)
POTASSIUM SERPL-SCNC: 5.1 MMOL/L (ref 3.5–5.1)
PROT SERPL-MCNC: 6.4 G/DL (ref 6.4–8.2)
RBC # BLD AUTO: 4.83 X10^6/UL (ref 4.3–5.7)
SODIUM SERPL-SCNC: 143 MMOL/L (ref 136–145)
WBC # BLD AUTO: 5 X10^3/UL (ref 4–11)

## 2019-12-22 RX ADMIN — CARBIDOPA AND LEVODOPA SCH TAB: 25; 100 TABLET ORAL at 17:17

## 2019-12-22 RX ADMIN — Medication SCH MG: at 08:44

## 2019-12-22 RX ADMIN — MIRTAZAPINE SCH MG: 15 TABLET, ORALLY DISINTEGRATING ORAL at 19:36

## 2019-12-22 RX ADMIN — FINASTERIDE SCH MG: 5 TABLET, FILM COATED ORAL at 08:45

## 2019-12-22 RX ADMIN — CARBIDOPA AND LEVODOPA SCH TAB.SA: 25; 100 TABLET, EXTENDED RELEASE ORAL at 19:36

## 2019-12-22 RX ADMIN — ENTACAPONE SCH MG: 200 TABLET, FILM COATED ORAL at 19:36

## 2019-12-22 RX ADMIN — CARBIDOPA AND LEVODOPA SCH TAB: 25; 100 TABLET ORAL at 14:05

## 2019-12-22 RX ADMIN — CARBIDOPA AND LEVODOPA SCH TAB: 25; 100 TABLET ORAL at 06:06

## 2019-12-22 RX ADMIN — TRAZODONE HYDROCHLORIDE SCH MG: 100 TABLET ORAL at 19:36

## 2019-12-22 RX ADMIN — MELOXICAM SCH MG: 7.5 TABLET ORAL at 08:45

## 2019-12-22 RX ADMIN — POLYETHYLENE GLYCOL 3350 SCH GM: 17 POWDER, FOR SOLUTION ORAL at 08:45

## 2019-12-22 RX ADMIN — RIVASTIGMINE SCH PATCH: 9.5 PATCH, EXTENDED RELEASE TRANSDERMAL at 08:46

## 2019-12-22 RX ADMIN — DIVALPROEX SODIUM SCH MG: 125 CAPSULE, COATED PELLETS ORAL at 19:35

## 2019-12-22 RX ADMIN — CARBIDOPA AND LEVODOPA SCH TAB: 25; 100 TABLET ORAL at 09:56

## 2019-12-22 RX ADMIN — Medication SCH MCG: at 08:46

## 2019-12-22 RX ADMIN — POLYETHYLENE GLYCOL 3350 SCH GM: 17 POWDER, FOR SOLUTION ORAL at 19:36

## 2019-12-22 RX ADMIN — METOPROLOL SUCCINATE SCH MG: 50 TABLET, EXTENDED RELEASE ORAL at 08:46

## 2019-12-22 RX ADMIN — MEMANTINE HYDROCHLORIDE SCH MG: 10 TABLET ORAL at 08:45

## 2019-12-22 RX ADMIN — MELATONIN TAB 3 MG SCH MG: 3 TAB at 19:36

## 2019-12-22 RX ADMIN — CITALOPRAM HYDROBROMIDE SCH MG: 20 TABLET ORAL at 08:45

## 2019-12-22 RX ADMIN — ENTACAPONE SCH MG: 200 TABLET, FILM COATED ORAL at 08:45

## 2019-12-22 RX ADMIN — MEMANTINE HYDROCHLORIDE SCH MG: 10 TABLET ORAL at 19:36

## 2019-12-22 RX ADMIN — CARBIDOPA AND LEVODOPA SCH TAB: 25; 100 TABLET ORAL at 22:00

## 2019-12-22 NOTE — PDOC
Exam


Note:


Aren Note:


Please also refer to the separate dictated note~for this date of service 

dictated separately.~Patient seen individually. Discussed the patient with 

Nursing staff reviewed the chart.~Reviewed interim history and current 

functioning. Reviewed vital signs,~Labs/ Radiology~and current medications noted

below. Continue current treatment with the changes noted in the dictated 

addendum note





Assessment:


Vital Signs/I&O:





                                   Vital Signs








  Date Time  Temp Pulse Resp B/P (MAP) Pulse Ox O2 Delivery O2 Flow Rate FiO2


 


12/22/19 15:54 97.7 67 16 100/62 (75) 98   


 


12/20/19 16:19      Room Air  














                                    I & O   


 


 12/21/19 12/21/19 12/22/19





 14:59 22:59 06:59


 


Intake Total 680 ml 0 ml 0 ml


 


Balance 680 ml 0 ml 0 ml








Labs:





                                Laboratory Tests








Test


 12/22/19


11:10


 


White Blood Count


 5.0 x10^3/uL


(4.0-11.0)


 


Red Blood Count


 4.83 x10^6/uL


(4.30-5.70)


 


Hemoglobin


 14.1 g/dL


(13.0-17.5)


 


Hematocrit


 42.1 %


(39.0-53.0)


 


Mean Corpuscular Volume


 87 fL ()





 


Mean Corpuscular Hemoglobin 29 pg (25-35)  


 


Mean Corpuscular Hemoglobin


Concent 34 g/dL


(31-37)


 


Red Cell Distribution Width


 12.7 %


(11.5-14.5)


 


Platelet Count


 138 x10^3/uL


(140-400)  L


 


Neutrophils (%) (Auto) 69 % (31-73)  


 


Lymphocytes (%) (Auto) 13 % (24-48)  L


 


Monocytes (%) (Auto) 11 % (0-9)  H


 


Eosinophils (%) (Auto) 7 % (0-3)  H


 


Basophils (%) (Auto) 1 % (0-3)  


 


Neutrophils # (Auto)


 3.5 x10^3uL


(1.8-7.7)


 


Lymphocytes # (Auto)


 0.6 x10^3/uL


(1.0-4.8)  L


 


Monocytes # (Auto)


 0.5 x10^3/uL


(0.0-1.1)


 


Eosinophils # (Auto)


 0.3 x10^3/uL


(0.0-0.7)


 


Basophils # (Auto)


 0.0 x10^3/uL


(0.0-0.2)


 


Sodium Level


 143 mmol/L


(136-145)


 


Potassium Level


 5.1 mmol/L


(3.5-5.1)


 


Chloride Level


 107 mmol/L


()


 


Carbon Dioxide Level


 32 mmol/L


(21-32)


 


Anion Gap 4 (6-14)  L


 


Blood Urea Nitrogen


 36 mg/dL


(8-26)  H


 


Creatinine


 1.1 mg/dL


(0.7-1.3)


 


Estimated GFR


(Cockcroft-Gault) 66.8  





 


BUN/Creatinine Ratio 33 (6-20)  H


 


Glucose Level


 134 mg/dL


(70-99)  H


 


Calcium Level


 8.9 mg/dL


(8.5-10.1)


 


Total Bilirubin


 0.5 mg/dL


(0.2-1.0)


 


Aspartate Amino Transferase


(AST) 39 U/L (15-37)


H


 


Alanine Aminotransferase (ALT)


 19 U/L (16-63)





 


Alkaline Phosphatase


 87 U/L


()


 


Total Protein


 6.4 g/dL


(6.4-8.2)


 


Albumin


 3.4 g/dL


(3.4-5.0)


 


Albumin/Globulin Ratio 1.1 (1.0-1.7)  











Current Medications:


Meds:





Current Medications








 Medications


  (Trade)  Dose


 Ordered  Sig/Elijah


 Route


 PRN Reason  Start Time


 Stop Time Status Last Admin


Dose Admin


 


 Clozapine


  (Clozaril)  25 mg  DAILY


 PO


   12/22/19 13:00


    12/22/19 14:05











I have reviewed the current psychotropics carefully including drug interactions.

 Risk benefit ratio favors no change other than as noted in my dictated progress

note.





Diagnosis:


Problems:  


(1) Anxiety disorder


(2) Impulse control disorder


(3) Lewy body dementia with behavioral disturbance


(4) Major neurocognitive disorder, due to vascular disease, with behavioral 

disturbance, mild











MIRIAM JACOBS MD                 Dec 22, 2019 21:06

## 2019-12-23 VITALS — SYSTOLIC BLOOD PRESSURE: 103 MMHG | DIASTOLIC BLOOD PRESSURE: 70 MMHG

## 2019-12-23 VITALS — DIASTOLIC BLOOD PRESSURE: 69 MMHG | SYSTOLIC BLOOD PRESSURE: 111 MMHG

## 2019-12-23 LAB
BASOPHILS # BLD AUTO: 0.1 X10^3/UL (ref 0–0.2)
BASOPHILS NFR BLD: 1 % (ref 0–3)
EOSINOPHIL NFR BLD: 0.5 X10^3/UL (ref 0–0.7)
EOSINOPHIL NFR BLD: 11 % (ref 0–3)
ERYTHROCYTE [DISTWIDTH] IN BLOOD BY AUTOMATED COUNT: 13 % (ref 11.5–14.5)
HCT VFR BLD CALC: 41 % (ref 39–53)
HGB BLD-MCNC: 13.6 G/DL (ref 13–17.5)
LYMPHOCYTES # BLD: 1.1 X10^3/UL (ref 1–4.8)
LYMPHOCYTES NFR BLD AUTO: 23 % (ref 24–48)
MCH RBC QN AUTO: 29 PG (ref 25–35)
MCHC RBC AUTO-ENTMCNC: 33 G/DL (ref 31–37)
MCV RBC AUTO: 88 FL (ref 79–100)
MONO #: 0.5 X10^3/UL (ref 0–1.1)
MONOCYTES NFR BLD: 11 % (ref 0–9)
NEUT #: 2.6 X10^3UL (ref 1.8–7.7)
NEUTROPHILS NFR BLD AUTO: 54 % (ref 31–73)
PLATELET # BLD AUTO: 139 X10^3/UL (ref 140–400)
RBC # BLD AUTO: 4.64 X10^6/UL (ref 4.3–5.7)
WBC # BLD AUTO: 4.8 X10^3/UL (ref 4–11)

## 2019-12-23 RX ADMIN — MELATONIN TAB 3 MG SCH MG: 3 TAB at 20:30

## 2019-12-23 RX ADMIN — CARBIDOPA AND LEVODOPA SCH TAB: 25; 100 TABLET ORAL at 14:00

## 2019-12-23 RX ADMIN — POLYETHYLENE GLYCOL 3350 SCH GM: 17 POWDER, FOR SOLUTION ORAL at 20:29

## 2019-12-23 RX ADMIN — CARBIDOPA AND LEVODOPA SCH TAB: 25; 100 TABLET ORAL at 12:16

## 2019-12-23 RX ADMIN — METOPROLOL SUCCINATE SCH MG: 50 TABLET, EXTENDED RELEASE ORAL at 08:06

## 2019-12-23 RX ADMIN — CARBIDOPA AND LEVODOPA SCH TAB: 25; 100 TABLET ORAL at 05:39

## 2019-12-23 RX ADMIN — TRAZODONE HYDROCHLORIDE SCH MG: 100 TABLET ORAL at 20:30

## 2019-12-23 RX ADMIN — CARBIDOPA AND LEVODOPA SCH TAB: 25; 100 TABLET ORAL at 17:49

## 2019-12-23 RX ADMIN — DIVALPROEX SODIUM SCH MG: 125 CAPSULE, COATED PELLETS ORAL at 20:30

## 2019-12-23 RX ADMIN — FINASTERIDE SCH MG: 5 TABLET, FILM COATED ORAL at 08:04

## 2019-12-23 RX ADMIN — CITALOPRAM HYDROBROMIDE SCH MG: 20 TABLET ORAL at 08:07

## 2019-12-23 RX ADMIN — Medication SCH MG: at 08:04

## 2019-12-23 RX ADMIN — Medication SCH MCG: at 08:05

## 2019-12-23 RX ADMIN — MELOXICAM SCH MG: 7.5 TABLET ORAL at 08:06

## 2019-12-23 RX ADMIN — MEMANTINE HYDROCHLORIDE SCH MG: 10 TABLET ORAL at 20:30

## 2019-12-23 RX ADMIN — MEMANTINE HYDROCHLORIDE SCH MG: 10 TABLET ORAL at 08:05

## 2019-12-23 RX ADMIN — CARBIDOPA AND LEVODOPA SCH TAB.SA: 25; 100 TABLET, EXTENDED RELEASE ORAL at 20:29

## 2019-12-23 RX ADMIN — POLYETHYLENE GLYCOL 3350 SCH GM: 17 POWDER, FOR SOLUTION ORAL at 08:07

## 2019-12-23 RX ADMIN — MIRTAZAPINE SCH MG: 15 TABLET, ORALLY DISINTEGRATING ORAL at 20:30

## 2019-12-23 RX ADMIN — ENTACAPONE SCH MG: 200 TABLET, FILM COATED ORAL at 20:29

## 2019-12-23 RX ADMIN — ENTACAPONE SCH MG: 200 TABLET, FILM COATED ORAL at 08:07

## 2019-12-23 RX ADMIN — CARBIDOPA AND LEVODOPA SCH TAB: 25; 100 TABLET ORAL at 20:29

## 2019-12-23 RX ADMIN — RIVASTIGMINE SCH PATCH: 9.5 PATCH, EXTENDED RELEASE TRANSDERMAL at 08:07

## 2019-12-23 NOTE — PN
DATE:  12/20/2019



PSYCHIATRIC  PROGRESS NOTE



This late entry 12/20/2019 covers elements not covered in my initial note.



SUBJECTIVE:  I met with the patient evening of 12/20/2019 and staffed at a

treatment team meeting with the entire team earlier in the day with the

patient's son Jerry, attending the conference.  We had a lengthy discussion about

the patient's diagnosis, the fact that we stopped the Seroquel consequent to the

wife's persistent complains of sedation on it.  Nevertheless behaviorally, he

was doing better and since Seroquel was stopped he is certainly more aggressive,

disruptive.  Sleeping 5-6 hours.  Appetite 50%.  He has been hallucinating, exit

seeking, delusional, wanting to call the police, running up and down the

hallway.  Nursing staff have called me several times and we have attempted

various interventions pharmacological and nonpharmacological to help control

this and also make sure he does not fall and hurt himself.  That has been quite

challenging.  We may consider the addition of Clozaril if psychotic symptoms

persist along with agitation since he was overly sedated on the Seroquel and

also remains on Depakote.



REVIEW OF SYSTEMS:  No CV, , pulmonary, eye, ENT system symptoms on review. 

Reliability poor.



MENTAL STATUS EXAM:  Oriented to himself.  Insight, judgment, recent and remote

memory, attention, concentration, fund of knowledge poor, consistent with his

diagnosis.



IMPRESSION:  Major neurocognitive disorder, Lewy body with delusion, depression,

behavioral disturbance; anxiety disorder, unspecified; impulse control disorder,

unspecified.



PLAN:  Continue current psychotropics mentioned in my initial note.   Also

remains on Sinemet and Comtan for his Parkinson's Depakote as a mood stabilizer,

trazodone, melatonin, Nuplazid, Celexa, Zyprexa p.o. or IM, Remeron, Namenda,

Exelon patch.  The latter 2 are part of his Lewy body dementia interventions

too.  As stated, we will consider Clozaril in place of Seroquel.  Rest

unchanged.





______________________________

MIRIAM JACOBS MD



DR:  ISABELA/georgina  JOB#:  [id number] / [id number]

DD:  12/23/2019 08:54  DT:  12/23/2019 11:35

## 2019-12-23 NOTE — PN
DATE:  12/21/2019



PSYCHIATRIC PROGRESS NOTE



This late entry 12/21/2019 covers elements not covered in my initial note.



SUBJECTIVE:  I met with the patient in the evening.  The patient slept 6-1/4

hours previous night.  Remains confused, anxious, restless, paranoid.  He is a

fall risk, running up and down the hallway, disruptive, banging on the doors. 

His room was moved closer to the nursing station.  Nursing staff had called me

several times during the day and we had added PRNs and then he was overly

sedated late in the evening.  Being closer to the nursing station would help

with improved closer monitoring while we adjust his psychotropics.



REVIEW OF SYSTEMS:  No CV, , pulmonary, eye, ENT system symptoms on review. 

Reliability poor.



MENTAL STATUS EXAM:  Oriented to himself.  Insight, judgment, recent and remote

memory, attention, concentration, fund of knowledge poor, consistent with his

diagnosis.



IMPRESSION:  Major neurocognitive disorder, Lewy body with delusion, depression,

behavioral disturbance; anxiety disorder, unspecified; impulse control disorder,

unspecified.



PLAN:  Continue current psychotropics, consider Clozaril, maintain Depakote

Sprinkles, level therapeutic at 57 with Depakote Sprinkles 1000 mg at bedtime,

trazodone 100 mg at bedtime plus p.r.n., melatonin 6 mg at bedtime, Nuplazid 34

mg a day, Celexa 20 mg a day, Zyprexa IM or p.o., and we had added Haldol IM and

scheduled.  We will have to stop this depending on his oversedation.  Maintain

Remeron, Exelon patch, Namenda.





______________________________

MAN BRANDIN JACOBS MD



DR:  ISABELA/georgina  JOB#:  [id number] / [id number]

DD:  12/23/2019 08:56  DT:  12/23/2019 11:36

## 2019-12-23 NOTE — PN
DATE:  12/22/2019



PSYCHIATRIC PROGRESS NOTE



This late entry 12/22/2019 covers elements not covered in my initial note.



SUBJECTIVE:  I met with the patient evening of 12/22/2019.  The patient slept 10

hours previous night.  He was again quite agitated, paranoid, psychotic, running

up and down the mead with nursing staff called me.  We clarified the IM Haldol,

Ativan.  Administration will stop the scheduled Haldol IM and start Clozaril 25

mg a day.  In fact, he has done little better since then, but was restless after

waking up and then tried to punch one of the nursing aides and did connect then

little better after Clozaril.



REVIEW OF SYSTEMS:  No CV, , pulmonary, eye, ENT system symptoms on review. 

Reliability poor.



MENTAL STATUS EXAM:  Oriented to himself.  Insight, judgment, recent and remote

memory, attention, concentration, fund of knowledge poor, consistent with his

diagnosis mentioned in my initial note.



IMPRESSION:  Major neurocognitive disorder, Lewy body with delusion, depression,

behavioral disturbance; anxiety disorder, unspecified; impulse control disorder,

unspecified.  Rest unchanged.



PLAN:  As noted above.  No further changes from a psychiatric standpoint.  May

need to increase Clozaril further.





______________________________

MIRIAM JACOBS MD



DR:  ISABELA/georgina  JOB#:  [id number] / [id number]

DD:  12/23/2019 08:58  DT:  12/23/2019 11:37

## 2019-12-23 NOTE — PDOC
Exam


Note:


Aren Note:


Please also refer to the separate dictated note~for this date of service 

dictated separately.~Patient seen individually. Discussed the patient with 

Nursing staff reviewed the chart.~Reviewed interim history and current 

functioning. Reviewed vital signs,~Labs/ Radiology~and current medications noted

below. Continue current treatment with the changes noted in the dictated 

addendum note





Assessment:


Vital Signs/I&O:





                                   Vital Signs








  Date Time  Temp Pulse Resp B/P (MAP) Pulse Ox O2 Delivery O2 Flow Rate FiO2


 


12/23/19 20:25 97.8 66 18 111/69 (83) 96 Room Air  














                                    I & O   


 


 12/22/19 12/22/19 12/23/19





 15:00 23:00 07:00


 


Intake Total 480 ml 360 ml 


 


Balance 480 ml 360 ml 








Labs:





                                Laboratory Tests








Test


 12/23/19


18:50


 


White Blood Count


 4.8 x10^3/uL


(4.0-11.0)


 


Red Blood Count


 4.64 x10^6/uL


(4.30-5.70)


 


Hemoglobin


 13.6 g/dL


(13.0-17.5)


 


Hematocrit


 41.0 %


(39.0-53.0)


 


Mean Corpuscular Volume


 88 fL ()





 


Mean Corpuscular Hemoglobin 29 pg (25-35)  


 


Mean Corpuscular Hemoglobin


Concent 33 g/dL


(31-37)


 


Red Cell Distribution Width


 13.0 %


(11.5-14.5)


 


Platelet Count


 139 x10^3/uL


(140-400)  L


 


Neutrophils (%) (Auto) 54 % (31-73)  


 


Lymphocytes (%) (Auto) 23 % (24-48)  L


 


Monocytes (%) (Auto) 11 % (0-9)  H


 


Eosinophils (%) (Auto) 11 % (0-3)  H


 


Basophils (%) (Auto) 1 % (0-3)  


 


Neutrophils # (Auto)


 2.6 x10^3uL


(1.8-7.7)


 


Lymphocytes # (Auto)


 1.1 x10^3/uL


(1.0-4.8)


 


Monocytes # (Auto)


 0.5 x10^3/uL


(0.0-1.1)


 


Eosinophils # (Auto)


 0.5 x10^3/uL


(0.0-0.7)


 


Basophils # (Auto)


 0.1 x10^3/uL


(0.0-0.2)











Current Medications:


I have reviewed the current psychotropics carefully including drug interactions.

 Risk benefit ratio favors no change other than as noted in my dictated progress

note.





Diagnosis:


Problems:  


(1) Anxiety disorder


(2) Impulse control disorder


(3) Lewy body dementia with behavioral disturbance


(4) Major neurocognitive disorder, due to vascular disease, with behavioral di

sturbance, mild











MIRIAM JACOBS MD                 Dec 23, 2019 21:27

## 2019-12-24 VITALS — DIASTOLIC BLOOD PRESSURE: 76 MMHG | SYSTOLIC BLOOD PRESSURE: 131 MMHG

## 2019-12-24 RX ADMIN — Medication SCH MCG: at 10:09

## 2019-12-24 RX ADMIN — POLYETHYLENE GLYCOL 3350 SCH GM: 17 POWDER, FOR SOLUTION ORAL at 20:09

## 2019-12-24 RX ADMIN — ENTACAPONE SCH MG: 200 TABLET, FILM COATED ORAL at 10:08

## 2019-12-24 RX ADMIN — MIRTAZAPINE SCH MG: 15 TABLET, ORALLY DISINTEGRATING ORAL at 20:09

## 2019-12-24 RX ADMIN — POLYETHYLENE GLYCOL 3350 SCH GM: 17 POWDER, FOR SOLUTION ORAL at 10:08

## 2019-12-24 RX ADMIN — MEMANTINE HYDROCHLORIDE SCH MG: 10 TABLET ORAL at 20:07

## 2019-12-24 RX ADMIN — DIVALPROEX SODIUM SCH MG: 125 CAPSULE, COATED PELLETS ORAL at 20:08

## 2019-12-24 RX ADMIN — METOPROLOL SUCCINATE SCH MG: 50 TABLET, EXTENDED RELEASE ORAL at 10:09

## 2019-12-24 RX ADMIN — FINASTERIDE SCH MG: 5 TABLET, FILM COATED ORAL at 10:09

## 2019-12-24 RX ADMIN — RIVASTIGMINE SCH PATCH: 9.5 PATCH, EXTENDED RELEASE TRANSDERMAL at 10:09

## 2019-12-24 RX ADMIN — CARBIDOPA AND LEVODOPA SCH TAB: 25; 100 TABLET ORAL at 14:04

## 2019-12-24 RX ADMIN — ENTACAPONE SCH MG: 200 TABLET, FILM COATED ORAL at 20:08

## 2019-12-24 RX ADMIN — MEMANTINE HYDROCHLORIDE SCH MG: 10 TABLET ORAL at 10:09

## 2019-12-24 RX ADMIN — CARBIDOPA AND LEVODOPA SCH TAB: 25; 100 TABLET ORAL at 10:10

## 2019-12-24 RX ADMIN — CARBIDOPA AND LEVODOPA SCH TAB: 25; 100 TABLET ORAL at 20:07

## 2019-12-24 RX ADMIN — TRAZODONE HYDROCHLORIDE SCH MG: 100 TABLET ORAL at 20:07

## 2019-12-24 RX ADMIN — CARBIDOPA AND LEVODOPA SCH TAB: 25; 100 TABLET ORAL at 18:20

## 2019-12-24 RX ADMIN — CITALOPRAM HYDROBROMIDE SCH MG: 20 TABLET ORAL at 10:07

## 2019-12-24 RX ADMIN — CARBIDOPA AND LEVODOPA SCH TAB: 25; 100 TABLET ORAL at 05:36

## 2019-12-24 RX ADMIN — MELATONIN TAB 3 MG SCH MG: 3 TAB at 20:08

## 2019-12-24 RX ADMIN — Medication SCH MG: at 10:11

## 2019-12-24 RX ADMIN — MELOXICAM SCH MG: 7.5 TABLET ORAL at 10:08

## 2019-12-24 RX ADMIN — CARBIDOPA AND LEVODOPA SCH TAB.SA: 25; 100 TABLET, EXTENDED RELEASE ORAL at 20:07

## 2019-12-24 NOTE — PDOC
Exam


Note:


Aren Note:


Please also refer to the separate dictated note~for this date of service 

dictated separately.~Patient seen individually. Discussed the patient with 

Nursing staff reviewed the chart.~Reviewed interim history and current 

functioning. Reviewed vital signs,~Labs/ Radiology~and current medications noted

below. Continue current treatment with the changes noted in the dictated 

addendum note





Assessment:


Vital Signs/I&O:





                                   Vital Signs








  Date Time  Temp Pulse Resp B/P (MAP) Pulse Ox O2 Delivery O2 Flow Rate FiO2


 


12/24/19 10:09  61  131/76    


 


12/24/19 05:27 97.4  18  97 Room Air  














                                    I & O   


 


 12/23/19 12/23/19 12/24/19





 15:00 23:00 07:00


 


Intake Total 700 ml 100 ml 


 


Balance 700 ml 100 ml 











Current Medications:


Meds:





Current Medications








 Medications


  (Trade)  Dose


 Ordered  Sig/Elijah


 Route


 PRN Reason  Start Time


 Stop Time Status Last Admin


Dose Admin


 


 Clozapine


  (Clozaril)  12.5 mg  HS


 PO


   12/24/19 21:00


   UNV 12/24/19 20:17











I have reviewed the current psychotropics carefully including drug interactions.

 Risk benefit ratio favors no change other than as noted in my dictated progress

note.





Diagnosis:


Problems:  


(1) Anxiety disorder


(2) Impulse control disorder


(3) Lewy body dementia with behavioral disturbance


(4) Major neurocognitive disorder, due to vascular disease, with behavioral 

disturbance, mild











MIRIAM JACOBS MD                 Dec 24, 2019 21:26

## 2019-12-25 VITALS — SYSTOLIC BLOOD PRESSURE: 98 MMHG | DIASTOLIC BLOOD PRESSURE: 63 MMHG

## 2019-12-25 VITALS — DIASTOLIC BLOOD PRESSURE: 86 MMHG | SYSTOLIC BLOOD PRESSURE: 128 MMHG

## 2019-12-25 RX ADMIN — METOPROLOL SUCCINATE SCH MG: 50 TABLET, EXTENDED RELEASE ORAL at 08:59

## 2019-12-25 RX ADMIN — TRAZODONE HYDROCHLORIDE SCH MG: 100 TABLET ORAL at 21:14

## 2019-12-25 RX ADMIN — CARBIDOPA AND LEVODOPA SCH TAB: 25; 100 TABLET ORAL at 17:16

## 2019-12-25 RX ADMIN — Medication SCH MG: at 08:59

## 2019-12-25 RX ADMIN — FINASTERIDE SCH MG: 5 TABLET, FILM COATED ORAL at 09:00

## 2019-12-25 RX ADMIN — DIVALPROEX SODIUM SCH MG: 125 CAPSULE, COATED PELLETS ORAL at 21:14

## 2019-12-25 RX ADMIN — ENTACAPONE SCH MG: 200 TABLET, FILM COATED ORAL at 21:13

## 2019-12-25 RX ADMIN — POLYETHYLENE GLYCOL 3350 SCH GM: 17 POWDER, FOR SOLUTION ORAL at 09:00

## 2019-12-25 RX ADMIN — MIRTAZAPINE SCH MG: 15 TABLET, ORALLY DISINTEGRATING ORAL at 21:14

## 2019-12-25 RX ADMIN — CARBIDOPA AND LEVODOPA SCH TAB: 25; 100 TABLET ORAL at 10:00

## 2019-12-25 RX ADMIN — CITALOPRAM HYDROBROMIDE SCH MG: 20 TABLET ORAL at 09:00

## 2019-12-25 RX ADMIN — MEMANTINE HYDROCHLORIDE SCH MG: 10 TABLET ORAL at 21:14

## 2019-12-25 RX ADMIN — Medication SCH MCG: at 08:59

## 2019-12-25 RX ADMIN — MELATONIN TAB 3 MG SCH MG: 3 TAB at 21:14

## 2019-12-25 RX ADMIN — RIVASTIGMINE SCH PATCH: 9.5 PATCH, EXTENDED RELEASE TRANSDERMAL at 09:00

## 2019-12-25 RX ADMIN — MEMANTINE HYDROCHLORIDE SCH MG: 10 TABLET ORAL at 09:00

## 2019-12-25 RX ADMIN — POLYETHYLENE GLYCOL 3350 SCH GM: 17 POWDER, FOR SOLUTION ORAL at 21:13

## 2019-12-25 RX ADMIN — CARBIDOPA AND LEVODOPA SCH TAB: 25; 100 TABLET ORAL at 14:00

## 2019-12-25 RX ADMIN — CARBIDOPA AND LEVODOPA SCH TAB: 25; 100 TABLET ORAL at 21:13

## 2019-12-25 RX ADMIN — MELOXICAM SCH MG: 7.5 TABLET ORAL at 09:00

## 2019-12-25 RX ADMIN — CARBIDOPA AND LEVODOPA SCH TAB.SA: 25; 100 TABLET, EXTENDED RELEASE ORAL at 21:13

## 2019-12-25 RX ADMIN — CARBIDOPA AND LEVODOPA SCH TAB: 25; 100 TABLET ORAL at 06:00

## 2019-12-25 RX ADMIN — ENTACAPONE SCH MG: 200 TABLET, FILM COATED ORAL at 08:59

## 2019-12-25 NOTE — PN
DATE:  12/23/2019



PSYCHIATRIC PROGRESS NOTE



This late entry 12/23/2019 covers elements not covered in my initial note.



SUBJECTIVE:  I met with the patient in the evenings.  Per July RN, the

patient slept 6-3/4 hours previous night.  He did better in the morning trying

to help with nursing staff activities. In the evening, he made a profane gesture

to the nursing staff, had to be in the [facility].



LABORATORY DATA:  We checked CBC, ANC as he is on the Clozaril.  We did initiate

the Clozaril as an atypical antipsychotic to help with his marked psychosis part

of the Lewy body dementia.



REVIEW OF SYSTEMS:  Ambulation impaired at times.  No CV, , pulmonary, eye,

ENT system symptoms on review.  Reliability poor.



MENTAL STATUS EXAM:  Oriented to himself.  Insight, judgment, recent and remote

memory, attention, concentration, fund of knowledge poor, consistent with his

diagnosis.  At times, he is much more oriented than other times again consistent

with Lewy body dementia.



LABORATORY DATA:  Reviewed.



IMPRESSION:  Major neurocognitive disorder, Lewy body with delusion, depression,

behavioral disturbance; anxiety disorder, unspecified; impulse control disorder,

unspecified.  Rest unchanged.



PLAN:  Continue Clozaril 25 mg daily.  Check CBC, ANC.  If these are within

normal limits, we will increase the Clozaril.  Continue rest unchanged for now.





______________________________

MAN BRANDIN JACOBS MD



DR:  ISABELA/georgina  JOB#:  [id number] / [id number]

DD:  12/25/2019 17:33  DT:  12/25/2019 18:32

## 2019-12-25 NOTE — PN
DATE:  12/24/2019



PSYCHIATRIC PROGRESS NOTE



This late entry 12/24/2019 covers elements not covered in my initial note.



SUBJECTIVE:  I met with the patient in the evening.  The patient slept 8-1/4

hours previous night.  He was agitated in the morning, a little more

redirectable at other times.  We will check a CBC, ANC on Thursday as the last

one we had WBC 4.8, neutrophils 53% on Clozaril 25 mg a.m.  We will go ahead and

add Clozaril 12.5 mg at bedtime.



REVIEW OF SYSTEMS:  Ambulation impaired.  No CV, , pulmonary, eye system

symptoms on review.  Reliability poor.



MENTAL STATUS EXAM:  Oriented to himself, more oriented at other times. 

Insight, judgment, recent and remote memory, attention, concentration, fund of

knowledge poor, consistent with his diagnosis mentioned in my initial note.



PLAN:  No change from initial note.  Add the Clozaril 12.5 mg at bedtime.  Rest

unchanged.





______________________________

MAN SLICKNa JACOBS MD



DR:  ISABELA/georgina  JOB#:  [id number] / [id number]

DD:  12/25/2019 17:34  DT:  12/25/2019 18:34

## 2019-12-25 NOTE — PDOC
Exam


Note:


Aren Note:


Please also refer to the separate dictated note~for this date of service 

dictated separately.~Patient seen individually. Discussed the patient with 

Nursing staff reviewed the chart.~Reviewed interim history and current 

functioning. Reviewed vital signs,~Labs/ Radiology~and current medications noted

below. Continue current treatment with the changes noted in the dictated 

addendum note





Assessment:


Vital Signs/I&O:





                                   Vital Signs








  Date Time  Temp Pulse Resp B/P (MAP) Pulse Ox O2 Delivery O2 Flow Rate FiO2


 


12/25/19 15:48 97.8 66 16 98/63 (75) 89   


 


12/25/19 05:49      Room Air  














                                    I & O   


 


 12/24/19 12/24/19 12/25/19





 14:59 22:59 06:59


 


Intake Total 100 ml 360 ml 


 


Balance 100 ml 360 ml 











Current Medications:


Meds:





Current Medications








 Medications


  (Trade)  Dose


 Ordered  Sig/Elijah


 Route


 PRN Reason  Start Time


 Stop Time Status Last Admin


Dose Admin


 


 Clozapine


  (Clozaril)  12.5 mg  HS


 PO


   12/25/19 21:00


    12/25/19 21:13











I have reviewed the current psychotropics carefully including drug interactions.

 Risk benefit ratio favors no change other than as noted in my dictated progress

note.





Diagnosis:


Problems:  


(1) Anxiety disorder


(2) Impulse control disorder


(3) Lewy body dementia with behavioral disturbance


(4) Major neurocognitive disorder, due to vascular disease, with behavioral 

disturbance, mild











MIRIAM JACOBS MD                 Dec 25, 2019 21:45

## 2019-12-26 VITALS — DIASTOLIC BLOOD PRESSURE: 70 MMHG | SYSTOLIC BLOOD PRESSURE: 110 MMHG

## 2019-12-26 VITALS — DIASTOLIC BLOOD PRESSURE: 83 MMHG | SYSTOLIC BLOOD PRESSURE: 150 MMHG

## 2019-12-26 LAB
BASOPHILS # BLD AUTO: 0.1 X10^3/UL (ref 0–0.2)
BASOPHILS NFR BLD: 1 % (ref 0–3)
EOSINOPHIL NFR BLD: 0.6 X10^3/UL (ref 0–0.7)
EOSINOPHIL NFR BLD: 11 % (ref 0–3)
ERYTHROCYTE [DISTWIDTH] IN BLOOD BY AUTOMATED COUNT: 12.9 % (ref 11.5–14.5)
HCT VFR BLD CALC: 42.1 % (ref 39–53)
HGB BLD-MCNC: 14 G/DL (ref 13–17.5)
LYMPHOCYTES # BLD: 1.2 X10^3/UL (ref 1–4.8)
LYMPHOCYTES NFR BLD AUTO: 20 % (ref 24–48)
MCH RBC QN AUTO: 29 PG (ref 25–35)
MCHC RBC AUTO-ENTMCNC: 33 G/DL (ref 31–37)
MCV RBC AUTO: 88 FL (ref 79–100)
MONO #: 0.5 X10^3/UL (ref 0–1.1)
MONOCYTES NFR BLD: 9 % (ref 0–9)
NEUT #: 3.5 X10^3UL (ref 1.8–7.7)
NEUTROPHILS NFR BLD AUTO: 60 % (ref 31–73)
PLATELET # BLD AUTO: 135 X10^3/UL (ref 140–400)
RBC # BLD AUTO: 4.77 X10^6/UL (ref 4.3–5.7)
WBC # BLD AUTO: 5.9 X10^3/UL (ref 4–11)

## 2019-12-26 RX ADMIN — MEMANTINE HYDROCHLORIDE SCH MG: 10 TABLET ORAL at 07:58

## 2019-12-26 RX ADMIN — CARBIDOPA AND LEVODOPA SCH TAB: 25; 100 TABLET ORAL at 11:34

## 2019-12-26 RX ADMIN — FINASTERIDE SCH MG: 5 TABLET, FILM COATED ORAL at 07:59

## 2019-12-26 RX ADMIN — TRAZODONE HYDROCHLORIDE SCH MG: 100 TABLET ORAL at 20:15

## 2019-12-26 RX ADMIN — MIRTAZAPINE SCH MG: 15 TABLET, ORALLY DISINTEGRATING ORAL at 20:17

## 2019-12-26 RX ADMIN — METOPROLOL SUCCINATE SCH MG: 50 TABLET, EXTENDED RELEASE ORAL at 07:58

## 2019-12-26 RX ADMIN — CARBIDOPA AND LEVODOPA SCH TAB: 25; 100 TABLET ORAL at 06:10

## 2019-12-26 RX ADMIN — POLYETHYLENE GLYCOL 3350 SCH GM: 17 POWDER, FOR SOLUTION ORAL at 20:16

## 2019-12-26 RX ADMIN — CHOLECALCIFEROL CAP 1.25 MG (50000 UNIT) SCH UNIT: 1.25 CAP at 07:58

## 2019-12-26 RX ADMIN — POLYETHYLENE GLYCOL 3350 SCH GM: 17 POWDER, FOR SOLUTION ORAL at 07:57

## 2019-12-26 RX ADMIN — CARBIDOPA AND LEVODOPA SCH TAB.SA: 25; 100 TABLET, EXTENDED RELEASE ORAL at 20:15

## 2019-12-26 RX ADMIN — RIVASTIGMINE SCH PATCH: 9.5 PATCH, EXTENDED RELEASE TRANSDERMAL at 07:59

## 2019-12-26 RX ADMIN — MEMANTINE HYDROCHLORIDE SCH MG: 10 TABLET ORAL at 20:15

## 2019-12-26 RX ADMIN — CARBIDOPA AND LEVODOPA SCH TAB: 25; 100 TABLET ORAL at 17:25

## 2019-12-26 RX ADMIN — DIVALPROEX SODIUM SCH MG: 125 CAPSULE, COATED PELLETS ORAL at 20:16

## 2019-12-26 RX ADMIN — Medication SCH MG: at 07:59

## 2019-12-26 RX ADMIN — ENTACAPONE SCH MG: 200 TABLET, FILM COATED ORAL at 20:15

## 2019-12-26 RX ADMIN — MELATONIN TAB 3 MG SCH MG: 3 TAB at 20:16

## 2019-12-26 RX ADMIN — CITALOPRAM HYDROBROMIDE SCH MG: 20 TABLET ORAL at 07:59

## 2019-12-26 RX ADMIN — MELOXICAM SCH MG: 7.5 TABLET ORAL at 07:58

## 2019-12-26 RX ADMIN — CARBIDOPA AND LEVODOPA SCH TAB: 25; 100 TABLET ORAL at 13:43

## 2019-12-26 RX ADMIN — CARBIDOPA AND LEVODOPA SCH TAB: 25; 100 TABLET ORAL at 20:15

## 2019-12-26 RX ADMIN — Medication SCH MCG: at 07:58

## 2019-12-26 RX ADMIN — ENTACAPONE SCH MG: 200 TABLET, FILM COATED ORAL at 07:59

## 2019-12-26 NOTE — PDOC
Exam


Note:


Aren Note:


Please also refer to the separate dictated note~for this date of service 

dictated separately.~Patient seen individually. Discussed the patient with 

Nursing staff reviewed the chart.~Reviewed interim history and current 

functioning. Reviewed vital signs,~Labs/ Radiology~and current medications noted

below. Continue current treatment with the changes noted in the dictated 

addendum note





Assessment:


Vital Signs/I&O:





                                   Vital Signs








  Date Time  Temp Pulse Resp B/P (MAP) Pulse Ox O2 Delivery O2 Flow Rate FiO2


 


12/26/19 15:43 98.5 63 16 110/70 (83) 96   


 


12/25/19 05:49      Room Air  














                                    I & O   


 


 12/25/19 12/25/19 12/26/19





 14:59 22:59 06:59


 


Intake Total 600 ml  


 


Balance 600 ml  








Labs:





                                Laboratory Tests








Test


 12/26/19


07:00


 


White Blood Count


 5.9 x10^3/uL


(4.0-11.0)


 


Red Blood Count


 4.77 x10^6/uL


(4.30-5.70)


 


Hemoglobin


 14.0 g/dL


(13.0-17.5)


 


Hematocrit


 42.1 %


(39.0-53.0)


 


Mean Corpuscular Volume


 88 fL ()





 


Mean Corpuscular Hemoglobin 29 pg (25-35)  


 


Mean Corpuscular Hemoglobin


Concent 33 g/dL


(31-37)


 


Red Cell Distribution Width


 12.9 %


(11.5-14.5)


 


Platelet Count


 135 x10^3/uL


(140-400)  L


 


Neutrophils (%) (Auto) 60 % (31-73)  


 


Lymphocytes (%) (Auto) 20 % (24-48)  L


 


Monocytes (%) (Auto) 9 % (0-9)  


 


Eosinophils (%) (Auto) 11 % (0-3)  H


 


Basophils (%) (Auto) 1 % (0-3)  


 


Neutrophils # (Auto)


 3.5 x10^3uL


(1.8-7.7)


 


Lymphocytes # (Auto)


 1.2 x10^3/uL


(1.0-4.8)


 


Monocytes # (Auto)


 0.5 x10^3/uL


(0.0-1.1)


 


Eosinophils # (Auto)


 0.6 x10^3/uL


(0.0-0.7)


 


Basophils # (Auto)


 0.1 x10^3/uL


(0.0-0.2)











Current Medications:


I have reviewed the current psychotropics carefully including drug interactions.

 Risk benefit ratio favors no change other than as noted in my dictated progress

note.





Diagnosis:


Problems:  


(1) Anxiety disorder


(2) Impulse control disorder


(3) Lewy body dementia with behavioral disturbance


(4) Major neurocognitive disorder, due to vascular disease, with behavioral 

disturbance, mild











MIRIAM JACOBS MD                 Dec 26, 2019 21:07

## 2019-12-27 VITALS — SYSTOLIC BLOOD PRESSURE: 98 MMHG | DIASTOLIC BLOOD PRESSURE: 62 MMHG

## 2019-12-27 VITALS — SYSTOLIC BLOOD PRESSURE: 110 MMHG | DIASTOLIC BLOOD PRESSURE: 60 MMHG

## 2019-12-27 RX ADMIN — MIRTAZAPINE SCH MG: 15 TABLET, ORALLY DISINTEGRATING ORAL at 20:25

## 2019-12-27 RX ADMIN — METOPROLOL SUCCINATE SCH MG: 50 TABLET, EXTENDED RELEASE ORAL at 08:58

## 2019-12-27 RX ADMIN — Medication SCH MG: at 08:57

## 2019-12-27 RX ADMIN — CARBIDOPA AND LEVODOPA SCH TAB: 25; 100 TABLET ORAL at 08:59

## 2019-12-27 RX ADMIN — MEMANTINE HYDROCHLORIDE SCH MG: 10 TABLET ORAL at 20:24

## 2019-12-27 RX ADMIN — POLYETHYLENE GLYCOL 3350 SCH GM: 17 POWDER, FOR SOLUTION ORAL at 08:58

## 2019-12-27 RX ADMIN — POLYETHYLENE GLYCOL 3350 SCH GM: 17 POWDER, FOR SOLUTION ORAL at 20:24

## 2019-12-27 RX ADMIN — MELATONIN TAB 3 MG SCH MG: 3 TAB at 20:25

## 2019-12-27 RX ADMIN — CITALOPRAM HYDROBROMIDE SCH MG: 20 TABLET ORAL at 08:58

## 2019-12-27 RX ADMIN — DIVALPROEX SODIUM SCH MG: 125 CAPSULE, COATED PELLETS ORAL at 20:25

## 2019-12-27 RX ADMIN — CARBIDOPA AND LEVODOPA SCH TAB: 25; 100 TABLET ORAL at 12:43

## 2019-12-27 RX ADMIN — Medication SCH MCG: at 08:58

## 2019-12-27 RX ADMIN — TRAZODONE HYDROCHLORIDE SCH MG: 100 TABLET ORAL at 20:25

## 2019-12-27 RX ADMIN — ENTACAPONE SCH MG: 200 TABLET, FILM COATED ORAL at 08:58

## 2019-12-27 RX ADMIN — CARBIDOPA AND LEVODOPA SCH TAB: 25; 100 TABLET ORAL at 17:37

## 2019-12-27 RX ADMIN — ENTACAPONE SCH MG: 200 TABLET, FILM COATED ORAL at 20:24

## 2019-12-27 RX ADMIN — MEMANTINE HYDROCHLORIDE SCH MG: 10 TABLET ORAL at 08:58

## 2019-12-27 RX ADMIN — CARBIDOPA AND LEVODOPA SCH TAB: 25; 100 TABLET ORAL at 05:37

## 2019-12-27 RX ADMIN — CARBIDOPA AND LEVODOPA SCH TAB: 25; 100 TABLET ORAL at 22:00

## 2019-12-27 RX ADMIN — FINASTERIDE SCH MG: 5 TABLET, FILM COATED ORAL at 08:58

## 2019-12-27 RX ADMIN — RIVASTIGMINE SCH PATCH: 9.5 PATCH, EXTENDED RELEASE TRANSDERMAL at 08:58

## 2019-12-27 RX ADMIN — CARBIDOPA AND LEVODOPA SCH TAB.SA: 25; 100 TABLET, EXTENDED RELEASE ORAL at 20:24

## 2019-12-27 RX ADMIN — MELOXICAM SCH MG: 7.5 TABLET ORAL at 08:58

## 2019-12-27 NOTE — PN
DATE:  12/26/2019



PSYCHIATRIC PROGRESS NOTE



This late entry 12/26/2019 covers elements not covered in my initial note.



SUBJECTIVE:  I met with the patient in the evening of 12/26/2019.  The patient

slept 6-1/2 hours previous night.  He has a significant fall risk.  IM Ativan

was not given on 12/26/2019.



REVIEW OF SYSTEMS:  Ambulation impaired.  No CV, , pulmonary, eye, ENT system

symptoms on review.  Reliability poor.



MENTAL STATUS EXAM:  Oriented to himself.  Insight, judgment, recent and remote

memory, attention, concentration, fund of knowledge poor, consistent with his

diagnosis mentioned in my initial note.



PLAN:  No change from initial note.





______________________________

MAN BRANDIN JACOBS MD



DR:  ISABELA/georgina  JOB#:  [id number] / [id number]

DD:  12/27/2019 10:28  DT:  12/27/2019 12:25

## 2019-12-27 NOTE — PDOC
Exam


Note:


Aren Note:


Please also refer to the separate dictated note~for this date of service 

dictated separately.~Patient seen individually. Discussed the patient with 

Nursing staff reviewed the chart.~Reviewed interim history and current 

functioning. Reviewed vital signs,~Labs/ Radiology~and current medications noted

below. Continue current treatment with the changes noted in the dictated 

addendum note





Assessment:


Vital Signs/I&O:





                                   Vital Signs








  Date Time  Temp Pulse Resp B/P (MAP) Pulse Ox O2 Delivery O2 Flow Rate FiO2


 


12/27/19 15:43 98.5 83 18 98/62 (74) 96   


 


12/27/19 10:45      Room Air  














                                    I & O   


 


 12/26/19 12/26/19 12/27/19





 15:00 23:00 07:00


 


Intake Total 720 ml  600 ml


 


Balance 720 ml  600 ml











Current Medications:


I have reviewed the current psychotropics carefully including drug interactions.

 Risk benefit ratio favors no change other than as noted in my dictated progress

note.





Diagnosis:


Problems:  


(1) Anxiety disorder


(2) Impulse control disorder


(3) Lewy body dementia with behavioral disturbance


(4) Major neurocognitive disorder, due to vascular disease, with behavioral 

disturbance, mild











MIRIAM JACOBS MD                 Dec 27, 2019 21:28

## 2019-12-27 NOTE — PN
DATE:  12/25/2019



PSYCHIATRIC PROGRESS NOTE



This late entry 12/25/2019 covers the elements not covered in my initial note.



SUBJECTIVE:  I met with the patient in the evening and had been called by the

nursing staff off and on during the day.  The patient slept for three-quarter

hours previous night.  He remains confused, intermittently agitated, restless,

paranoid at times.  He is much more confused at certain times than others

consistent with his Lewy body dementia diagnosis.



REVIEW OF SYSTEMS:  Ambulation impaired.  No CV, , pulmonary, eye, ENT system

symptoms on review.  Reliability poor.



MENTAL STATUS EXAM:  Oriented to himself.  Insight, judgment, recent and remote

memory, attention, concentration, fund of knowledge poor, consistent with his

diagnosis.



IMPRESSION:  Major neurocognitive disorder, Lewy body with delusion, depression,

behavioral disturbance; anxiety disorder, unspecified; impulse control disorder,

unspecified.



PLAN:  Continue psychotropics from initial note.  Clozaril will be increased to

12.5 mg at bedtime, maintain 25 mg a.m.  CBC, ANC unremarkable.  We will

continue to monitor this weekly.  Maintain the Exelon patch, Remeron, IM Ativan,

which we may discontinue in a day or two and he is on Sinemet and Comtan for his

Parkinson's, trazodone 100 mg at bedtime, Depakote Sprinkles, Nuplazid,

melatonin, Celexa, Remeron.





______________________________

MAN BRANDIN JACOBS MD



DR:  ISABELA/georgina  JOB#:  [id number] / [id number]

DD:  12/27/2019 10:26  DT:  12/27/2019 12:49

## 2019-12-28 VITALS — SYSTOLIC BLOOD PRESSURE: 94 MMHG | DIASTOLIC BLOOD PRESSURE: 56 MMHG

## 2019-12-28 VITALS — DIASTOLIC BLOOD PRESSURE: 76 MMHG | SYSTOLIC BLOOD PRESSURE: 127 MMHG

## 2019-12-28 VITALS — SYSTOLIC BLOOD PRESSURE: 143 MMHG | DIASTOLIC BLOOD PRESSURE: 81 MMHG

## 2019-12-28 RX ADMIN — DIVALPROEX SODIUM SCH MG: 125 CAPSULE, COATED PELLETS ORAL at 19:32

## 2019-12-28 RX ADMIN — FINASTERIDE SCH MG: 5 TABLET, FILM COATED ORAL at 08:34

## 2019-12-28 RX ADMIN — CARBIDOPA AND LEVODOPA SCH TAB.SA: 25; 100 TABLET, EXTENDED RELEASE ORAL at 19:33

## 2019-12-28 RX ADMIN — MEMANTINE HYDROCHLORIDE SCH MG: 10 TABLET ORAL at 19:33

## 2019-12-28 RX ADMIN — CARBIDOPA AND LEVODOPA SCH TAB: 25; 100 TABLET ORAL at 14:42

## 2019-12-28 RX ADMIN — MELATONIN TAB 3 MG SCH MG: 3 TAB at 19:33

## 2019-12-28 RX ADMIN — MIRTAZAPINE SCH MG: 15 TABLET, ORALLY DISINTEGRATING ORAL at 19:33

## 2019-12-28 RX ADMIN — CITALOPRAM HYDROBROMIDE SCH MG: 20 TABLET ORAL at 08:39

## 2019-12-28 RX ADMIN — CARBIDOPA AND LEVODOPA SCH TAB: 25; 100 TABLET ORAL at 11:14

## 2019-12-28 RX ADMIN — MELOXICAM SCH MG: 7.5 TABLET ORAL at 08:34

## 2019-12-28 RX ADMIN — CARBIDOPA AND LEVODOPA SCH TAB: 25; 100 TABLET ORAL at 06:00

## 2019-12-28 RX ADMIN — POLYETHYLENE GLYCOL 3350 SCH GM: 17 POWDER, FOR SOLUTION ORAL at 19:33

## 2019-12-28 RX ADMIN — METOPROLOL SUCCINATE SCH MG: 50 TABLET, EXTENDED RELEASE ORAL at 08:38

## 2019-12-28 RX ADMIN — RIVASTIGMINE SCH PATCH: 9.5 PATCH, EXTENDED RELEASE TRANSDERMAL at 08:33

## 2019-12-28 RX ADMIN — CARBIDOPA AND LEVODOPA SCH TAB: 25; 100 TABLET ORAL at 18:06

## 2019-12-28 RX ADMIN — MEMANTINE HYDROCHLORIDE SCH MG: 10 TABLET ORAL at 08:33

## 2019-12-28 RX ADMIN — POLYETHYLENE GLYCOL 3350 SCH GM: 17 POWDER, FOR SOLUTION ORAL at 08:40

## 2019-12-28 RX ADMIN — CARBIDOPA AND LEVODOPA SCH TAB: 25; 100 TABLET ORAL at 22:00

## 2019-12-28 RX ADMIN — Medication SCH MG: at 08:40

## 2019-12-28 RX ADMIN — TRAZODONE HYDROCHLORIDE SCH MG: 100 TABLET ORAL at 19:33

## 2019-12-28 RX ADMIN — Medication SCH MCG: at 08:33

## 2019-12-28 RX ADMIN — ENTACAPONE SCH MG: 200 TABLET, FILM COATED ORAL at 19:32

## 2019-12-28 RX ADMIN — ENTACAPONE SCH MG: 200 TABLET, FILM COATED ORAL at 08:39

## 2019-12-28 NOTE — PN
DATE:  12/28/2019



SUBJECTIVE:  The patient was seen today, met with the staff, chart reviewed. 

Staff reports some improvement in his behavior, still restless, agitated easily.

 The patient also has been verbally threatening and abusive towards staff.



OBSERVATION:

VITAL SIGNS:  Temperature 97.4, blood pressure 143/81, pulse 58, respirations

20, O2 sat 96%.

GENERAL:  Slept about 8 hours last night.  The patient's appetite is fair.



MEDICATIONS:  Reviewed.  Currently on clozapine 12.5 mg at night, 25 mg daily,

lorazepam 1 mg daily, Depakote 1000 mg at night, Exelon patch daily, Namenda 10

mg b.i.d., mirtazapine 7.5 mg at night, trazodone 100 mg at night p.r.n. for

sleep, and Celexa 20 mg daily.  The patient is also on carbidopa/levodopa for

Parkinson's.  The patient currently is not showing any major side effects at

this time.



ASSESSMENT:

1.  Dementia, Lewy body type with behavior problems.

2.  Generalized anxiety disorder.

3.  Impulse control disorder, unspecified.



PLAN:  To continue with the treatment.



LENGTH OF STAY:  5 days.





______________________________

CAITLYN GUEVARA MD DR:  CULLEN/georgina  JOB#:  [id number] / [id number]

DD:  12/28/2019 12:27  DT:  12/28/2019 12:50

## 2019-12-29 VITALS — DIASTOLIC BLOOD PRESSURE: 63 MMHG | SYSTOLIC BLOOD PRESSURE: 100 MMHG

## 2019-12-29 VITALS — DIASTOLIC BLOOD PRESSURE: 77 MMHG | SYSTOLIC BLOOD PRESSURE: 117 MMHG

## 2019-12-29 RX ADMIN — DIVALPROEX SODIUM SCH MG: 125 CAPSULE, COATED PELLETS ORAL at 20:55

## 2019-12-29 RX ADMIN — MEMANTINE HYDROCHLORIDE SCH MG: 10 TABLET ORAL at 20:52

## 2019-12-29 RX ADMIN — TRAZODONE HYDROCHLORIDE SCH MG: 100 TABLET ORAL at 20:55

## 2019-12-29 RX ADMIN — METOPROLOL SUCCINATE SCH MG: 50 TABLET, EXTENDED RELEASE ORAL at 09:19

## 2019-12-29 RX ADMIN — CARBIDOPA AND LEVODOPA SCH TAB: 25; 100 TABLET ORAL at 17:41

## 2019-12-29 RX ADMIN — RIVASTIGMINE SCH PATCH: 9.5 PATCH, EXTENDED RELEASE TRANSDERMAL at 09:21

## 2019-12-29 RX ADMIN — POLYETHYLENE GLYCOL 3350 SCH GM: 17 POWDER, FOR SOLUTION ORAL at 09:21

## 2019-12-29 RX ADMIN — Medication SCH MCG: at 09:21

## 2019-12-29 RX ADMIN — CARBIDOPA AND LEVODOPA SCH TAB: 25; 100 TABLET ORAL at 13:33

## 2019-12-29 RX ADMIN — MELATONIN TAB 3 MG SCH MG: 3 TAB at 20:54

## 2019-12-29 RX ADMIN — CITALOPRAM HYDROBROMIDE SCH MG: 20 TABLET ORAL at 09:21

## 2019-12-29 RX ADMIN — ENTACAPONE SCH MG: 200 TABLET, FILM COATED ORAL at 20:53

## 2019-12-29 RX ADMIN — CARBIDOPA AND LEVODOPA SCH TAB: 25; 100 TABLET ORAL at 20:53

## 2019-12-29 RX ADMIN — CARBIDOPA AND LEVODOPA SCH TAB: 25; 100 TABLET ORAL at 05:48

## 2019-12-29 RX ADMIN — CARBIDOPA AND LEVODOPA SCH TAB: 25; 100 TABLET ORAL at 09:20

## 2019-12-29 RX ADMIN — MELOXICAM SCH MG: 7.5 TABLET ORAL at 09:20

## 2019-12-29 RX ADMIN — POLYETHYLENE GLYCOL 3350 SCH GM: 17 POWDER, FOR SOLUTION ORAL at 20:53

## 2019-12-29 RX ADMIN — MIRTAZAPINE SCH MG: 15 TABLET, ORALLY DISINTEGRATING ORAL at 20:53

## 2019-12-29 RX ADMIN — MEMANTINE HYDROCHLORIDE SCH MG: 10 TABLET ORAL at 09:21

## 2019-12-29 RX ADMIN — ENTACAPONE SCH MG: 200 TABLET, FILM COATED ORAL at 09:20

## 2019-12-29 RX ADMIN — FINASTERIDE SCH MG: 5 TABLET, FILM COATED ORAL at 09:19

## 2019-12-29 RX ADMIN — Medication SCH MG: at 09:19

## 2019-12-29 RX ADMIN — CARBIDOPA AND LEVODOPA SCH TAB.SA: 25; 100 TABLET, EXTENDED RELEASE ORAL at 20:52

## 2019-12-29 NOTE — PN
DATE:  12/29/2019



SUBJECTIVE:  The patient was seen today, met with the staff, chart reviewed. 

The patient continues to have behavior problems, restlessness, agitated easily

and also verbally threatening towards the staff at times.  The patient has been

compliant with the treatment, taking his medications regularly.



OBSERVATION:

VITAL SIGNS:  Temperature 98.2, blood pressure 117/77, pulse 75, respirations

16, O2 sat 98%.

GENERAL:  Slept about 7 hours last night.  The patient's appetite is fair.  The

patient is not having any physical complaints.



CURRENT MEDICATIONS:  Include olanzapine 12.5 mg at night and 25 mg daily,

lorazepam 1 mg daily, Depakote 1000 mg at night, Exelon patch daily, Namenda 10

mg b.i.d., mirtazapine 7.5 mg at night, trazodone 100 mg at night p.r.n. for

sleep and Celexa 20 mg daily.  The patient is also taking carbidopa/levodopa for

Parkinson's disease.  The patient is not exhibiting any major side effects to

medications.



ASSESSMENT:

1.  Dementia, most likely Lewy body type with behavior problems.

2.  Generalized anxiety disorder.

3.  Impulse control disorder, unspecified.



PLAN:  To continue with the treatment.



LENGTH OF STAY:  4-5 days.





______________________________

CAITLYN GUEVARA MD DR:  CULLEN/georgina  JOB#:  [id number] / [id number]

DD:  12/29/2019 12:47  DT:  12/29/2019 21:16

## 2019-12-30 VITALS — SYSTOLIC BLOOD PRESSURE: 94 MMHG | DIASTOLIC BLOOD PRESSURE: 63 MMHG

## 2019-12-30 VITALS — DIASTOLIC BLOOD PRESSURE: 86 MMHG | SYSTOLIC BLOOD PRESSURE: 134 MMHG

## 2019-12-30 LAB
ALBUMIN SERPL-MCNC: 2.8 G/DL (ref 3.4–5)
ALBUMIN/GLOB SERPL: 1 {RATIO} (ref 1–1.7)
ALP SERPL-CCNC: 67 U/L (ref 46–116)
ALT SERPL-CCNC: 13 U/L (ref 16–63)
ANCA AB SER QL IF: (no result) TITER
ANION GAP SERPL CALC-SCNC: 3 MMOL/L (ref 6–14)
AST SERPL-CCNC: 13 U/L (ref 15–37)
BASOPHILS # BLD AUTO: 0 X10^3/UL (ref 0–0.2)
BASOPHILS NFR BLD: 1 % (ref 0–3)
BILIRUB SERPL-MCNC: 0.2 MG/DL (ref 0.2–1)
BUN/CREAT SERPL: 38 (ref 6–20)
CA-I SERPL ISE-MCNC: 34 MG/DL (ref 8–26)
CALCIUM SERPL-MCNC: 8.6 MG/DL (ref 8.5–10.1)
CHLORIDE SERPL-SCNC: 109 MMOL/L (ref 98–107)
CO2 SERPL-SCNC: 33 MMOL/L (ref 21–32)
CREAT SERPL-MCNC: 0.9 MG/DL (ref 0.7–1.3)
EOSINOPHIL NFR BLD: 0.5 X10^3/UL (ref 0–0.7)
EOSINOPHIL NFR BLD: 8 % (ref 0–3)
ERYTHROCYTE [DISTWIDTH] IN BLOOD BY AUTOMATED COUNT: 13.2 % (ref 11.5–14.5)
GFR SERPLBLD BASED ON 1.73 SQ M-ARVRAT: 84.2 ML/MIN
GLOBULIN SER-MCNC: 2.7 G/DL (ref 2.2–3.8)
GLUCOSE SERPL-MCNC: 77 MG/DL (ref 70–99)
HCT VFR BLD CALC: 34.9 % (ref 39–53)
HGB BLD-MCNC: 12 G/DL (ref 13–17.5)
LYMPHOCYTES # BLD: 1.4 X10^3/UL (ref 1–4.8)
LYMPHOCYTES NFR BLD AUTO: 23 % (ref 24–48)
MCH RBC QN AUTO: 30 PG (ref 25–35)
MCHC RBC AUTO-ENTMCNC: 34 G/DL (ref 31–37)
MCV RBC AUTO: 86 FL (ref 79–100)
MONO #: 0.5 X10^3/UL (ref 0–1.1)
MONOCYTES NFR BLD: 8 % (ref 0–9)
NEUT #: 3.8 X10^3UL (ref 1.8–7.7)
NEUTROPHILS NFR BLD AUTO: 61 % (ref 31–73)
P ANCA: (no result) TITER
PLATELET # BLD AUTO: 124 X10^3/UL (ref 140–400)
POTASSIUM SERPL-SCNC: 4.5 MMOL/L (ref 3.5–5.1)
PROT SERPL-MCNC: 5.5 G/DL (ref 6.4–8.2)
RBC # BLD AUTO: 4.06 X10^6/UL (ref 4.3–5.7)
SODIUM SERPL-SCNC: 145 MMOL/L (ref 136–145)
WBC # BLD AUTO: 6.3 X10^3/UL (ref 4–11)

## 2019-12-30 RX ADMIN — DIVALPROEX SODIUM SCH MG: 125 CAPSULE, COATED PELLETS ORAL at 20:29

## 2019-12-30 RX ADMIN — MELOXICAM SCH MG: 7.5 TABLET ORAL at 08:52

## 2019-12-30 RX ADMIN — FINASTERIDE SCH MG: 5 TABLET, FILM COATED ORAL at 08:51

## 2019-12-30 RX ADMIN — Medication SCH MCG: at 08:51

## 2019-12-30 RX ADMIN — ENTACAPONE SCH MG: 200 TABLET, FILM COATED ORAL at 20:29

## 2019-12-30 RX ADMIN — MEMANTINE HYDROCHLORIDE SCH MG: 10 TABLET ORAL at 08:51

## 2019-12-30 RX ADMIN — MIRTAZAPINE SCH MG: 15 TABLET, ORALLY DISINTEGRATING ORAL at 20:30

## 2019-12-30 RX ADMIN — CARBIDOPA AND LEVODOPA SCH TAB: 25; 100 TABLET ORAL at 09:57

## 2019-12-30 RX ADMIN — METOPROLOL SUCCINATE SCH MG: 50 TABLET, EXTENDED RELEASE ORAL at 08:51

## 2019-12-30 RX ADMIN — Medication SCH MG: at 08:53

## 2019-12-30 RX ADMIN — POLYETHYLENE GLYCOL 3350 SCH GM: 17 POWDER, FOR SOLUTION ORAL at 20:30

## 2019-12-30 RX ADMIN — TRAZODONE HYDROCHLORIDE SCH MG: 100 TABLET ORAL at 20:31

## 2019-12-30 RX ADMIN — CARBIDOPA AND LEVODOPA SCH TAB: 25; 100 TABLET ORAL at 17:45

## 2019-12-30 RX ADMIN — CARBIDOPA AND LEVODOPA SCH TAB: 25; 100 TABLET ORAL at 14:36

## 2019-12-30 RX ADMIN — RIVASTIGMINE SCH PATCH: 9.5 PATCH, EXTENDED RELEASE TRANSDERMAL at 08:52

## 2019-12-30 RX ADMIN — CITALOPRAM HYDROBROMIDE SCH MG: 20 TABLET ORAL at 08:51

## 2019-12-30 RX ADMIN — ENTACAPONE SCH MG: 200 TABLET, FILM COATED ORAL at 08:51

## 2019-12-30 RX ADMIN — CARBIDOPA AND LEVODOPA SCH TAB: 25; 100 TABLET ORAL at 20:31

## 2019-12-30 RX ADMIN — POLYETHYLENE GLYCOL 3350 SCH GM: 17 POWDER, FOR SOLUTION ORAL at 08:51

## 2019-12-30 RX ADMIN — CARBIDOPA AND LEVODOPA SCH TAB.SA: 25; 100 TABLET, EXTENDED RELEASE ORAL at 20:30

## 2019-12-30 RX ADMIN — CARBIDOPA AND LEVODOPA SCH TAB: 25; 100 TABLET ORAL at 06:29

## 2019-12-30 RX ADMIN — MEMANTINE HYDROCHLORIDE SCH MG: 10 TABLET ORAL at 20:31

## 2019-12-30 RX ADMIN — MELATONIN TAB 3 MG SCH MG: 3 TAB at 20:30

## 2019-12-30 NOTE — PDOC
Exam


Note:


Aren Note:


Please also refer to the separate dictated note~for this date of service 

dictated separately.~Patient seen individually. Discussed the patient with 

Nursing staff reviewed the chart.~Reviewed interim history and current 

functioning. Reviewed vital signs,~Labs/ Radiology~and current medications noted

below. Continue current treatment with the changes noted in the dictated 

addendum note





Assessment:


Vital Signs/I&O:





                                   Vital Signs








  Date Time  Temp Pulse Resp B/P (MAP) Pulse Ox O2 Delivery O2 Flow Rate FiO2


 


12/30/19 15:51 98.3 71 16 94/63 (73) 96   


 


12/28/19 05:43      Room Air  














                                    I & O   


 


 12/29/19 12/29/19 12/30/19





 15:00 23:00 07:00


 


Intake Total 480 ml 480 ml 


 


Balance 480 ml 480 ml 








Labs:





                                Laboratory Tests








Test


 12/30/19


06:44


 


White Blood Count


 6.3 x10^3/uL


(4.0-11.0)


 


Red Blood Count


 4.06 x10^6/uL


(4.30-5.70)  L


 


Hemoglobin


 12.0 g/dL


(13.0-17.5)  L


 


Hematocrit


 34.9 %


(39.0-53.0)  L


 


Mean Corpuscular Volume


 86 fL ()





 


Mean Corpuscular Hemoglobin 30 pg (25-35)  


 


Mean Corpuscular Hemoglobin


Concent 34 g/dL


(31-37)


 


Red Cell Distribution Width


 13.2 %


(11.5-14.5)


 


Platelet Count


 124 x10^3/uL


(140-400)  L


 


Neutrophils (%) (Auto) 61 % (31-73)  


 


Lymphocytes (%) (Auto) 23 % (24-48)  L


 


Monocytes (%) (Auto) 8 % (0-9)  


 


Eosinophils (%) (Auto) 8 % (0-3)  H


 


Basophils (%) (Auto) 1 % (0-3)  


 


Neutrophils # (Auto)


 3.8 x10^3uL


(1.8-7.7)


 


Lymphocytes # (Auto)


 1.4 x10^3/uL


(1.0-4.8)


 


Monocytes # (Auto)


 0.5 x10^3/uL


(0.0-1.1)


 


Eosinophils # (Auto)


 0.5 x10^3/uL


(0.0-0.7)


 


Basophils # (Auto)


 0.0 x10^3/uL


(0.0-0.2)


 


Sodium Level


 145 mmol/L


(136-145)


 


Potassium Level


 4.5 mmol/L


(3.5-5.1)


 


Chloride Level


 109 mmol/L


()  H


 


Carbon Dioxide Level


 33 mmol/L


(21-32)  H


 


Anion Gap 3 (6-14)  L


 


Blood Urea Nitrogen


 34 mg/dL


(8-26)  H


 


Creatinine


 0.9 mg/dL


(0.7-1.3)


 


Estimated GFR


(Cockcroft-Gault) 84.2  





 


BUN/Creatinine Ratio 38 (6-20)  H


 


Glucose Level


 77 mg/dL


(70-99)


 


Calcium Level


 8.6 mg/dL


(8.5-10.1)


 


Total Bilirubin


 0.2 mg/dL


(0.2-1.0)


 


Aspartate Amino Transferase


(AST) 13 U/L (15-37)


L


 


Alanine Aminotransferase (ALT)


 13 U/L (16-63)


L


 


Alkaline Phosphatase


 67 U/L


()


 


Total Protein


 5.5 g/dL


(6.4-8.2)  L


 


Albumin


 2.8 g/dL


(3.4-5.0)  L


 


Albumin/Globulin Ratio 1.0 (1.0-1.7)  











Current Medications:


Meds:





Current Medications








 Medications


  (Trade)  Dose


 Ordered  Sig/Elijah


 Route


 PRN Reason  Start Time


 Stop Time Status Last Admin


Dose Admin


 


 Clozapine


  (Clozaril)  25 mg  HS


 PO


   12/30/19 21:00


    12/30/19 20:30











I have reviewed the current psychotropics carefully including drug interactions.

 Risk benefit ratio favors no change other than as noted in my dictated progress

note.





Diagnosis:


Problems:  


(1) Anxiety disorder


(2) Impulse control disorder


(3) Lewy body dementia with behavioral disturbance


(4) Major neurocognitive disorder, due to vascular disease, with behavioral 

disturbance, mild











MIRIAM JACOBS MD                 Dec 30, 2019 21:10

## 2019-12-30 NOTE — PN
DATE:  12/27/2019



PSYCHIATRIC PROGRESS NOTE



This late entry of 12/27 covers elements not covered in my initial note.



SUBJECTIVE:  I met with the patient on the evening of 12/27, staffed at a

treatment team meeting with the entire team earlier in the day.  The patient

slept 5-3/4 hours the previous night.  He refused his vitamins in the morning,

refused some of his medications the previous night, but somewhat calmer during

the day on 12/27.



REVIEW OF SYSTEMS:  Ambulation somewhat impaired, unsteady gait.  No CV, ,

pulmonary, eye, ENT system symptoms on review.  Reliability poor.



MENTAL STATUS EXAMINATION:  Oriented to himself.  Insight, judgment, recent and

remote memory, attention, concentration, fund of knowledge poor; consistent with

his diagnosis.



IMPRESSION:  Major neurocognitive disorder, Lewy body with delusion, depression,

behavioral disturbance.  Rest unchanged.



PLAN:  The patient is doing somewhat better.  Maintain Clozaril at current

dosage together with the rest of the psychotropics.  We will stop the IM Ativan

for now.





______________________________

MIRIAM JACOBS MD



DR:  ISABELA/georgina  JOB#:  [id number] / [id number]

DD:  12/30/2019 12:49  DT:  12/30/2019 21:28

## 2019-12-31 VITALS — SYSTOLIC BLOOD PRESSURE: 134 MMHG | DIASTOLIC BLOOD PRESSURE: 86 MMHG

## 2019-12-31 VITALS — DIASTOLIC BLOOD PRESSURE: 80 MMHG | SYSTOLIC BLOOD PRESSURE: 132 MMHG

## 2019-12-31 RX ADMIN — CARBIDOPA AND LEVODOPA SCH TAB.SA: 25; 100 TABLET, EXTENDED RELEASE ORAL at 20:08

## 2019-12-31 RX ADMIN — MEMANTINE HYDROCHLORIDE SCH MG: 10 TABLET ORAL at 20:09

## 2019-12-31 RX ADMIN — CITALOPRAM HYDROBROMIDE SCH MG: 20 TABLET ORAL at 09:24

## 2019-12-31 RX ADMIN — CARBIDOPA AND LEVODOPA SCH TAB: 25; 100 TABLET ORAL at 20:13

## 2019-12-31 RX ADMIN — POLYETHYLENE GLYCOL 3350 SCH GM: 17 POWDER, FOR SOLUTION ORAL at 20:11

## 2019-12-31 RX ADMIN — METOPROLOL SUCCINATE SCH MG: 50 TABLET, EXTENDED RELEASE ORAL at 09:23

## 2019-12-31 RX ADMIN — RIVASTIGMINE SCH PATCH: 9.5 PATCH, EXTENDED RELEASE TRANSDERMAL at 09:24

## 2019-12-31 RX ADMIN — Medication SCH MG: at 09:24

## 2019-12-31 RX ADMIN — DIVALPROEX SODIUM SCH MG: 125 CAPSULE, COATED PELLETS ORAL at 20:09

## 2019-12-31 RX ADMIN — ENTACAPONE SCH MG: 200 TABLET, FILM COATED ORAL at 20:10

## 2019-12-31 RX ADMIN — MEMANTINE HYDROCHLORIDE SCH MG: 10 TABLET ORAL at 09:23

## 2019-12-31 RX ADMIN — ENTACAPONE SCH MG: 200 TABLET, FILM COATED ORAL at 09:23

## 2019-12-31 RX ADMIN — CARBIDOPA AND LEVODOPA SCH TAB: 25; 100 TABLET ORAL at 10:06

## 2019-12-31 RX ADMIN — MIRTAZAPINE SCH MG: 15 TABLET, ORALLY DISINTEGRATING ORAL at 20:09

## 2019-12-31 RX ADMIN — CARBIDOPA AND LEVODOPA SCH TAB: 25; 100 TABLET ORAL at 05:52

## 2019-12-31 RX ADMIN — TRAZODONE HYDROCHLORIDE SCH MG: 100 TABLET ORAL at 20:09

## 2019-12-31 RX ADMIN — MELATONIN TAB 3 MG SCH MG: 3 TAB at 20:09

## 2019-12-31 RX ADMIN — Medication SCH MCG: at 09:23

## 2019-12-31 RX ADMIN — FINASTERIDE SCH MG: 5 TABLET, FILM COATED ORAL at 09:22

## 2019-12-31 RX ADMIN — POLYETHYLENE GLYCOL 3350 SCH GM: 17 POWDER, FOR SOLUTION ORAL at 09:00

## 2019-12-31 RX ADMIN — CARBIDOPA AND LEVODOPA SCH TAB: 25; 100 TABLET ORAL at 15:16

## 2019-12-31 RX ADMIN — CARBIDOPA AND LEVODOPA SCH TAB: 25; 100 TABLET ORAL at 17:40

## 2019-12-31 RX ADMIN — MELOXICAM SCH MG: 7.5 TABLET ORAL at 09:24

## 2019-12-31 NOTE — PDOC
Exam


Note:


Aren Note:


Please also refer to the separate dictated note~for this date of service 

dictated separately.~Patient seen individually. Discussed the patient with 

Nursing staff reviewed the chart.~Reviewed interim history and current 

functioning. Reviewed vital signs,~Labs/ Radiology~and current medications noted

below. Continue current treatment with the changes noted in the dictated 

addendum note





Assessment:


Vital Signs/I&O:





                                   Vital Signs








  Date Time  Temp Pulse Resp B/P (MAP) Pulse Ox O2 Delivery O2 Flow Rate FiO2


 


12/31/19 16:14 97.7 64 16 134/86 (102) 99   


 


12/28/19 05:43      Room Air  














                                    I & O   


 


 12/30/19 12/30/19 12/31/19





 15:00 23:00 07:00


 


Intake Total 900 ml 600 ml 


 


Balance 900 ml 600 ml 











Current Medications:


Meds:





Current Medications








 Medications


  (Trade)  Dose


 Ordered  Sig/Elijah


 Route


 PRN Reason  Start Time


 Stop Time Status Last Admin


Dose Admin


 


 Clozapine


  (Clozaril)  37.5 mg  HS


 PO


   12/31/19 21:00


    12/31/19 20:11











I have reviewed the current psychotropics carefully including drug interactions.

 Risk benefit ratio favors no change other than as noted in my dictated progress

note.





Diagnosis:


Problems:  


(1) Anxiety disorder


(2) Impulse control disorder


(3) Lewy body dementia with behavioral disturbance


(4) Major neurocognitive disorder, due to vascular disease, with behavioral 

disturbance, mild











MIRIAM JACOBS MD                 Dec 31, 2019 21:28

## 2020-01-01 VITALS — DIASTOLIC BLOOD PRESSURE: 48 MMHG | SYSTOLIC BLOOD PRESSURE: 146 MMHG

## 2020-01-01 VITALS — SYSTOLIC BLOOD PRESSURE: 127 MMHG | DIASTOLIC BLOOD PRESSURE: 76 MMHG

## 2020-01-01 RX ADMIN — CARBIDOPA AND LEVODOPA SCH TAB: 25; 100 TABLET ORAL at 17:14

## 2020-01-01 RX ADMIN — MELATONIN TAB 3 MG SCH MG: 3 TAB at 19:40

## 2020-01-01 RX ADMIN — ENTACAPONE SCH MG: 200 TABLET, FILM COATED ORAL at 19:40

## 2020-01-01 RX ADMIN — CARBIDOPA AND LEVODOPA SCH TAB.SA: 25; 100 TABLET, EXTENDED RELEASE ORAL at 19:41

## 2020-01-01 RX ADMIN — RIVASTIGMINE SCH PATCH: 9.5 PATCH, EXTENDED RELEASE TRANSDERMAL at 08:19

## 2020-01-01 RX ADMIN — CARBIDOPA AND LEVODOPA SCH TAB: 25; 100 TABLET ORAL at 12:03

## 2020-01-01 RX ADMIN — TRAZODONE HYDROCHLORIDE SCH MG: 100 TABLET ORAL at 19:40

## 2020-01-01 RX ADMIN — TRAZODONE HYDROCHLORIDE PRN MG: 100 TABLET ORAL at 21:25

## 2020-01-01 RX ADMIN — Medication SCH MCG: at 08:20

## 2020-01-01 RX ADMIN — POLYETHYLENE GLYCOL 3350 SCH GM: 17 POWDER, FOR SOLUTION ORAL at 08:19

## 2020-01-01 RX ADMIN — CARBIDOPA AND LEVODOPA SCH TAB: 25; 100 TABLET ORAL at 05:28

## 2020-01-01 RX ADMIN — CARBIDOPA AND LEVODOPA SCH TAB: 25; 100 TABLET ORAL at 21:12

## 2020-01-01 RX ADMIN — DIVALPROEX SODIUM SCH MG: 125 CAPSULE, COATED PELLETS ORAL at 19:40

## 2020-01-01 RX ADMIN — CITALOPRAM HYDROBROMIDE SCH MG: 20 TABLET ORAL at 08:18

## 2020-01-01 RX ADMIN — POLYETHYLENE GLYCOL 3350 SCH GM: 17 POWDER, FOR SOLUTION ORAL at 19:41

## 2020-01-01 RX ADMIN — MELOXICAM SCH MG: 7.5 TABLET ORAL at 08:19

## 2020-01-01 RX ADMIN — MEMANTINE HYDROCHLORIDE SCH MG: 10 TABLET ORAL at 08:19

## 2020-01-01 RX ADMIN — FINASTERIDE SCH MG: 5 TABLET, FILM COATED ORAL at 08:19

## 2020-01-01 RX ADMIN — MEMANTINE HYDROCHLORIDE SCH MG: 10 TABLET ORAL at 19:41

## 2020-01-01 RX ADMIN — Medication SCH MG: at 08:17

## 2020-01-01 RX ADMIN — METOPROLOL SUCCINATE SCH MG: 50 TABLET, EXTENDED RELEASE ORAL at 08:19

## 2020-01-01 RX ADMIN — MIRTAZAPINE SCH MG: 15 TABLET, ORALLY DISINTEGRATING ORAL at 19:40

## 2020-01-01 RX ADMIN — ENTACAPONE SCH MG: 200 TABLET, FILM COATED ORAL at 08:19

## 2020-01-01 RX ADMIN — CARBIDOPA AND LEVODOPA SCH TAB: 25; 100 TABLET ORAL at 14:00

## 2020-01-01 NOTE — PDOC
Exam


Note:


Aren Note:


Please also refer to the separate dictated note~for this date of service 

dictated separately.~Patient seen individually. Discussed the patient with 

Nursing staff reviewed the chart.~Reviewed interim history and current 

functioning. Reviewed vital signs,~Labs/ Radiology~and current medications noted

below. Continue current treatment with the changes noted in the dictated 

addendum note





Assessment:


Vital Signs/I&O:





                                   Vital Signs








  Date Time  Temp Pulse Resp B/P (MAP) Pulse Ox O2 Delivery O2 Flow Rate FiO2


 


1/1/20 15:41 98.4 61 16 127/76 (93) 98   


 


1/1/20 05:11      Room Air  














                                    I & O   


 


 12/31/19 12/31/19 1/1/20





 15:00 23:00 07:00


 


Intake Total 240 ml 0 ml 


 


Balance 240 ml 0 ml 











Current Medications:


Meds:





Current Medications








 Medications


  (Trade)  Dose


 Ordered  Sig/Elijah


 Route


 PRN Reason  Start Time


 Stop Time Status Last Admin


Dose Admin


 


 Clozapine


  (Clozaril)  37.5 mg  HS


 PO


   12/31/19 21:00


    1/1/20 19:40











I have reviewed the current psychotropics carefully including drug interactions.

 Risk benefit ratio favors no change other than as noted in my dictated progress

note.





Diagnosis:


Problems:  


(1) Anxiety disorder


(2) Impulse control disorder


(3) Lewy body dementia with behavioral disturbance


(4) Major neurocognitive disorder, due to vascular disease, with behavioral 

disturbance, mild











MIRIAM JACOBS MD                  Jan 1, 2020 20:50

## 2020-01-02 VITALS — DIASTOLIC BLOOD PRESSURE: 85 MMHG | SYSTOLIC BLOOD PRESSURE: 137 MMHG

## 2020-01-02 VITALS — SYSTOLIC BLOOD PRESSURE: 151 MMHG | DIASTOLIC BLOOD PRESSURE: 81 MMHG

## 2020-01-02 RX ADMIN — RIVASTIGMINE SCH PATCH: 9.5 PATCH, EXTENDED RELEASE TRANSDERMAL at 08:51

## 2020-01-02 RX ADMIN — ENTACAPONE SCH MG: 200 TABLET, FILM COATED ORAL at 08:51

## 2020-01-02 RX ADMIN — MELATONIN TAB 3 MG SCH MG: 3 TAB at 19:59

## 2020-01-02 RX ADMIN — MELOXICAM SCH MG: 7.5 TABLET ORAL at 08:50

## 2020-01-02 RX ADMIN — FINASTERIDE SCH MG: 5 TABLET, FILM COATED ORAL at 08:48

## 2020-01-02 RX ADMIN — CARBIDOPA AND LEVODOPA SCH TAB: 25; 100 TABLET ORAL at 17:05

## 2020-01-02 RX ADMIN — CARBIDOPA AND LEVODOPA SCH TAB: 25; 100 TABLET ORAL at 21:29

## 2020-01-02 RX ADMIN — MEMANTINE HYDROCHLORIDE SCH MG: 10 TABLET ORAL at 19:58

## 2020-01-02 RX ADMIN — CHOLECALCIFEROL CAP 1.25 MG (50000 UNIT) SCH UNIT: 1.25 CAP at 08:50

## 2020-01-02 RX ADMIN — DIVALPROEX SODIUM SCH MG: 125 CAPSULE, COATED PELLETS ORAL at 19:58

## 2020-01-02 RX ADMIN — METOPROLOL SUCCINATE SCH MG: 50 TABLET, EXTENDED RELEASE ORAL at 08:50

## 2020-01-02 RX ADMIN — POLYETHYLENE GLYCOL 3350 SCH GM: 17 POWDER, FOR SOLUTION ORAL at 08:50

## 2020-01-02 RX ADMIN — CARBIDOPA AND LEVODOPA SCH TAB.SA: 25; 100 TABLET, EXTENDED RELEASE ORAL at 19:59

## 2020-01-02 RX ADMIN — MIRTAZAPINE SCH MG: 15 TABLET, ORALLY DISINTEGRATING ORAL at 19:59

## 2020-01-02 RX ADMIN — POLYETHYLENE GLYCOL 3350 SCH GM: 17 POWDER, FOR SOLUTION ORAL at 19:58

## 2020-01-02 RX ADMIN — CITALOPRAM HYDROBROMIDE SCH MG: 20 TABLET ORAL at 08:50

## 2020-01-02 RX ADMIN — ENTACAPONE SCH MG: 200 TABLET, FILM COATED ORAL at 19:59

## 2020-01-02 RX ADMIN — CARBIDOPA AND LEVODOPA SCH TAB: 25; 100 TABLET ORAL at 05:46

## 2020-01-02 RX ADMIN — TRAZODONE HYDROCHLORIDE SCH MG: 100 TABLET ORAL at 19:58

## 2020-01-02 RX ADMIN — CARBIDOPA AND LEVODOPA SCH TAB: 25; 100 TABLET ORAL at 08:50

## 2020-01-02 RX ADMIN — Medication SCH MG: at 08:51

## 2020-01-02 RX ADMIN — CARBIDOPA AND LEVODOPA SCH TAB: 25; 100 TABLET ORAL at 14:00

## 2020-01-02 RX ADMIN — Medication SCH MCG: at 08:51

## 2020-01-02 RX ADMIN — MEMANTINE HYDROCHLORIDE SCH MG: 10 TABLET ORAL at 08:50

## 2020-01-02 NOTE — PDOC
Exam


Note:


Aren Note:


Please also refer to the separate dictated note~for this date of service 

dictated separately.~Patient seen individually. Discussed the patient with 

Nursing staff reviewed the chart.~Reviewed interim history and current 

functioning. Reviewed vital signs,~Labs/ Radiology~and current medications noted

below. Continue current treatment with the changes noted in the dictated 

addendum note





Assessment:


Vital Signs/I&O:





                                   Vital Signs








  Date Time  Temp Pulse Resp B/P (MAP) Pulse Ox O2 Delivery O2 Flow Rate FiO2


 


1/2/20 16:20 97.8 61 18 137/85 (102) 95 Room Air  














                                    I & O   


 


 1/1/20 1/1/20 1/2/20





 14:59 22:59 06:59


 


Intake Total 720 ml 480 ml 240 ml


 


Balance 720 ml 480 ml 240 ml











Current Medications:


I have reviewed the current psychotropics carefully including drug interactions.

 Risk benefit ratio favors no change other than as noted in my dictated progress

note.





Diagnosis:


Problems:  


(1) Anxiety disorder


(2) Impulse control disorder


(3) Lewy body dementia with behavioral disturbance


(4) Major neurocognitive disorder, due to vascular disease, with behavioral 

disturbance, mild











MIRIAM JACOBS MD                  Jan 2, 2020 21:10

## 2020-01-03 VITALS — DIASTOLIC BLOOD PRESSURE: 77 MMHG | SYSTOLIC BLOOD PRESSURE: 158 MMHG

## 2020-01-03 VITALS — DIASTOLIC BLOOD PRESSURE: 71 MMHG | SYSTOLIC BLOOD PRESSURE: 126 MMHG

## 2020-01-03 RX ADMIN — Medication SCH MG: at 08:24

## 2020-01-03 RX ADMIN — ENTACAPONE SCH MG: 200 TABLET, FILM COATED ORAL at 08:29

## 2020-01-03 RX ADMIN — MEMANTINE HYDROCHLORIDE SCH MG: 10 TABLET ORAL at 19:42

## 2020-01-03 RX ADMIN — TRAZODONE HYDROCHLORIDE SCH MG: 100 TABLET ORAL at 19:42

## 2020-01-03 RX ADMIN — MELATONIN TAB 3 MG SCH MG: 3 TAB at 19:42

## 2020-01-03 RX ADMIN — CARBIDOPA AND LEVODOPA SCH TAB: 25; 100 TABLET ORAL at 23:45

## 2020-01-03 RX ADMIN — FINASTERIDE SCH MG: 5 TABLET, FILM COATED ORAL at 08:26

## 2020-01-03 RX ADMIN — POLYETHYLENE GLYCOL 3350 SCH GM: 17 POWDER, FOR SOLUTION ORAL at 08:25

## 2020-01-03 RX ADMIN — MELOXICAM SCH MG: 7.5 TABLET ORAL at 08:30

## 2020-01-03 RX ADMIN — CARBIDOPA AND LEVODOPA SCH TAB.SA: 25; 100 TABLET, EXTENDED RELEASE ORAL at 19:42

## 2020-01-03 RX ADMIN — MIRTAZAPINE SCH MG: 15 TABLET, ORALLY DISINTEGRATING ORAL at 19:42

## 2020-01-03 RX ADMIN — CARBIDOPA AND LEVODOPA SCH TAB: 25; 100 TABLET ORAL at 12:37

## 2020-01-03 RX ADMIN — RIVASTIGMINE SCH PATCH: 9.5 PATCH, EXTENDED RELEASE TRANSDERMAL at 08:25

## 2020-01-03 RX ADMIN — MEMANTINE HYDROCHLORIDE SCH MG: 10 TABLET ORAL at 08:28

## 2020-01-03 RX ADMIN — METOPROLOL SUCCINATE SCH MG: 50 TABLET, EXTENDED RELEASE ORAL at 08:25

## 2020-01-03 RX ADMIN — ENTACAPONE SCH MG: 200 TABLET, FILM COATED ORAL at 19:41

## 2020-01-03 RX ADMIN — Medication SCH MCG: at 08:29

## 2020-01-03 RX ADMIN — POLYETHYLENE GLYCOL 3350 SCH GM: 17 POWDER, FOR SOLUTION ORAL at 19:42

## 2020-01-03 RX ADMIN — TRAZODONE HYDROCHLORIDE PRN MG: 100 TABLET ORAL at 23:44

## 2020-01-03 RX ADMIN — CARBIDOPA AND LEVODOPA SCH TAB: 25; 100 TABLET ORAL at 17:10

## 2020-01-03 RX ADMIN — CARBIDOPA AND LEVODOPA SCH TAB: 25; 100 TABLET ORAL at 08:27

## 2020-01-03 RX ADMIN — CARBIDOPA AND LEVODOPA SCH TAB: 25; 100 TABLET ORAL at 06:00

## 2020-01-03 RX ADMIN — CITALOPRAM HYDROBROMIDE SCH MG: 20 TABLET ORAL at 08:28

## 2020-01-03 NOTE — PN
DATE:  01/01/2020



PSYCHIATRIC PROGRESS NOTE



This late entry 01/01/2020 covers elements not covered in my initial note.



SUBJECTIVE:  I met with the patient evening of 01/01/2020.  Discussed with

TARIK Jacinto.  The patient slept 7-1/4 hours previous night.  In the morning, he

was restless, anxious, and disorganized.  Received Zyprexa p.r.n., did much

better with this.  At lunchtime, he was trying to lie on the floor, but

redirected.  No IM Ativan was given.



REVIEW OF SYSTEMS:  No CV, , pulmonary, eye, ENT system symptoms on review. 

Reliability poor.



MENTAL STATUS EXAM:  Oriented to himself, situation, and at times, he is

cognitively quite intact.  At other times quite confused, consistent with his

Lewy body dementia.  No CV, , pulmonary, eye system symptoms on review.



MENTAL STATUS EXAM:  Oriented as above.  Abstraction fair, computation impaired,

language function intact, attention span short.  Mood and affect, lability is

improved.



LABORATORY DATA:  Reviewed.



IMPRESSION:  Major neurocognitive disorder, Lewy body with delusion, depression,

behavioral disturbance.  Rest unchanged.



PLAN:  No change from initial note.  Continue psychotropics mentioned in my

initial note.  May need to increase Clozaril further.





______________________________

MIRIAM JACOBS MD



DR:  ISABELA/georgina  JOB#:  [id number] / [id number]

DD:  01/03/2020 10:09  DT:  01/03/2020 11:00

## 2020-01-03 NOTE — PN
DATE:  12/31/2019



PSYCHIATRIC PROGRESS NOTE



This late entry 12/31/2019 covers elements not covered in my initial note.



SUBJECTIVE:  Per TARIK Castro, the patient has had a difficult day.  At 7:00

o'clock in the morning, he was swinging at staff, beating up the doors and

windows and received Ativan IM and then did a little better.  Received Zyprexa

p.r.n. as well was in the day room for one hour.



REVIEW OF SYSTEMS:  No CV, , pulmonary, eye, ENT system symptoms on review. 

Reliability poor.



MENTAL STATUS EXAM:  Oriented to himself.  Insight, judgment, recent and remote

memory, attention, concentration, fund of knowledge poor, consistent with his

diagnosis.



IMPRESSION:  Major neurocognitive disorder, Lewy body with delusion, depression,

behavioral disturbance.  Rest unchanged.



PLAN:  Increase Clozaril to 25 mg daily, 37.5 mg p.o. at bedtime.  Maintain

Sinemet, Ativan IM daily, Nuplazid, Comtan, trazodone, Depakote, Celexa.  Rest

unchanged.





______________________________

MAN BRANDIN JACOBS MD



DR:  ISABELA/georgina  JOB#:  [id number] / [id number]

DD:  01/02/2020 21:37  DT:  01/03/2020 01:54

## 2020-01-03 NOTE — PN
DATE:  01/02/2020



This late entry 01/02/2020 covers elements not covered in my initial note.



SUBJECTIVE:  I met with the patient evening of 01/02/2020 and discussed with

TARIK Sanford.  The patient slept for 3/4 hours previous night.  He slept through

breakfast and then had breakfast and slept after that as well.  Gait is

shuffling.  He was agitated at one point grabbing at nursing staff, received IM

Ativan.



REVIEW OF SYSTEMS:  No CV, , pulmonary, eye, ENT system symptoms on review. 

Reliability poor.



MENTAL STATUS EXAM:  Oriented to himself, situation at times and other times

quite confused.  Insight, judgment, recent and remote memory, attention,

concentration, fund of knowledge poor, consistent with his diagnosis mentioned

in my initial note.



IMPRESSION:  Major neurocognitive disorder, Lewy body with delusion, depression,

behavioral disturbance.  Rest unchanged.



PLAN:  No change from initial note.





______________________________

MAN BRANDIN JACOBS MD



DR:  ISABELA/georgina  JOB#:  [id number] / [id number]

DD:  01/03/2020 10:10  DT:  01/03/2020 10:21

## 2020-01-03 NOTE — PDOC
Exam


Note:


Aren Note:


Please also refer to the separate dictated note~for this date of service 

dictated separately.~Patient seen individually. Discussed the patient with 

Nursing staff reviewed the chart.~Reviewed interim history and current 

functioning. Reviewed vital signs,~Labs/ Radiology~and current medications noted

below. Continue current treatment with the changes noted in the dictated 

addendum note





Assessment:


Vital Signs/I&O:





                                   Vital Signs








  Date Time  Temp Pulse Resp B/P (MAP) Pulse Ox O2 Delivery O2 Flow Rate FiO2


 


1/3/20 15:55 98.0 77 18 126/71 (89) 97   


 


1/3/20 06:11      Room Air  














                                    I & O   


 


 1/2/20 1/2/20 1/3/20





 15:00 23:00 07:00


 


Intake Total 480 ml 360 ml 


 


Balance 480 ml 360 ml 











Current Medications:


I have reviewed the current psychotropics carefully including drug interactions.

 Risk benefit ratio favors no change other than as noted in my dictated progress

note.





Diagnosis:


Problems:  


(1) Anxiety disorder


(2) Impulse control disorder


(3) Lewy body dementia with behavioral disturbance


(4) Major neurocognitive disorder, due to vascular disease, with behavioral 

disturbance, mild











MIRIAM JACOBS MD                  Bryn 3, 2020 20:57

## 2020-01-03 NOTE — PN
DATE:  12/30/2019



PSYCHIATRIC PROGRESS NOTE



This late entry 12/30/2019 covers elements not covered in my initial note.  Per

TARIK Castro, the patient slept 5-3/4 hours previous night.



SUBJECTIVE:  He was pleasant in the morning, compliant, but by the afternoon, he

was feisty per nursing report and later drowsy.  Absolute neutrophil count is

3843.



REVIEW OF SYSTEMS:  Ambulation impaired.  No CV, , pulmonary, eye, ENT system

symptoms on review.  Reliability poor.



MENTAL STATUS EXAM:  Oriented to himself.  Insight, judgment, recent and remote

memory, attention, concentration, fund of knowledge poor, consistent with his

diagnosis.



IMPRESSION:  Major neurocognitive disorder, Lewy body with delusion, depression,

behavioral disturbance.  Rest unchanged.



PLAN:  Increase Clozaril from 37.5 mg a day to 50 mg a day.  Continue rest

unchanged.





______________________________

MAN BRANDIN JACOBS MD



DR:  ISABELA/georgina  JOB#:  [id number] / [id number]

DD:  01/02/2020 21:11  DT:  01/03/2020 01:01

## 2020-01-04 VITALS — SYSTOLIC BLOOD PRESSURE: 149 MMHG | DIASTOLIC BLOOD PRESSURE: 81 MMHG

## 2020-01-04 RX ADMIN — Medication SCH MCG: at 09:49

## 2020-01-04 RX ADMIN — MELATONIN TAB 3 MG SCH MG: 3 TAB at 21:06

## 2020-01-04 RX ADMIN — CARBIDOPA AND LEVODOPA SCH TAB: 25; 100 TABLET ORAL at 16:48

## 2020-01-04 RX ADMIN — CARBIDOPA AND LEVODOPA SCH TAB: 25; 100 TABLET ORAL at 14:00

## 2020-01-04 RX ADMIN — CITALOPRAM HYDROBROMIDE SCH MG: 20 TABLET ORAL at 09:50

## 2020-01-04 RX ADMIN — CARBIDOPA AND LEVODOPA SCH TAB: 25; 100 TABLET ORAL at 09:53

## 2020-01-04 RX ADMIN — CARBIDOPA AND LEVODOPA SCH TAB: 25; 100 TABLET ORAL at 06:01

## 2020-01-04 RX ADMIN — ENTACAPONE SCH MG: 200 TABLET, FILM COATED ORAL at 21:05

## 2020-01-04 RX ADMIN — MIRTAZAPINE SCH MG: 15 TABLET, ORALLY DISINTEGRATING ORAL at 21:06

## 2020-01-04 RX ADMIN — Medication SCH MG: at 09:51

## 2020-01-04 RX ADMIN — MEMANTINE HYDROCHLORIDE SCH MG: 10 TABLET ORAL at 09:49

## 2020-01-04 RX ADMIN — POLYETHYLENE GLYCOL 3350 SCH GM: 17 POWDER, FOR SOLUTION ORAL at 09:50

## 2020-01-04 RX ADMIN — METOPROLOL SUCCINATE SCH MG: 50 TABLET, EXTENDED RELEASE ORAL at 09:50

## 2020-01-04 RX ADMIN — FINASTERIDE SCH MG: 5 TABLET, FILM COATED ORAL at 09:49

## 2020-01-04 RX ADMIN — MELOXICAM SCH MG: 7.5 TABLET ORAL at 09:49

## 2020-01-04 RX ADMIN — RIVASTIGMINE SCH PATCH: 13.3 PATCH, EXTENDED RELEASE TRANSDERMAL at 09:51

## 2020-01-04 RX ADMIN — TRAZODONE HYDROCHLORIDE SCH MG: 100 TABLET ORAL at 21:06

## 2020-01-04 RX ADMIN — MEMANTINE HYDROCHLORIDE SCH MG: 10 TABLET ORAL at 21:06

## 2020-01-04 RX ADMIN — CARBIDOPA AND LEVODOPA SCH TAB: 25; 100 TABLET ORAL at 21:07

## 2020-01-04 RX ADMIN — CARBIDOPA AND LEVODOPA SCH TAB.SA: 25; 100 TABLET, EXTENDED RELEASE ORAL at 21:06

## 2020-01-04 RX ADMIN — POLYETHYLENE GLYCOL 3350 SCH GM: 17 POWDER, FOR SOLUTION ORAL at 21:07

## 2020-01-04 RX ADMIN — ENTACAPONE SCH MG: 200 TABLET, FILM COATED ORAL at 09:49

## 2020-01-04 NOTE — PDOC
Exam


Note:


Aren Note:


Please also refer to the separate dictated note~for this date of service 

dictated separately.~Patient seen individually. Discussed the patient with 

Nursing staff reviewed the chart.~Reviewed interim history and current 

functioning. Reviewed vital signs,~Labs/ Radiology~and current medications noted

below. Continue current treatment with the changes noted in the dictated 

addendum note





Assessment:


Vital Signs/I&O:





                                   Vital Signs








  Date Time  Temp Pulse Resp B/P (MAP) Pulse Ox O2 Delivery O2 Flow Rate FiO2


 


1/4/20 09:50  64  149/81    


 


1/4/20 04:54 97.7  16  96   


 


1/3/20 06:11      Room Air  














                                    I & O   


 


 1/3/20 1/3/20 1/4/20





 15:00 23:00 07:00


 


Intake Total 480 ml 480 ml 


 


Balance 480 ml 480 ml 











Current Medications:


Meds:





Current Medications








 Medications


  (Trade)  Dose


 Ordered  Sig/Elijah


 Route


 PRN Reason  Start Time


 Stop Time Status Last Admin


Dose Admin


 


 Rivastigmine


  (Exelon 13.3mg)  1 patch  DAILY


 TD


   1/4/20 09:00


    1/4/20 09:51











I have reviewed the current psychotropics carefully including drug interactions.

 Risk benefit ratio favors no change other than as noted in my dictated progress

note.





Diagnosis:


Problems:  


(1) Anxiety disorder


(2) Impulse control disorder


(3) Lewy body dementia with behavioral disturbance


(4) Major neurocognitive disorder, due to vascular disease, with behavioral 

disturbance, mild











MIRIAM JACOBS MD                  Jan 4, 2020 21:21

## 2020-01-05 VITALS — DIASTOLIC BLOOD PRESSURE: 91 MMHG | SYSTOLIC BLOOD PRESSURE: 163 MMHG

## 2020-01-05 RX ADMIN — CARBIDOPA AND LEVODOPA SCH TAB: 25; 100 TABLET ORAL at 17:03

## 2020-01-05 RX ADMIN — CARBIDOPA AND LEVODOPA SCH TAB: 25; 100 TABLET ORAL at 20:00

## 2020-01-05 RX ADMIN — CARBIDOPA AND LEVODOPA SCH TAB.SA: 25; 100 TABLET, EXTENDED RELEASE ORAL at 20:00

## 2020-01-05 RX ADMIN — TRAZODONE HYDROCHLORIDE SCH MG: 100 TABLET ORAL at 20:00

## 2020-01-05 RX ADMIN — Medication SCH MG: at 08:28

## 2020-01-05 RX ADMIN — MELOXICAM SCH MG: 7.5 TABLET ORAL at 08:29

## 2020-01-05 RX ADMIN — ENTACAPONE SCH MG: 200 TABLET, FILM COATED ORAL at 08:30

## 2020-01-05 RX ADMIN — MIRTAZAPINE SCH MG: 15 TABLET, ORALLY DISINTEGRATING ORAL at 20:00

## 2020-01-05 RX ADMIN — MEMANTINE HYDROCHLORIDE SCH MG: 10 TABLET ORAL at 08:31

## 2020-01-05 RX ADMIN — MEMANTINE HYDROCHLORIDE SCH MG: 10 TABLET ORAL at 20:00

## 2020-01-05 RX ADMIN — MELATONIN TAB 3 MG SCH MG: 3 TAB at 20:00

## 2020-01-05 RX ADMIN — METOPROLOL SUCCINATE SCH MG: 50 TABLET, EXTENDED RELEASE ORAL at 08:31

## 2020-01-05 RX ADMIN — CARBIDOPA AND LEVODOPA SCH TAB: 25; 100 TABLET ORAL at 05:14

## 2020-01-05 RX ADMIN — CITALOPRAM HYDROBROMIDE SCH MG: 20 TABLET ORAL at 08:30

## 2020-01-05 RX ADMIN — FINASTERIDE SCH MG: 5 TABLET, FILM COATED ORAL at 08:31

## 2020-01-05 RX ADMIN — CARBIDOPA AND LEVODOPA SCH TAB: 25; 100 TABLET ORAL at 13:05

## 2020-01-05 RX ADMIN — RIVASTIGMINE SCH PATCH: 13.3 PATCH, EXTENDED RELEASE TRANSDERMAL at 08:30

## 2020-01-05 RX ADMIN — Medication SCH MCG: at 08:31

## 2020-01-05 RX ADMIN — ENTACAPONE SCH MG: 200 TABLET, FILM COATED ORAL at 20:00

## 2020-01-05 RX ADMIN — POLYETHYLENE GLYCOL 3350 SCH GM: 17 POWDER, FOR SOLUTION ORAL at 20:00

## 2020-01-05 RX ADMIN — CARBIDOPA AND LEVODOPA SCH TAB: 25; 100 TABLET ORAL at 10:02

## 2020-01-05 RX ADMIN — POLYETHYLENE GLYCOL 3350 SCH GM: 17 POWDER, FOR SOLUTION ORAL at 08:30

## 2020-01-05 NOTE — PDOC
Exam


Note:


Aren Note:


Please also refer to the separate dictated note~for this date of service 

dictated separately.~Patient seen individually. Discussed the patient with 

Nursing staff reviewed the chart.~Reviewed interim history and current 

functioning. Reviewed vital signs,~Labs/ Radiology~and current medications noted

below. Continue current treatment with the changes noted in the dictated 

addendum note





Assessment:


Vital Signs/I&O:





                                   Vital Signs








  Date Time  Temp Pulse Resp B/P (MAP) Pulse Ox O2 Delivery O2 Flow Rate FiO2


 


1/5/20 08:31  60  163/91    


 


1/5/20 06:17 97.0  18  98   


 


1/3/20 06:11      Room Air  














                                    I & O   


 


 1/4/20 1/4/20 1/5/20





 14:59 22:59 06:59


 


Intake Total 720 ml  


 


Balance 720 ml  











Current Medications:


I have reviewed the current psychotropics carefully including drug interactions.

 Risk benefit ratio favors no change other than as noted in my dictated progress

note.





Diagnosis:


Problems:  


(1) Anxiety disorder


(2) Impulse control disorder


(3) Lewy body dementia with behavioral disturbance


(4) Major neurocognitive disorder, due to vascular disease, with behavioral 

disturbance, mild











MIRIAM JACOBS MD                  Jan 5, 2020 20:42

## 2020-01-06 VITALS — SYSTOLIC BLOOD PRESSURE: 176 MMHG | DIASTOLIC BLOOD PRESSURE: 94 MMHG

## 2020-01-06 VITALS — DIASTOLIC BLOOD PRESSURE: 86 MMHG | SYSTOLIC BLOOD PRESSURE: 143 MMHG

## 2020-01-06 LAB
ALBUMIN SERPL-MCNC: 3 G/DL (ref 3.4–5)
ALBUMIN/GLOB SERPL: 1.1 {RATIO} (ref 1–1.7)
ALP SERPL-CCNC: 65 U/L (ref 46–116)
ALT SERPL-CCNC: 19 U/L (ref 16–63)
ANION GAP SERPL CALC-SCNC: 3 MMOL/L (ref 6–14)
AST SERPL-CCNC: 16 U/L (ref 15–37)
BASOPHILS # BLD AUTO: 0 X10^3/UL (ref 0–0.2)
BASOPHILS NFR BLD: 1 % (ref 0–3)
BILIRUB SERPL-MCNC: 0.3 MG/DL (ref 0.2–1)
BUN/CREAT SERPL: 38 (ref 6–20)
CA-I SERPL ISE-MCNC: 38 MG/DL (ref 8–26)
CALCIUM SERPL-MCNC: 8.2 MG/DL (ref 8.5–10.1)
CHLORIDE SERPL-SCNC: 110 MMOL/L (ref 98–107)
CO2 SERPL-SCNC: 32 MMOL/L (ref 21–32)
CREAT SERPL-MCNC: 1 MG/DL (ref 0.7–1.3)
EOSINOPHIL NFR BLD: 0.9 X10^3/UL (ref 0–0.7)
EOSINOPHIL NFR BLD: 17 % (ref 0–3)
ERYTHROCYTE [DISTWIDTH] IN BLOOD BY AUTOMATED COUNT: 13.4 % (ref 11.5–14.5)
GFR SERPLBLD BASED ON 1.73 SQ M-ARVRAT: 74.5 ML/MIN
GLOBULIN SER-MCNC: 2.7 G/DL (ref 2.2–3.8)
GLUCOSE SERPL-MCNC: 84 MG/DL (ref 70–99)
HCT VFR BLD CALC: 36.7 % (ref 39–53)
HGB BLD-MCNC: 12.2 G/DL (ref 13–17.5)
LYMPHOCYTES # BLD: 0.9 X10^3/UL (ref 1–4.8)
LYMPHOCYTES NFR BLD AUTO: 18 % (ref 24–48)
MAGNESIUM SERPL-MCNC: 2 MG/DL (ref 1.8–2.4)
MCH RBC QN AUTO: 30 PG (ref 25–35)
MCHC RBC AUTO-ENTMCNC: 33 G/DL (ref 31–37)
MCV RBC AUTO: 89 FL (ref 79–100)
MONO #: 0.5 X10^3/UL (ref 0–1.1)
MONOCYTES NFR BLD: 10 % (ref 0–9)
NEUT #: 2.7 X10^3UL (ref 1.8–7.7)
NEUTROPHILS NFR BLD AUTO: 54 % (ref 31–73)
PLATELET # BLD AUTO: 130 X10^3/UL (ref 140–400)
POTASSIUM SERPL-SCNC: 4 MMOL/L (ref 3.5–5.1)
PROT SERPL-MCNC: 5.7 G/DL (ref 6.4–8.2)
RBC # BLD AUTO: 4.14 X10^6/UL (ref 4.3–5.7)
SODIUM SERPL-SCNC: 145 MMOL/L (ref 136–145)
WBC # BLD AUTO: 5 X10^3/UL (ref 4–11)

## 2020-01-06 RX ADMIN — ENTACAPONE SCH MG: 200 TABLET, FILM COATED ORAL at 08:09

## 2020-01-06 RX ADMIN — POLYETHYLENE GLYCOL 3350 SCH GM: 17 POWDER, FOR SOLUTION ORAL at 19:38

## 2020-01-06 RX ADMIN — METOPROLOL SUCCINATE SCH MG: 50 TABLET, EXTENDED RELEASE ORAL at 08:10

## 2020-01-06 RX ADMIN — MEMANTINE HYDROCHLORIDE SCH MG: 10 TABLET ORAL at 19:36

## 2020-01-06 RX ADMIN — CARBIDOPA AND LEVODOPA SCH TAB: 25; 100 TABLET ORAL at 05:41

## 2020-01-06 RX ADMIN — CARBIDOPA AND LEVODOPA SCH TAB: 25; 100 TABLET ORAL at 12:18

## 2020-01-06 RX ADMIN — MELATONIN TAB 3 MG SCH MG: 3 TAB at 19:37

## 2020-01-06 RX ADMIN — Medication SCH MG: at 08:08

## 2020-01-06 RX ADMIN — MIRTAZAPINE SCH MG: 15 TABLET, ORALLY DISINTEGRATING ORAL at 19:37

## 2020-01-06 RX ADMIN — CARBIDOPA AND LEVODOPA SCH TAB: 25; 100 TABLET ORAL at 17:23

## 2020-01-06 RX ADMIN — Medication SCH MCG: at 08:11

## 2020-01-06 RX ADMIN — RIVASTIGMINE SCH PATCH: 13.3 PATCH, EXTENDED RELEASE TRANSDERMAL at 08:11

## 2020-01-06 RX ADMIN — MEMANTINE HYDROCHLORIDE SCH MG: 10 TABLET ORAL at 08:10

## 2020-01-06 RX ADMIN — CITALOPRAM HYDROBROMIDE SCH MG: 20 TABLET ORAL at 08:09

## 2020-01-06 RX ADMIN — POLYETHYLENE GLYCOL 3350 SCH GM: 17 POWDER, FOR SOLUTION ORAL at 08:10

## 2020-01-06 RX ADMIN — CARBIDOPA AND LEVODOPA SCH TAB: 25; 100 TABLET ORAL at 15:36

## 2020-01-06 RX ADMIN — CARBIDOPA AND LEVODOPA SCH TAB: 25; 100 TABLET ORAL at 22:00

## 2020-01-06 RX ADMIN — TRAZODONE HYDROCHLORIDE SCH MG: 100 TABLET ORAL at 19:37

## 2020-01-06 RX ADMIN — CARBIDOPA AND LEVODOPA SCH TAB.SA: 25; 100 TABLET, EXTENDED RELEASE ORAL at 19:36

## 2020-01-06 RX ADMIN — FINASTERIDE SCH MG: 5 TABLET, FILM COATED ORAL at 08:10

## 2020-01-06 RX ADMIN — CARBIDOPA AND LEVODOPA SCH TAB: 25; 100 TABLET ORAL at 14:00

## 2020-01-06 RX ADMIN — MELOXICAM SCH MG: 7.5 TABLET ORAL at 08:10

## 2020-01-06 RX ADMIN — ENTACAPONE SCH MG: 200 TABLET, FILM COATED ORAL at 19:37

## 2020-01-06 NOTE — PN
DATE:  01/04/2020



PSYCHIATRIC PROGRESS NOTE.



This late entry of 01/04/2020 covers the elements not covered in my initial

note.



SUBJECTIVE:  I met with the patient in the evening of 01/04/2020.  The patient

slept 6-1/2 hours previous night.  He has been intermittently combative,

agitated through with things and left aggressive oconnor on TARIK [place].  Later, he

was quite apologetic.  He continues to be a fall risk.  Received Ativan at 7:30

and 10:00 p.m. for his anxiety, agitation.



REVIEW OF SYSTEMS:  Ambulation impaired.  No CV, , pulmonary, eye system

symptoms on review.



MENTAL STATUS EXAM:  Oriented to himself, situation at times, other times not so

much.  Insight, judgment, recent and remote memory, attention, concentration,

fund of knowledge poor, consistent with his diagnosis mentioned in my initial

note.



IMPRESSION:  Unchanged from initial note.



PLAN:  No change from initial note.





______________________________

MIRIAM JACOBS MD



DR:  ISABELA/georgina  JOB#:  [id number] / [id number]

DD:  01/05/2020 21:12  DT:  01/06/2020 00:41

## 2020-01-06 NOTE — PDOC
Exam


Note:


Aren Note:


Please also refer to the separate dictated note~for this date of service 

dictated separately.~Patient seen individually. Discussed the patient with 

Nursing staff reviewed the chart.~Reviewed interim history and current 

functioning. Reviewed vital signs,~Labs/ Radiology~and current medications noted

below. Continue current treatment with the changes noted in the dictated 

addendum note





Assessment:


Vital Signs/I&O:





                                   Vital Signs








  Date Time  Temp Pulse Resp B/P (MAP) Pulse Ox O2 Delivery O2 Flow Rate FiO2


 


1/6/20 15:51 98.3 66 18 143/86 (105) 99   


 


1/3/20 06:11      Room Air  














                                    I & O   


 


 1/5/20 1/5/20 1/6/20





 15:00 23:00 07:00


 


Intake Total 960 ml 720 ml 


 


Balance 960 ml 720 ml 








Labs:





                                Laboratory Tests








Test


 1/6/20


06:20


 


White Blood Count


 5.0 x10^3/uL


(4.0-11.0)


 


Red Blood Count


 4.14 x10^6/uL


(4.30-5.70)  L


 


Hemoglobin


 12.2 g/dL


(13.0-17.5)  L


 


Hematocrit


 36.7 %


(39.0-53.0)  L


 


Mean Corpuscular Volume


 89 fL ()





 


Mean Corpuscular Hemoglobin 30 pg (25-35)  


 


Mean Corpuscular Hemoglobin


Concent 33 g/dL


(31-37)


 


Red Cell Distribution Width


 13.4 %


(11.5-14.5)


 


Platelet Count


 130 x10^3/uL


(140-400)  L


 


Neutrophils (%) (Auto) 54 % (31-73)  


 


Lymphocytes (%) (Auto) 18 % (24-48)  L


 


Monocytes (%) (Auto) 10 % (0-9)  H


 


Eosinophils (%) (Auto) 17 % (0-3)  H


 


Basophils (%) (Auto) 1 % (0-3)  


 


Neutrophils # (Auto)


 2.7 x10^3uL


(1.8-7.7)


 


Lymphocytes # (Auto)


 0.9 x10^3/uL


(1.0-4.8)  L


 


Monocytes # (Auto)


 0.5 x10^3/uL


(0.0-1.1)


 


Eosinophils # (Auto)


 0.9 x10^3/uL


(0.0-0.7)  H


 


Basophils # (Auto)


 0.0 x10^3/uL


(0.0-0.2)


 


Sodium Level


 145 mmol/L


(136-145)


 


Potassium Level


 4.0 mmol/L


(3.5-5.1)


 


Chloride Level


 110 mmol/L


()  H


 


Carbon Dioxide Level


 32 mmol/L


(21-32)


 


Anion Gap 3 (6-14)  L


 


Blood Urea Nitrogen


 38 mg/dL


(8-26)  H


 


Creatinine


 1.0 mg/dL


(0.7-1.3)


 


Estimated GFR


(Cockcroft-Gault) 74.5  





 


BUN/Creatinine Ratio 38 (6-20)  H


 


Glucose Level


 84 mg/dL


(70-99)


 


Calcium Level


 8.2 mg/dL


(8.5-10.1)  L


 


Magnesium Level


 2.0 mg/dL


(1.8-2.4)


 


Total Bilirubin


 0.3 mg/dL


(0.2-1.0)


 


Aspartate Amino Transferase


(AST) 16 U/L (15-37)





 


Alanine Aminotransferase (ALT)


 19 U/L (16-63)





 


Alkaline Phosphatase


 65 U/L


()


 


Total Protein


 5.7 g/dL


(6.4-8.2)  L


 


Albumin


 3.0 g/dL


(3.4-5.0)  L


 


Albumin/Globulin Ratio 1.1 (1.0-1.7)  











Current Medications:


I have reviewed the current psychotropics carefully including drug interactions.

 Risk benefit ratio favors no change other than as noted in my dictated progress

note.





Diagnosis:


Problems:  


(1) Anxiety disorder


(2) Impulse control disorder


(3) Lewy body dementia with behavioral disturbance


(4) Major neurocognitive disorder, due to vascular disease, with behavioral 

disturbance, mild











MIRIAM JACOBS MD                  Jan 6, 2020 20:45

## 2020-01-06 NOTE — PN
DATE:  01/05/2020



PSYCHIATRIC PROGRESS NOTE



This late entry 01/05/2020 covers elements not covered in my initial note.



SUBJECTIVE:  I met with the patient evening of 01/05/2020.  The patient slept

9-1/4 hours previous night.  He has had a better day and previous night was

better as well.  He is coherent at times.



REVIEW OF SYSTEMS:  No CV, , pulmonary, eye system symptoms on review.



MENTAL STATUS EXAM:  Oriented to himself and situation, more oriented as I met

with him in the evening of 01/05/2020.  Speech has some latency, coherent, often

responses monosyllabic.  Abstraction fair, computation impaired, language

function intact, attention span short.  Mood and affect, lability is improved.



LABORATORY DATA:  Reviewed.



IMPRESSION:  Major neurocognitive disorder, Lewy body with delusion, depression,

behavioral disturbance; anxiety disorder, unspecified; impulse control disorder,

unspecified.



PLAN:  Continue current psychotropics.  Gradually increase the Clozaril, making

sure CBC, ANC are unremarkable.  Rest unchanged for now.  Depakote has been

stopped.





______________________________

MAN BRANDIN JACOBS MD



DR:  ISABELA/georgina  JOB#:  [id number] / [id number]

DD:  01/06/2020 13:34  DT:  01/06/2020 22:13

## 2020-01-06 NOTE — PN
DATE:  01/05/2020



PSYCHIATRIC PROGRESS NOTE



This late entry 01/03/2020 covers elements not covered in my initial note.



SUBJECTIVE:  I met with the patient evening of 01/03/2020 and staffed at a

treatment team meeting earlier in the day.  Late in the afternoon, I had a

message to call the patient's son, Jerry and returned his call had a lengthy

discussion about the patient's diagnosis, progress.  Per TARIK Ferrera, the patient

slept 6 hours.  Appetite is 50%.  Remains on one-on-one status because of fall

risk.  He had a fall at night and during the day.  He has intermittent visual

hallucinations.



REVIEW OF SYSTEMS:  Ambulation impaired.  No CV, , pulmonary, eye, ENT system

symptoms on review.  Reliability poor.



MENTAL STATUS EXAM:  Oriented to himself.  Insight, judgment, recent and remote

memory, attention, concentration, fund of knowledge poor, consistent with his

diagnosis.



IMPRESSION:  Major neurocognitive disorder, Lewy body with delusion, depression,

behavioral disturbance; anxiety disorder, unspecified; impulse control disorder,

unspecified.



PLAN:  Increase Exelon patch to 13.5 mg a day.  Given his unsteady gait, we will

stop the Depakote.  Maintain rest of the psychotropics unchanged.  Gradually

increase the Clozaril.





______________________________

MIRIAM JACOBS MD



DR:  ISABELA/georgina  JOB#:  [id number] / [id number]

DD:  01/05/2020 21:11  DT:  01/06/2020 01:15

## 2020-01-07 VITALS — DIASTOLIC BLOOD PRESSURE: 73 MMHG | SYSTOLIC BLOOD PRESSURE: 146 MMHG

## 2020-01-07 VITALS — DIASTOLIC BLOOD PRESSURE: 76 MMHG | SYSTOLIC BLOOD PRESSURE: 115 MMHG

## 2020-01-07 RX ADMIN — ENTACAPONE SCH MG: 200 TABLET, FILM COATED ORAL at 08:44

## 2020-01-07 RX ADMIN — MIRTAZAPINE SCH MG: 15 TABLET, ORALLY DISINTEGRATING ORAL at 19:52

## 2020-01-07 RX ADMIN — METOPROLOL SUCCINATE SCH MG: 50 TABLET, EXTENDED RELEASE ORAL at 08:44

## 2020-01-07 RX ADMIN — MELOXICAM SCH MG: 7.5 TABLET ORAL at 08:44

## 2020-01-07 RX ADMIN — ENTACAPONE SCH MG: 200 TABLET, FILM COATED ORAL at 19:52

## 2020-01-07 RX ADMIN — CARBIDOPA AND LEVODOPA SCH TAB: 25; 100 TABLET ORAL at 18:11

## 2020-01-07 RX ADMIN — CARBIDOPA AND LEVODOPA SCH TAB.SA: 25; 100 TABLET, EXTENDED RELEASE ORAL at 19:51

## 2020-01-07 RX ADMIN — CARBIDOPA AND LEVODOPA SCH TAB: 25; 100 TABLET ORAL at 23:08

## 2020-01-07 RX ADMIN — Medication SCH MCG: at 08:44

## 2020-01-07 RX ADMIN — CITALOPRAM HYDROBROMIDE SCH MG: 20 TABLET ORAL at 08:43

## 2020-01-07 RX ADMIN — CARBIDOPA AND LEVODOPA SCH TAB: 25; 100 TABLET ORAL at 11:43

## 2020-01-07 RX ADMIN — MEMANTINE HYDROCHLORIDE SCH MG: 10 TABLET ORAL at 19:51

## 2020-01-07 RX ADMIN — Medication SCH MG: at 08:42

## 2020-01-07 RX ADMIN — MELATONIN TAB 3 MG SCH MG: 3 TAB at 19:51

## 2020-01-07 RX ADMIN — POLYETHYLENE GLYCOL 3350 SCH GM: 17 POWDER, FOR SOLUTION ORAL at 08:45

## 2020-01-07 RX ADMIN — RIVASTIGMINE SCH PATCH: 13.3 PATCH, EXTENDED RELEASE TRANSDERMAL at 08:45

## 2020-01-07 RX ADMIN — CARBIDOPA AND LEVODOPA SCH TAB: 25; 100 TABLET ORAL at 05:38

## 2020-01-07 RX ADMIN — FINASTERIDE SCH MG: 5 TABLET, FILM COATED ORAL at 08:43

## 2020-01-07 RX ADMIN — MEMANTINE HYDROCHLORIDE SCH MG: 10 TABLET ORAL at 08:43

## 2020-01-07 RX ADMIN — CARBIDOPA AND LEVODOPA SCH TAB: 25; 100 TABLET ORAL at 14:17

## 2020-01-07 RX ADMIN — TRAZODONE HYDROCHLORIDE SCH MG: 100 TABLET ORAL at 19:52

## 2020-01-07 RX ADMIN — POLYETHYLENE GLYCOL 3350 SCH GM: 17 POWDER, FOR SOLUTION ORAL at 19:51

## 2020-01-07 NOTE — PDOC
Exam


Note:


Aren Note:


Please also refer to the separate dictated note~for this date of service 

dictated separately.~Patient seen individually. Discussed the patient with 

Nursing staff reviewed the chart.~Reviewed interim history and current 

functioning. Reviewed vital signs,~Labs/ Radiology~and current medications noted

below. Continue current treatment with the changes noted in the dictated 

addendum note





Assessment:


Vital Signs/I&O:





                                   Vital Signs








  Date Time  Temp Pulse Resp B/P (MAP) Pulse Ox O2 Delivery O2 Flow Rate FiO2


 


1/7/20 15:35 98.9 71 16 115/76 (89) 98   


 


1/3/20 06:11      Room Air  














                                    I & O   


 


 1/6/20 1/6/20 1/7/20





 15:00 23:00 07:00


 


Intake Total 720 ml 480 ml 


 


Balance 720 ml 480 ml 











Current Medications:


I have reviewed the current psychotropics carefully including drug interactions.

 Risk benefit ratio favors no change other than as noted in my dictated progress

note.





Diagnosis:


Problems:  


(1) Anxiety disorder


(2) Impulse control disorder


(3) Lewy body dementia with behavioral disturbance


(4) Major neurocognitive disorder, due to vascular disease, with behavioral 

disturbance, mild











MIRIAM JACOBS MD                  Jan 7, 2020 21:06

## 2020-01-07 NOTE — PN
DATE:  01/06/2020



PSYCHIATRIC PROGRESS NOTE



This late entry 01/06/2020 covers the elements not covered in my initial note.



SUBJECTIVE:  I met with the patient in the evening of 01/06/2020.  Per TARIK Koehler, the patient has been doing better, less agitated, less of a fall risk. 

Nursing staff requesting one-on-one could be discontinued.  After reviewing all

available information, I think keeping him on line of sight is appropriate for

now.  Also, discussed with TARIK Grace, the patient slept 7+ hours previous

night, intermittently anxious, but generally better than before.  Family visited

him and he was anxious to follow out with them and later wanting to call them,

repeatedly obsessed about this with nursing staff, also wanting my cellphone to

call his family, oblivious of the reasons for him being here.



REVIEW OF SYSTEMS:  No CV, , pulmonary, eye system symptoms on review. 

Reliability poor.



MENTAL STATUS EXAM:  Oriented to himself.  Other times well oriented.  Speech

has some latency, coherent.  Abstraction fair, computation impaired, language

function intact, attention span short.  Mood and affect, anxious, labile.



LABORATORY DATA:  Reviewed.



IMPRESSION:  Major neurocognitive disorder, Lewy body with delusion, depression,

behavioral disturbance.  Rest unchanged.



PLAN:  No change from initial note.  Continue to gradually increase the

Clozaril.  CBC, ANC are unremarkable so far, may increase the Clozaril on

01/07/2019.





______________________________

MAN BRANDIN JACOBS MD



DR:  ISABELA/georgina  JOB#:  [id number] / [id number]

DD:  01/07/2020 16:09  DT:  01/07/2020 22:28

## 2020-01-08 VITALS — SYSTOLIC BLOOD PRESSURE: 144 MMHG | DIASTOLIC BLOOD PRESSURE: 86 MMHG

## 2020-01-08 VITALS — DIASTOLIC BLOOD PRESSURE: 83 MMHG | SYSTOLIC BLOOD PRESSURE: 131 MMHG

## 2020-01-08 RX ADMIN — MELOXICAM SCH MG: 7.5 TABLET ORAL at 10:12

## 2020-01-08 RX ADMIN — Medication SCH MG: at 10:14

## 2020-01-08 RX ADMIN — METOPROLOL SUCCINATE SCH MG: 50 TABLET, EXTENDED RELEASE ORAL at 10:13

## 2020-01-08 RX ADMIN — CARBIDOPA AND LEVODOPA SCH TAB: 25; 100 TABLET ORAL at 18:00

## 2020-01-08 RX ADMIN — CARBIDOPA AND LEVODOPA SCH TAB: 25; 100 TABLET ORAL at 10:15

## 2020-01-08 RX ADMIN — RIVASTIGMINE SCH PATCH: 13.3 PATCH, EXTENDED RELEASE TRANSDERMAL at 10:11

## 2020-01-08 RX ADMIN — CARBIDOPA AND LEVODOPA SCH TAB: 25; 100 TABLET ORAL at 05:44

## 2020-01-08 RX ADMIN — POLYETHYLENE GLYCOL 3350 SCH GM: 17 POWDER, FOR SOLUTION ORAL at 10:09

## 2020-01-08 RX ADMIN — MEMANTINE HYDROCHLORIDE SCH MG: 10 TABLET ORAL at 10:12

## 2020-01-08 RX ADMIN — TRAZODONE HYDROCHLORIDE PRN MG: 100 TABLET ORAL at 01:20

## 2020-01-08 RX ADMIN — CARBIDOPA AND LEVODOPA SCH TAB: 25; 100 TABLET ORAL at 22:00

## 2020-01-08 RX ADMIN — TRAZODONE HYDROCHLORIDE SCH MG: 100 TABLET ORAL at 19:36

## 2020-01-08 RX ADMIN — MIRTAZAPINE SCH MG: 15 TABLET, ORALLY DISINTEGRATING ORAL at 19:36

## 2020-01-08 RX ADMIN — MELATONIN TAB 3 MG SCH MG: 3 TAB at 19:36

## 2020-01-08 RX ADMIN — MEMANTINE HYDROCHLORIDE SCH MG: 10 TABLET ORAL at 19:36

## 2020-01-08 RX ADMIN — CARBIDOPA AND LEVODOPA SCH TAB.SA: 25; 100 TABLET, EXTENDED RELEASE ORAL at 19:36

## 2020-01-08 RX ADMIN — Medication SCH MCG: at 10:12

## 2020-01-08 RX ADMIN — ENTACAPONE SCH MG: 200 TABLET, FILM COATED ORAL at 19:36

## 2020-01-08 RX ADMIN — FINASTERIDE SCH MG: 5 TABLET, FILM COATED ORAL at 10:12

## 2020-01-08 RX ADMIN — CARBIDOPA AND LEVODOPA SCH TAB: 25; 100 TABLET ORAL at 14:12

## 2020-01-08 RX ADMIN — POLYETHYLENE GLYCOL 3350 SCH GM: 17 POWDER, FOR SOLUTION ORAL at 19:36

## 2020-01-08 RX ADMIN — CITALOPRAM HYDROBROMIDE SCH MG: 20 TABLET ORAL at 10:13

## 2020-01-08 RX ADMIN — ENTACAPONE SCH MG: 200 TABLET, FILM COATED ORAL at 10:12

## 2020-01-08 NOTE — PDOC
Exam


Note:


Aren Note:


Please also refer to the separate dictated note~for this date of service 

dictated separately.~Patient seen individually. Discussed the patient with 

Nursing staff reviewed the chart.~Reviewed interim history and current 

functioning. Reviewed vital signs,~Labs/ Radiology~and current medications noted

below. Continue current treatment with the changes noted in the dictated 

addendum note





Assessment:


Vital Signs/I&O:





                                   Vital Signs








  Date Time  Temp Pulse Resp B/P (MAP) Pulse Ox O2 Delivery O2 Flow Rate FiO2


 


1/8/20 15:46  79 20 131/83 (99)  Room Air  


 


1/8/20 06:00 97.4    98   














                                    I & O   


 


 1/7/20 1/7/20 1/8/20





 15:00 23:00 07:00


 


Intake Total 840 ml 320 ml 


 


Balance 840 ml 320 ml 











Current Medications:


Meds:





Current Medications








 Medications


  (Trade)  Dose


 Ordered  Sig/Elijah


 Route


 PRN Reason  Start Time


 Stop Time Status Last Admin


Dose Admin


 


 Clozapine


  (Clozaril)  37.5 mg  BID


 PO


   1/8/20 09:00


    1/8/20 19:36











I have reviewed the current psychotropics carefully including drug interactions.

 Risk benefit ratio favors no change other than as noted in my dictated progress

note.





Diagnosis:


Problems:  


(1) Anxiety disorder


(2) Impulse control disorder


(3) Lewy body dementia with behavioral disturbance


(4) Major neurocognitive disorder, due to vascular disease, with behavioral 

disturbance, mild











MIRIAM JACOBS MD                  Jan 8, 2020 20:56

## 2020-01-09 VITALS — SYSTOLIC BLOOD PRESSURE: 109 MMHG | DIASTOLIC BLOOD PRESSURE: 67 MMHG

## 2020-01-09 VITALS — DIASTOLIC BLOOD PRESSURE: 88 MMHG | SYSTOLIC BLOOD PRESSURE: 141 MMHG

## 2020-01-09 RX ADMIN — MELOXICAM SCH MG: 7.5 TABLET ORAL at 10:07

## 2020-01-09 RX ADMIN — METOPROLOL SUCCINATE SCH MG: 50 TABLET, EXTENDED RELEASE ORAL at 10:10

## 2020-01-09 RX ADMIN — MIRTAZAPINE SCH MG: 15 TABLET, ORALLY DISINTEGRATING ORAL at 20:29

## 2020-01-09 RX ADMIN — RIVASTIGMINE SCH PATCH: 13.3 PATCH, EXTENDED RELEASE TRANSDERMAL at 10:10

## 2020-01-09 RX ADMIN — CARBIDOPA AND LEVODOPA SCH TAB: 25; 100 TABLET ORAL at 10:08

## 2020-01-09 RX ADMIN — CHOLECALCIFEROL CAP 1.25 MG (50000 UNIT) SCH UNIT: 1.25 CAP at 10:16

## 2020-01-09 RX ADMIN — POLYETHYLENE GLYCOL 3350 SCH GM: 17 POWDER, FOR SOLUTION ORAL at 10:11

## 2020-01-09 RX ADMIN — FINASTERIDE SCH MG: 5 TABLET, FILM COATED ORAL at 10:08

## 2020-01-09 RX ADMIN — CARBIDOPA AND LEVODOPA SCH TAB: 25; 100 TABLET ORAL at 17:26

## 2020-01-09 RX ADMIN — CARBIDOPA AND LEVODOPA SCH TAB: 25; 100 TABLET ORAL at 13:40

## 2020-01-09 RX ADMIN — CARBIDOPA AND LEVODOPA SCH TAB: 25; 100 TABLET ORAL at 05:37

## 2020-01-09 RX ADMIN — CITALOPRAM HYDROBROMIDE SCH MG: 20 TABLET ORAL at 10:08

## 2020-01-09 RX ADMIN — CARBIDOPA AND LEVODOPA SCH TAB: 25; 100 TABLET ORAL at 22:00

## 2020-01-09 RX ADMIN — MEMANTINE HYDROCHLORIDE SCH MG: 10 TABLET ORAL at 20:29

## 2020-01-09 RX ADMIN — Medication SCH MCG: at 10:08

## 2020-01-09 RX ADMIN — POLYETHYLENE GLYCOL 3350 SCH GM: 17 POWDER, FOR SOLUTION ORAL at 20:29

## 2020-01-09 RX ADMIN — Medication SCH MG: at 10:11

## 2020-01-09 RX ADMIN — MEMANTINE HYDROCHLORIDE SCH MG: 10 TABLET ORAL at 10:07

## 2020-01-09 RX ADMIN — CARBIDOPA AND LEVODOPA SCH TAB.SA: 25; 100 TABLET, EXTENDED RELEASE ORAL at 20:30

## 2020-01-09 RX ADMIN — MELATONIN TAB 3 MG SCH MG: 3 TAB at 20:29

## 2020-01-09 RX ADMIN — ENTACAPONE SCH MG: 200 TABLET, FILM COATED ORAL at 20:29

## 2020-01-09 RX ADMIN — ENTACAPONE SCH MG: 200 TABLET, FILM COATED ORAL at 10:08

## 2020-01-09 RX ADMIN — TRAZODONE HYDROCHLORIDE SCH MG: 100 TABLET ORAL at 20:29

## 2020-01-09 NOTE — PDOC
Exam


Note:


Aren Note:


Please also refer to the separate dictated note~for this date of service 

dictated separately.~Patient seen individually. Discussed the patient with 

Nursing staff reviewed the chart.~Reviewed interim history and current 

functioning. Reviewed vital signs,~Labs/ Radiology~and current medications noted

below. Continue current treatment with the changes noted in the dictated 

addendum note





Assessment:


Vital Signs/I&O:





                                   Vital Signs








  Date Time  Temp Pulse Resp B/P (MAP) Pulse Ox O2 Delivery O2 Flow Rate FiO2


 


1/9/20 15:26 97.8 73 17 109/67 (81) 97 Room Air  














                                    I & O   


 


 1/8/20 1/8/20 1/9/20





 15:00 23:00 07:00


 


Intake Total 480 ml 0 ml 


 


Balance 480 ml 0 ml 











Current Medications:


I have reviewed the current psychotropics carefully including drug interactions.

 Risk benefit ratio favors no change other than as noted in my dictated progress

note.





Diagnosis:


Problems:  


(1) Anxiety disorder


(2) Impulse control disorder


(3) Lewy body dementia with behavioral disturbance


(4) Major neurocognitive disorder, due to vascular disease, with behavioral 

disturbance, mild











MIRIAM JACOBS MD                  Jan 9, 2020 21:04
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
## 2020-01-09 NOTE — PN
DATE:  01/07/2020



PSYCHIATRIC PROGRESS NOTE



This late entry 01/07/2020 covers elements not covered in my initial note.



SUBJECTIVE:  I met with the patient evening of 01/07/2020.  Per TARIK Perez,

the patient slept 7-3/4 hours previous night.  His gait was unsteady in the

morning, was in a wheelchair Previous night, he spit out his medications at

TARIK Jacinto.  He put himself on the floor at one point.  Received Ativan IM during

the day at 19:53.  Absolute neutrophil count is unremarkable.  WBC 5,

neutrophils 54% and we will increase the Clozaril to 37.5 mg twice a day.



REVIEW OF SYSTEMS:  No CV, , pulmonary, eye, ENT system symptoms on review. 

Reliability poor.



MENTAL STATUS EXAM:  Oriented to himself at times and other times, much better,

oriented consistent with Lewy body dementia.  No CV, , pulmonary, eye, ENT

system symptoms on review.  Insight, judgment, recent and remote memory,

attention, concentration, fund of knowledge poor, at times and much better at

other times, consistent with his diagnosis.



IMPRESSION:  Unchanged from initial note.



PLAN:  Increase the Clozaril as above.  Rest unchanged from initial note.





______________________________

MIRIAM JACOBS MD



DR:  ISABELA/georgina  JOB#:  [id number] / [id number]

DD:  01/08/2020 17:20  DT:  01/09/2020 03:11

## 2020-01-10 VITALS — DIASTOLIC BLOOD PRESSURE: 78 MMHG | SYSTOLIC BLOOD PRESSURE: 141 MMHG

## 2020-01-10 RX ADMIN — ENTACAPONE SCH MG: 200 TABLET, FILM COATED ORAL at 19:53

## 2020-01-10 RX ADMIN — MELATONIN TAB 3 MG SCH MG: 3 TAB at 19:54

## 2020-01-10 RX ADMIN — CARBIDOPA AND LEVODOPA SCH TAB: 25; 100 TABLET ORAL at 19:54

## 2020-01-10 RX ADMIN — FINASTERIDE SCH MG: 5 TABLET, FILM COATED ORAL at 08:45

## 2020-01-10 RX ADMIN — RIVASTIGMINE SCH PATCH: 13.3 PATCH, EXTENDED RELEASE TRANSDERMAL at 08:41

## 2020-01-10 RX ADMIN — Medication SCH MG: at 08:40

## 2020-01-10 RX ADMIN — CARBIDOPA AND LEVODOPA SCH TAB: 25; 100 TABLET ORAL at 14:15

## 2020-01-10 RX ADMIN — ENTACAPONE SCH MG: 200 TABLET, FILM COATED ORAL at 08:43

## 2020-01-10 RX ADMIN — POLYETHYLENE GLYCOL 3350 SCH GM: 17 POWDER, FOR SOLUTION ORAL at 08:41

## 2020-01-10 RX ADMIN — METOPROLOL SUCCINATE SCH MG: 50 TABLET, EXTENDED RELEASE ORAL at 08:44

## 2020-01-10 RX ADMIN — Medication SCH MCG: at 08:45

## 2020-01-10 RX ADMIN — MAGNESIUM HYDROXIDE PRN MG: 400 SUSPENSION ORAL at 08:41

## 2020-01-10 RX ADMIN — MIRTAZAPINE SCH MG: 15 TABLET, ORALLY DISINTEGRATING ORAL at 19:53

## 2020-01-10 RX ADMIN — MEMANTINE HYDROCHLORIDE SCH MG: 10 TABLET ORAL at 19:54

## 2020-01-10 RX ADMIN — CARBIDOPA AND LEVODOPA SCH TAB.SA: 25; 100 TABLET, EXTENDED RELEASE ORAL at 19:54

## 2020-01-10 RX ADMIN — POLYETHYLENE GLYCOL 3350 SCH GM: 17 POWDER, FOR SOLUTION ORAL at 19:55

## 2020-01-10 RX ADMIN — CARBIDOPA AND LEVODOPA SCH TAB: 25; 100 TABLET ORAL at 06:04

## 2020-01-10 RX ADMIN — CARBIDOPA AND LEVODOPA SCH TAB: 25; 100 TABLET ORAL at 08:45

## 2020-01-10 RX ADMIN — CARBIDOPA AND LEVODOPA SCH TAB: 25; 100 TABLET ORAL at 17:17

## 2020-01-10 RX ADMIN — MELOXICAM SCH MG: 7.5 TABLET ORAL at 08:45

## 2020-01-10 RX ADMIN — MEMANTINE HYDROCHLORIDE SCH MG: 10 TABLET ORAL at 08:44

## 2020-01-10 RX ADMIN — TRAZODONE HYDROCHLORIDE SCH MG: 100 TABLET ORAL at 19:53

## 2020-01-10 RX ADMIN — CITALOPRAM HYDROBROMIDE SCH MG: 20 TABLET ORAL at 08:44

## 2020-01-10 NOTE — PDOC
Exam


Note:


Aren Note:


Please also refer to the separate dictated note~for this date of service 

dictated separately.~Patient seen individually. Discussed the patient with 

Nursing staff reviewed the chart.~Reviewed interim history and current 

functioning. Reviewed vital signs,~Labs/ Radiology~and current medications noted

below. Continue current treatment with the changes noted in the dictated 

addendum note





Assessment:


Vital Signs/I&O:





                                   Vital Signs








  Date Time  Temp Pulse Resp B/P (MAP) Pulse Ox O2 Delivery O2 Flow Rate FiO2


 


1/10/20 08:44  79  141/78    


 


1/10/20 05:54 97.8  14  98   


 


1/9/20 15:26      Room Air  














                                    I & O   


 


 1/9/20 1/9/20 1/10/20





 15:00 23:00 07:00


 


Intake Total 1200 ml  


 


Balance 1200 ml  











Current Medications:


I have reviewed the current psychotropics carefully including drug interactions.

 Risk benefit ratio favors no change other than as noted in my dictated progress

note.





Diagnosis:


Problems:  


(1) Anxiety disorder


(2) Impulse control disorder


(3) Lewy body dementia with behavioral disturbance


(4) Major neurocognitive disorder, due to vascular disease, with behavioral 

disturbance, mild











MIRIAM JACOBS MD                 Bryn 10, 2020 21:04

## 2020-01-10 NOTE — PN
DATE:  01/08/2020



PSYCHIATRIC PROGRESS NOTE



This late entry 01/08/2020 covers elements not covered in my initial note.



SUBJECTIVE:  I met with the patient evening of 01/08/2020.  Per nursing report,

the patient did well until about 3 p.m. per TARIK Perez.  After that, he had a

nap and was irritable, exit seeking, wanting his clothes, wanting to go home,

and getting more agitated, disruptive.



REVIEW OF SYSTEMS:  No CV, , pulmonary, eye, ENT system symptoms on review. 

Reliability poor.



MENTAL STATUS EXAM:  Oriented to himself.  Insight, judgment, recent and remote

memory, attention, concentration, fund of knowledge poor, consistent with his

diagnosis.  At other times, he is very well oriented and consistent with Lewy

body dementia.



LABORATORY DATA:  Reviewed.



IMPRESSION:  Major neurocognitive disorder, Lewy body with delusion, depression,

behavioral disturbance; anxiety disorder, unspecified; impulse control disorder,

unspecified.  Rest unchanged.



PLAN:  Continue current psychotropics.  We will gradually increase the Clozaril.

 Monitor his labs.  Continue rest unchanged.  He is ambulating better since we

stopped the Depakote.





______________________________

MIRIAM JACOBS MD



DR:  ISABELA/georgina  JOB#:  [id number] / [id number]

DD:  01/10/2020 09:43  DT:  01/10/2020 20:44

## 2020-01-10 NOTE — PN
DATE:  01/09/2020



This late entry, 01/09, covers elements not covered in my initial note.



SUBJECTIVE:  I met with the patient in evening of 01/09 staffed at a treatment

team meeting with the entire team in the morning and the patient's son, Jerry,

attended the conference.  The patient slept 7 hours previous night, appetite

75%.  The patient is compliant with medications, assessments.  He gets a little

more agitated, anxious, paranoid in the evening and has significant variations

in his mood.  Cognition, psychosis within the span of a day consistent with his

Lewy body dementia diagnosis.



REVIEW OF SYSTEMS:  No CV, , pulmonary, eye system symptoms on review.  Gait

unsteady at times.



MENTAL STATUS EXAM:  At times, well oriented, other times quite confused,

psychotic.  Insight, judgment, recent memory is impaired.  Language function

intact.  Attention span short.  Mood and affect remains intermittently labile.



LABORATORY DATA:  Reviewed.



IMPRESSION:  Major neurocognitive disorder, Lewy body with delusion, depression,

behavioral disturbance; anxiety disorder, unspecified; impulse control disorder,

unspecified.



PLAN:  The patient had done quite well throughout the day.  The patient's wife

was allowed to visit him in the evening and after the visit, he has been

extremely aggressive, disruptive, psychotic, banging on doors, labile, had to be

placed in the West hallway.  This seems to happen after family visits.  When it

reminds him of his home, wanting to return there and then the psychosis just

manifests itself with marked aggression.  We will limit the visits for a while

from the family and then decide.  In the interim, continue his current

psychotropics from my initial note for now.  Gradually increase the Clozaril

further as indicated.





______________________________

MAN BRANDIN JACOBS MD



DR:  ISABELA/georgina  JOB#:  [id number] / [id number]

DD:  01/10/2020 12:24  DT:  01/10/2020 23:30

## 2020-01-11 VITALS — DIASTOLIC BLOOD PRESSURE: 82 MMHG | SYSTOLIC BLOOD PRESSURE: 133 MMHG

## 2020-01-11 VITALS — DIASTOLIC BLOOD PRESSURE: 64 MMHG | SYSTOLIC BLOOD PRESSURE: 105 MMHG

## 2020-01-11 RX ADMIN — CITALOPRAM HYDROBROMIDE SCH MG: 20 TABLET ORAL at 08:28

## 2020-01-11 RX ADMIN — RIVASTIGMINE SCH PATCH: 13.3 PATCH, EXTENDED RELEASE TRANSDERMAL at 08:30

## 2020-01-11 RX ADMIN — Medication SCH MCG: at 08:30

## 2020-01-11 RX ADMIN — FINASTERIDE SCH MG: 5 TABLET, FILM COATED ORAL at 08:30

## 2020-01-11 RX ADMIN — TRAZODONE HYDROCHLORIDE SCH MG: 100 TABLET ORAL at 19:42

## 2020-01-11 RX ADMIN — CARBIDOPA AND LEVODOPA SCH TAB: 25; 100 TABLET ORAL at 17:16

## 2020-01-11 RX ADMIN — MEMANTINE HYDROCHLORIDE SCH MG: 10 TABLET ORAL at 19:42

## 2020-01-11 RX ADMIN — POLYVINYL ALCOHOL PRN DROP: 14 SOLUTION/ DROPS OPHTHALMIC at 08:31

## 2020-01-11 RX ADMIN — ENTACAPONE SCH MG: 200 TABLET, FILM COATED ORAL at 19:41

## 2020-01-11 RX ADMIN — POLYETHYLENE GLYCOL 3350 SCH GM: 17 POWDER, FOR SOLUTION ORAL at 08:29

## 2020-01-11 RX ADMIN — CARBIDOPA AND LEVODOPA SCH TAB: 25; 100 TABLET ORAL at 13:26

## 2020-01-11 RX ADMIN — CARBIDOPA AND LEVODOPA SCH TAB.SA: 25; 100 TABLET, EXTENDED RELEASE ORAL at 19:42

## 2020-01-11 RX ADMIN — LACTULOSE SCH GM: 20 SOLUTION ORAL at 08:29

## 2020-01-11 RX ADMIN — MIRTAZAPINE SCH MG: 15 TABLET, ORALLY DISINTEGRATING ORAL at 19:42

## 2020-01-11 RX ADMIN — CARBIDOPA AND LEVODOPA SCH TAB: 25; 100 TABLET ORAL at 08:31

## 2020-01-11 RX ADMIN — MELATONIN TAB 3 MG SCH MG: 3 TAB at 19:41

## 2020-01-11 RX ADMIN — METOPROLOL SUCCINATE SCH MG: 50 TABLET, EXTENDED RELEASE ORAL at 08:30

## 2020-01-11 RX ADMIN — CARBIDOPA AND LEVODOPA SCH TAB: 25; 100 TABLET ORAL at 06:36

## 2020-01-11 RX ADMIN — MELOXICAM SCH MG: 7.5 TABLET ORAL at 08:30

## 2020-01-11 RX ADMIN — POLYETHYLENE GLYCOL 3350 SCH GM: 17 POWDER, FOR SOLUTION ORAL at 19:42

## 2020-01-11 RX ADMIN — MEMANTINE HYDROCHLORIDE SCH MG: 10 TABLET ORAL at 08:30

## 2020-01-11 RX ADMIN — ACETAMINOPHEN PRN MG: 325 TABLET, FILM COATED ORAL at 03:13

## 2020-01-11 RX ADMIN — CARBIDOPA AND LEVODOPA SCH TAB: 25; 100 TABLET ORAL at 19:43

## 2020-01-11 RX ADMIN — Medication SCH MG: at 08:27

## 2020-01-11 RX ADMIN — TRAZODONE HYDROCHLORIDE PRN MG: 100 TABLET ORAL at 23:07

## 2020-01-11 RX ADMIN — ENTACAPONE SCH MG: 200 TABLET, FILM COATED ORAL at 08:29

## 2020-01-11 NOTE — PDOC
Exam


Note:


Aren Note:


Please also refer to the separate dictated note~for this date of service 

dictated separately.~Patient seen individually. Discussed the patient with 

Nursing staff reviewed the chart.~Reviewed interim history and current 

functioning. Reviewed vital signs,~Labs/ Radiology~and current medications noted

below. Continue current treatment with the changes noted in the dictated 

addendum note





Assessment:


Vital Signs/I&O:





                                   Vital Signs








  Date Time  Temp Pulse Resp B/P (MAP) Pulse Ox O2 Delivery O2 Flow Rate FiO2


 


1/11/20 16:05 97.9 67 16 133/82 (99) 97   


 


1/9/20 15:26      Room Air  














                                    I & O   


 


 1/10/20 1/10/20 1/11/20





 15:00 23:00 07:00


 


Intake Total 960 ml 720 ml 


 


Balance 960 ml 720 ml 











Current Medications:


Meds:





Current Medications








 Medications


  (Trade)  Dose


 Ordered  Sig/Elijah


 Route


 PRN Reason  Start Time


 Stop Time Status Last Admin


Dose Admin


 


 Lactulose


  (Lactulose)  20 gm  DAILY


 PO


   1/11/20 09:00


    1/11/20 08:29











I have reviewed the current psychotropics carefully including drug interactions.

 Risk benefit ratio favors no change other than as noted in my dictated progress

note.





Diagnosis:


Problems:  


(1) Anxiety disorder


(2) Impulse control disorder


(3) Lewy body dementia with behavioral disturbance


(4) Major neurocognitive disorder, due to vascular disease, with behavioral 

disturbance, mild











MIRIAM JACOBS MD                 Jan 11, 2020 21:41

## 2020-01-12 VITALS — SYSTOLIC BLOOD PRESSURE: 110 MMHG | DIASTOLIC BLOOD PRESSURE: 71 MMHG

## 2020-01-12 VITALS — DIASTOLIC BLOOD PRESSURE: 79 MMHG | SYSTOLIC BLOOD PRESSURE: 106 MMHG

## 2020-01-12 RX ADMIN — LACTULOSE SCH GM: 20 SOLUTION ORAL at 09:04

## 2020-01-12 RX ADMIN — TRAZODONE HYDROCHLORIDE SCH MG: 100 TABLET ORAL at 19:50

## 2020-01-12 RX ADMIN — POLYETHYLENE GLYCOL 3350 SCH GM: 17 POWDER, FOR SOLUTION ORAL at 09:04

## 2020-01-12 RX ADMIN — CARBIDOPA AND LEVODOPA SCH TAB: 25; 100 TABLET ORAL at 05:58

## 2020-01-12 RX ADMIN — ENTACAPONE SCH MG: 200 TABLET, FILM COATED ORAL at 09:04

## 2020-01-12 RX ADMIN — MELATONIN TAB 3 MG SCH MG: 3 TAB at 19:50

## 2020-01-12 RX ADMIN — FINASTERIDE SCH MG: 5 TABLET, FILM COATED ORAL at 09:05

## 2020-01-12 RX ADMIN — MELOXICAM SCH MG: 7.5 TABLET ORAL at 09:04

## 2020-01-12 RX ADMIN — MIRTAZAPINE SCH MG: 15 TABLET, ORALLY DISINTEGRATING ORAL at 19:49

## 2020-01-12 RX ADMIN — TRAZODONE HYDROCHLORIDE PRN MG: 100 TABLET ORAL at 02:21

## 2020-01-12 RX ADMIN — CARBIDOPA AND LEVODOPA SCH TAB: 25; 100 TABLET ORAL at 19:51

## 2020-01-12 RX ADMIN — Medication SCH MG: at 09:02

## 2020-01-12 RX ADMIN — CITALOPRAM HYDROBROMIDE SCH MG: 20 TABLET ORAL at 09:03

## 2020-01-12 RX ADMIN — RIVASTIGMINE SCH PATCH: 13.3 PATCH, EXTENDED RELEASE TRANSDERMAL at 09:05

## 2020-01-12 RX ADMIN — MEMANTINE HYDROCHLORIDE SCH MG: 10 TABLET ORAL at 19:50

## 2020-01-12 RX ADMIN — Medication SCH MCG: at 09:05

## 2020-01-12 RX ADMIN — MEMANTINE HYDROCHLORIDE SCH MG: 10 TABLET ORAL at 09:04

## 2020-01-12 RX ADMIN — POLYETHYLENE GLYCOL 3350 SCH GM: 17 POWDER, FOR SOLUTION ORAL at 19:49

## 2020-01-12 RX ADMIN — CARBIDOPA AND LEVODOPA SCH TAB.SA: 25; 100 TABLET, EXTENDED RELEASE ORAL at 19:50

## 2020-01-12 RX ADMIN — CARBIDOPA AND LEVODOPA SCH TAB: 25; 100 TABLET ORAL at 09:06

## 2020-01-12 RX ADMIN — METOPROLOL SUCCINATE SCH MG: 50 TABLET, EXTENDED RELEASE ORAL at 09:05

## 2020-01-12 RX ADMIN — CARBIDOPA AND LEVODOPA SCH TAB: 25; 100 TABLET ORAL at 13:34

## 2020-01-12 RX ADMIN — TRAZODONE HYDROCHLORIDE PRN MG: 100 TABLET ORAL at 22:26

## 2020-01-12 RX ADMIN — ENTACAPONE SCH MG: 200 TABLET, FILM COATED ORAL at 19:49

## 2020-01-12 RX ADMIN — CARBIDOPA AND LEVODOPA SCH TAB: 25; 100 TABLET ORAL at 17:16

## 2020-01-12 NOTE — PN
DATE:  01/10/2020



This late entry, 01/10/2020, covers the elements not covered in my initial note.



SUBJECTIVE:  I met with the patient in the evening.  Per TARIK Sanford, the patient

slept 7-1/2 hours previous night.  He has had a better day and compliant with

medications, but has had some constipation.  The patient's wife states lactulose

has helped in the past and we will start this.  He takes his medications in

chocolate pudding.



REVIEW OF SYSTEMS:  No CV, , pulmonary, or eye system symptoms on review.



MENTAL STATUS EXAM:  Oriented to himself and at times situation.  Speech is

coherent and has some latency.  Abstraction is fair, computation is impaired,

and language function is intact.  Mood and affect, remained somewhat withdrawn

at times and other times, labile, but lability is overall better.



LABORATORY DATA:  Reviewed.



IMPRESSION:  Major neurocognitive disorder, Lewy body with delusion, depression,

and behavioral disturbance.  Rest unchanged.



PLAN:  Continue current psychotropics from initial note for now.





______________________________

MAN BRANDIN JACOBS MD



DR:  ISABELA/georgina  JOB#:  [id number] / [id number]

DD:  01/12/2020 10:58  DT:  01/12/2020 15:36

## 2020-01-12 NOTE — PDOC
Exam


Note:


Aren Note:


Please also refer to the separate dictated note~for this date of service 

dictated separately.~Patient seen individually. Discussed the patient with 

Nursing staff reviewed the chart.~Reviewed interim history and current 

functioning. Reviewed vital signs,~Labs/ Radiology~and current medications noted

below. Continue current treatment with the changes noted in the dictated 

addendum note





Assessment:


Vital Signs/I&O:





                                   Vital Signs








  Date Time  Temp Pulse Resp B/P (MAP) Pulse Ox O2 Delivery O2 Flow Rate FiO2


 


1/12/20 15:48 97.8 74 20 106/79 (88) 97   


 


1/9/20 15:26      Room Air  














                                    I & O   


 


 1/11/20 1/11/20 1/12/20





 15:00 23:00 07:00


 


Intake Total 960 ml 480 ml 


 


Balance 960 ml 480 ml 











Current Medications:


I have reviewed the current psychotropics carefully including drug interactions.

 Risk benefit ratio favors no change other than as noted in my dictated progress

note.





Diagnosis:


Problems:  


(1) Anxiety disorder


(2) Impulse control disorder


(3) Lewy body dementia with behavioral disturbance


(4) Major neurocognitive disorder, due to vascular disease, with behavioral 

disturbance, mild











MIRIAM JACOBS MD                 Jan 12, 2020 20:51

## 2020-01-12 NOTE — PN
DATE:  01/11/2020



PSYCHIATRIC PROGRESS NOTE



This late entry 01/11/2020 covers the elements not covered in my initial note.



SUBJECTIVE:  I met with the patient in the evening of 01/11/2020.  Per TARIK Hsu, the patient slept 7-1/4 hours previous night.  He has had a good day, less

psychotic, less agitated, received Zyprexa x 1 for anxiety.



REVIEW OF SYSTEMS:  No CV, , pulmonary, eye, ENT system symptoms on review. 

Reliability varies.



MENTAL STATUS EXAM:  Oriented to himself at times to situation.  Speech

coherent, abstraction fair, computation impaired, language function intact,

attention span short.  Mood and affect, lability is improved.



LABORATORY DATA:  Reviewed.



IMPRESSION:  Major neurocognitive disorder, Lewy body with delusion, depression,

behavioral disturbance.  Rest unchanged.



PLAN:  Continue psychotropics from initial note.  Follow CBC, ANC on 01/13/2020

and then may need to increase the Clozaril further.





______________________________

MAN BRANDIN JACOBS MD



DR:  ISABELA/georgina  JOB#:  [id number] / [id number]

DD:  01/12/2020 16:49  DT:  01/12/2020 21:53

## 2020-01-13 VITALS — SYSTOLIC BLOOD PRESSURE: 107 MMHG | DIASTOLIC BLOOD PRESSURE: 75 MMHG

## 2020-01-13 VITALS — SYSTOLIC BLOOD PRESSURE: 105 MMHG | DIASTOLIC BLOOD PRESSURE: 62 MMHG

## 2020-01-13 LAB
ALBUMIN SERPL-MCNC: 3 G/DL (ref 3.4–5)
ALBUMIN/GLOB SERPL: 0.9 {RATIO} (ref 1–1.7)
ALP SERPL-CCNC: 67 U/L (ref 46–116)
ALT SERPL-CCNC: 13 U/L (ref 16–63)
ANION GAP SERPL CALC-SCNC: 6 MMOL/L (ref 6–14)
AST SERPL-CCNC: 16 U/L (ref 15–37)
BASOPHILS # BLD AUTO: 0 X10^3/UL (ref 0–0.2)
BASOPHILS NFR BLD: 1 % (ref 0–3)
BILIRUB SERPL-MCNC: 0.4 MG/DL (ref 0.2–1)
BUN/CREAT SERPL: 29 (ref 6–20)
CA-I SERPL ISE-MCNC: 26 MG/DL (ref 8–26)
CALCIUM SERPL-MCNC: 8.3 MG/DL (ref 8.5–10.1)
CHLORIDE SERPL-SCNC: 107 MMOL/L (ref 98–107)
CO2 SERPL-SCNC: 30 MMOL/L (ref 21–32)
CREAT SERPL-MCNC: 0.9 MG/DL (ref 0.7–1.3)
EOSINOPHIL NFR BLD: 0.4 X10^3/UL (ref 0–0.7)
EOSINOPHIL NFR BLD: 9 % (ref 0–3)
ERYTHROCYTE [DISTWIDTH] IN BLOOD BY AUTOMATED COUNT: 13.7 % (ref 11.5–14.5)
GFR SERPLBLD BASED ON 1.73 SQ M-ARVRAT: 84.2 ML/MIN
GLOBULIN SER-MCNC: 3.3 G/DL (ref 2.2–3.8)
GLUCOSE SERPL-MCNC: 102 MG/DL (ref 70–99)
HCT VFR BLD CALC: 34.7 % (ref 39–53)
HGB BLD-MCNC: 11.6 G/DL (ref 13–17.5)
LYMPHOCYTES # BLD: 0.5 X10^3/UL (ref 1–4.8)
LYMPHOCYTES NFR BLD AUTO: 12 % (ref 24–48)
MCH RBC QN AUTO: 29 PG (ref 25–35)
MCHC RBC AUTO-ENTMCNC: 33 G/DL (ref 31–37)
MCV RBC AUTO: 87 FL (ref 79–100)
MONO #: 0.5 X10^3/UL (ref 0–1.1)
MONOCYTES NFR BLD: 11 % (ref 0–9)
NEUT #: 3.1 X10^3UL (ref 1.8–7.7)
NEUTROPHILS NFR BLD AUTO: 68 % (ref 31–73)
PLATELET # BLD AUTO: 156 X10^3/UL (ref 140–400)
POTASSIUM SERPL-SCNC: 4.2 MMOL/L (ref 3.5–5.1)
PROT SERPL-MCNC: 6.3 G/DL (ref 6.4–8.2)
RBC # BLD AUTO: 3.97 X10^6/UL (ref 4.3–5.7)
SODIUM SERPL-SCNC: 143 MMOL/L (ref 136–145)
WBC # BLD AUTO: 4.6 X10^3/UL (ref 4–11)

## 2020-01-13 RX ADMIN — ENTACAPONE SCH MG: 200 TABLET, FILM COATED ORAL at 08:08

## 2020-01-13 RX ADMIN — FINASTERIDE SCH MG: 5 TABLET, FILM COATED ORAL at 08:08

## 2020-01-13 RX ADMIN — MEMANTINE HYDROCHLORIDE SCH MG: 10 TABLET ORAL at 08:08

## 2020-01-13 RX ADMIN — MELOXICAM SCH MG: 7.5 TABLET ORAL at 08:08

## 2020-01-13 RX ADMIN — POLYETHYLENE GLYCOL 3350 SCH GM: 17 POWDER, FOR SOLUTION ORAL at 20:42

## 2020-01-13 RX ADMIN — CARBIDOPA AND LEVODOPA SCH TAB.SA: 25; 100 TABLET, EXTENDED RELEASE ORAL at 20:42

## 2020-01-13 RX ADMIN — CARBIDOPA AND LEVODOPA SCH TAB: 25; 100 TABLET ORAL at 17:29

## 2020-01-13 RX ADMIN — CARBIDOPA AND LEVODOPA SCH TAB: 25; 100 TABLET ORAL at 22:00

## 2020-01-13 RX ADMIN — ENTACAPONE SCH MG: 200 TABLET, FILM COATED ORAL at 20:41

## 2020-01-13 RX ADMIN — POLYVINYL ALCOHOL PRN DROP: 14 SOLUTION/ DROPS OPHTHALMIC at 08:11

## 2020-01-13 RX ADMIN — CARBIDOPA AND LEVODOPA SCH TAB: 25; 100 TABLET ORAL at 08:20

## 2020-01-13 RX ADMIN — LACTULOSE SCH GM: 20 SOLUTION ORAL at 08:08

## 2020-01-13 RX ADMIN — RIVASTIGMINE SCH PATCH: 13.3 PATCH, EXTENDED RELEASE TRANSDERMAL at 08:11

## 2020-01-13 RX ADMIN — POLYETHYLENE GLYCOL 3350 SCH GM: 17 POWDER, FOR SOLUTION ORAL at 08:08

## 2020-01-13 RX ADMIN — CARBIDOPA AND LEVODOPA SCH TAB: 25; 100 TABLET ORAL at 06:14

## 2020-01-13 RX ADMIN — CITALOPRAM HYDROBROMIDE SCH MG: 20 TABLET ORAL at 08:02

## 2020-01-13 RX ADMIN — MEMANTINE HYDROCHLORIDE SCH MG: 10 TABLET ORAL at 20:42

## 2020-01-13 RX ADMIN — Medication SCH MCG: at 08:11

## 2020-01-13 RX ADMIN — TRAZODONE HYDROCHLORIDE SCH MG: 100 TABLET ORAL at 20:42

## 2020-01-13 RX ADMIN — Medication SCH MG: at 07:58

## 2020-01-13 RX ADMIN — MIRTAZAPINE SCH MG: 15 TABLET, ORALLY DISINTEGRATING ORAL at 20:41

## 2020-01-13 RX ADMIN — MELATONIN TAB 3 MG SCH MG: 3 TAB at 20:41

## 2020-01-13 RX ADMIN — METOPROLOL SUCCINATE SCH MG: 50 TABLET, EXTENDED RELEASE ORAL at 08:09

## 2020-01-13 RX ADMIN — CARBIDOPA AND LEVODOPA SCH TAB: 25; 100 TABLET ORAL at 12:59

## 2020-01-13 NOTE — PDOC
Exam


Note:


Aren Note:


Please also refer to the separate dictated note~for this date of service 

dictated separately.~Patient seen individually. Discussed the patient with 

Nursing staff reviewed the chart.~Reviewed interim history and current 

functioning. Reviewed vital signs,~Labs/ Radiology~and current medications noted

below. Continue current treatment with the changes noted in the dictated 

addendum note





Assessment:


Vital Signs/I&O:





                                   Vital Signs








  Date Time  Temp Pulse Resp B/P (MAP) Pulse Ox O2 Delivery O2 Flow Rate FiO2


 


1/13/20 15:50 99.0 74 18 105/62 (76) 97 Room Air  














                                    I & O   


 


 1/12/20 1/12/20 1/13/20





 15:00 23:00 07:00


 


Intake Total 200 ml 240 ml 


 


Balance 200 ml 240 ml 








Labs:





                                Laboratory Tests








Test


 1/13/20


06:24


 


White Blood Count


 4.6 x10^3/uL


(4.0-11.0)


 


Red Blood Count


 3.97 x10^6/uL


(4.30-5.70)  L


 


Hemoglobin


 11.6 g/dL


(13.0-17.5)  L


 


Hematocrit


 34.7 %


(39.0-53.0)  L


 


Mean Corpuscular Volume


 87 fL ()





 


Mean Corpuscular Hemoglobin 29 pg (25-35)  


 


Mean Corpuscular Hemoglobin


Concent 33 g/dL


(31-37)


 


Red Cell Distribution Width


 13.7 %


(11.5-14.5)


 


Platelet Count


 156 x10^3/uL


(140-400)


 


Neutrophils (%) (Auto) 68 % (31-73)  


 


Lymphocytes (%) (Auto) 12 % (24-48)  L


 


Monocytes (%) (Auto) 11 % (0-9)  H


 


Eosinophils (%) (Auto) 9 % (0-3)  H


 


Basophils (%) (Auto) 1 % (0-3)  


 


Neutrophils # (Auto)


 3.1 x10^3uL


(1.8-7.7)


 


Lymphocytes # (Auto)


 0.5 x10^3/uL


(1.0-4.8)  L


 


Monocytes # (Auto)


 0.5 x10^3/uL


(0.0-1.1)


 


Eosinophils # (Auto)


 0.4 x10^3/uL


(0.0-0.7)


 


Basophils # (Auto)


 0.0 x10^3/uL


(0.0-0.2)


 


Sodium Level


 143 mmol/L


(136-145)


 


Potassium Level


 4.2 mmol/L


(3.5-5.1)


 


Chloride Level


 107 mmol/L


()


 


Carbon Dioxide Level


 30 mmol/L


(21-32)


 


Anion Gap 6 (6-14)  


 


Blood Urea Nitrogen


 26 mg/dL


(8-26)


 


Creatinine


 0.9 mg/dL


(0.7-1.3)


 


Estimated GFR


(Cockcroft-Gault) 84.2  





 


BUN/Creatinine Ratio 29 (6-20)  H


 


Glucose Level


 102 mg/dL


(70-99)  H


 


Calcium Level


 8.3 mg/dL


(8.5-10.1)  L


 


Total Bilirubin


 0.4 mg/dL


(0.2-1.0)


 


Aspartate Amino Transferase


(AST) 16 U/L (15-37)





 


Alanine Aminotransferase (ALT)


 13 U/L (16-63)


L


 


Alkaline Phosphatase


 67 U/L


()


 


Total Protein


 6.3 g/dL


(6.4-8.2)  L


 


Albumin


 3.0 g/dL


(3.4-5.0)  L


 


Albumin/Globulin Ratio


 0.9 (1.0-1.7)


L











Current Medications:


Meds:





Current Medications








 Medications


  (Trade)  Dose


 Ordered  Sig/Elijah


 Route


 PRN Reason  Start Time


 Stop Time Status Last Admin


Dose Admin


 


 Clozapine


  (Clozaril)  50 mg  HS


 PO


   1/13/20 21:00


    1/13/20 20:42











I have reviewed the current psychotropics carefully including drug interactions.

 Risk benefit ratio favors no change other than as noted in my dictated progress

note.





Diagnosis:


Problems:  


(1) Anxiety disorder


(2) Impulse control disorder


(3) Lewy body dementia with behavioral disturbance


(4) Major neurocognitive disorder, due to vascular disease, with behavioral 

disturbance, mild











MIRIAM JACOBS MD                 Jan 13, 2020 21:11

## 2020-01-14 VITALS — SYSTOLIC BLOOD PRESSURE: 138 MMHG | DIASTOLIC BLOOD PRESSURE: 83 MMHG

## 2020-01-14 VITALS — SYSTOLIC BLOOD PRESSURE: 135 MMHG | DIASTOLIC BLOOD PRESSURE: 76 MMHG

## 2020-01-14 RX ADMIN — CARBIDOPA AND LEVODOPA SCH TAB: 25; 100 TABLET ORAL at 09:25

## 2020-01-14 RX ADMIN — MEMANTINE HYDROCHLORIDE SCH MG: 10 TABLET ORAL at 09:20

## 2020-01-14 RX ADMIN — TRAZODONE HYDROCHLORIDE SCH MG: 100 TABLET ORAL at 19:52

## 2020-01-14 RX ADMIN — CARBIDOPA AND LEVODOPA SCH TAB.SA: 25; 100 TABLET, EXTENDED RELEASE ORAL at 19:51

## 2020-01-14 RX ADMIN — METOPROLOL SUCCINATE SCH MG: 50 TABLET, EXTENDED RELEASE ORAL at 09:22

## 2020-01-14 RX ADMIN — MIRTAZAPINE SCH MG: 15 TABLET, ORALLY DISINTEGRATING ORAL at 19:52

## 2020-01-14 RX ADMIN — CARBIDOPA AND LEVODOPA SCH TAB: 25; 100 TABLET ORAL at 18:01

## 2020-01-14 RX ADMIN — CARBIDOPA AND LEVODOPA SCH TAB: 25; 100 TABLET ORAL at 22:00

## 2020-01-14 RX ADMIN — ENTACAPONE SCH MG: 200 TABLET, FILM COATED ORAL at 09:20

## 2020-01-14 RX ADMIN — POLYETHYLENE GLYCOL 3350 SCH GM: 17 POWDER, FOR SOLUTION ORAL at 09:19

## 2020-01-14 RX ADMIN — FINASTERIDE SCH MG: 5 TABLET, FILM COATED ORAL at 09:21

## 2020-01-14 RX ADMIN — Medication SCH MCG: at 09:21

## 2020-01-14 RX ADMIN — ENTACAPONE SCH MG: 200 TABLET, FILM COATED ORAL at 20:39

## 2020-01-14 RX ADMIN — RIVASTIGMINE SCH PATCH: 13.3 PATCH, EXTENDED RELEASE TRANSDERMAL at 09:18

## 2020-01-14 RX ADMIN — CARBIDOPA AND LEVODOPA SCH TAB: 25; 100 TABLET ORAL at 15:24

## 2020-01-14 RX ADMIN — MELOXICAM SCH MG: 7.5 TABLET ORAL at 09:25

## 2020-01-14 RX ADMIN — ACETAMINOPHEN PRN MG: 325 TABLET, FILM COATED ORAL at 00:00

## 2020-01-14 RX ADMIN — LACTULOSE SCH GM: 20 SOLUTION ORAL at 09:19

## 2020-01-14 RX ADMIN — POLYVINYL ALCOHOL PRN DROP: 14 SOLUTION/ DROPS OPHTHALMIC at 09:17

## 2020-01-14 RX ADMIN — POLYETHYLENE GLYCOL 3350 SCH GM: 17 POWDER, FOR SOLUTION ORAL at 19:51

## 2020-01-14 RX ADMIN — MEMANTINE HYDROCHLORIDE SCH MG: 10 TABLET ORAL at 19:51

## 2020-01-14 RX ADMIN — CARBIDOPA AND LEVODOPA SCH TAB: 25; 100 TABLET ORAL at 05:22

## 2020-01-14 RX ADMIN — CITALOPRAM HYDROBROMIDE SCH MG: 20 TABLET ORAL at 09:21

## 2020-01-14 RX ADMIN — Medication SCH MG: at 09:19

## 2020-01-14 RX ADMIN — MELATONIN TAB 3 MG SCH MG: 3 TAB at 19:51

## 2020-01-14 NOTE — PDOC
Exam


Note:


Aren Note:


Please also refer to the separate dictated note~for this date of service 

dictated separately.~Patient seen individually. Discussed the patient with 

Nursing staff reviewed the chart.~Reviewed interim history and current 

functioning. Reviewed vital signs,~Labs/ Radiology~and current medications noted

below. Continue current treatment with the changes noted in the dictated 

addendum note





Assessment:


Vital Signs/I&O:





                                   Vital Signs








  Date Time  Temp Pulse Resp B/P (MAP) Pulse Ox O2 Delivery O2 Flow Rate FiO2


 


1/14/20 15:45 97.4 95 18 138/83 (101) 96   


 


1/13/20 15:50      Room Air  














                                    I & O   


 


 1/13/20 1/13/20 1/14/20





 15:00 23:00 07:00


 


Intake Total 720 ml 600 ml 


 


Balance 720 ml 600 ml 











Current Medications:


Meds:





Current Medications








 Medications


  (Trade)  Dose


 Ordered  Sig/Elijah


 Route


 PRN Reason  Start Time


 Stop Time Status Last Admin


Dose Admin


 


 Clozapine


  (Clozaril)  37.5 mg  DAILY


 PO


   1/14/20 09:00


    1/14/20 09:22











I have reviewed the current psychotropics carefully including drug interactions.

 Risk benefit ratio favors no change other than as noted in my dictated progress

note.





Diagnosis:


Problems:  


(1) Anxiety disorder


(2) Impulse control disorder


(3) Lewy body dementia with behavioral disturbance


(4) Major neurocognitive disorder, due to vascular disease, with behavioral 

disturbance, mild











MIRIAM JACOBS MD                 Jan 14, 2020 21:16

## 2020-01-14 NOTE — PN
DATE:  01/12/2020



PSYCHIATRIC PROGRESS NOTE



This late entry 01/12/2020 covers elements not covered in my initial note.



SUBJECTIVE:  I met with the patient evening of 01/12/2020.  Per TARIK Ovalle, the

patient slept 7-1/2 hours previous night.  Previous evening, he was restless,

but during the day, 01/12/2020 he has done better.  He remains confused, but

much less psychotic, not aggressive, disruptive.  No PRNs noted to be given.



REVIEW OF SYSTEMS:  Some impairment of ambulation.  No CV, , pulmonary, eye,

ENT system symptoms on review.  Reliability varies.



MENTAL STATUS EXAM:  Oriented to himself and situation.  Speech has some

latency, coherent.  Abstraction fair, computation impaired, language function

intact, attention span short.  Mood and affect showing improvement, less

psychotic on the Clozaril.  We will check CBC, ANC morning of 01/13/2020, then

decide on the dosage of Clozaril further.



LABORATORY DATA:  Further labs reviewed.



IMPRESSION:  Major neurocognitive disorder; Lewy body with delusion; depression;

behavioral disturbance; anxiety disorder, unspecified; impulse control disorder,

unspecified.  Rest unchanged.



PLAN:  No change from initial note.





______________________________

MIRIAM JACOBS MD



DR:  ISABELA/georgina  JOB#:  [id number] / [id number]

DD:  01/13/2020 16:06  DT:  01/14/2020 01:18

## 2020-01-14 NOTE — PN
DATE:  01/13/2020



PSYCHIATRIC PROGRESS NOTE



This late entry 01/13/2020 covers elements not covered in my initial note.



SUBJECTIVE:  I met with the patient evening of 01/13/2020.  Per Zakia RN, the

patient slept 6-1/4 hours previous night.  He has been doing much better. 

Nursing staff report that he has been very "cute" not aggressive, less

psychotic, pleasant, did well previous night as well.  Wandering at times in

line of sight, has been stopped as his ambulation is better, compliant with

treatment.  He asked for a Coke and quite appropriately tagged the staff

members, all of which is an improvement with good progress.  CBC, absolute

neutrophil count unremarkable.  WBC 4.6, neutrophils 68%.  Ativan IM will be

stopped.



REVIEW OF SYSTEMS:  No CV, , pulmonary, eye, ENT system symptoms on review. 

Reliability poor.



MENTAL STATUS EXAM:  Oriented to himself and situation.  Speech is coherent, has

some latency.  Abstraction fair, computation impaired, language function intact.

 Mood and affect somewhat withdrawn at times, anxious.



LABORATORY DATA:  Reviewed.  Less psychotic.



IMPRESSION:  Major neurocognitive disorder, Lewy body with delusion, depression,

behavioral disturbance.



PLAN:  Stop the IM Ativan.  Increase Clozaril to 37.5 mg a.m., 50 mg at bedtime.

 Rest unchanged for now.





______________________________

MAN BRANDIN JACOBS MD



DR:  ISABELA/georgina  JOB#:  [id number] / [id number]

DD:  01/14/2020 16:25  DT:  01/14/2020 21:55

## 2020-01-15 VITALS — SYSTOLIC BLOOD PRESSURE: 107 MMHG | DIASTOLIC BLOOD PRESSURE: 62 MMHG

## 2020-01-15 VITALS — SYSTOLIC BLOOD PRESSURE: 141 MMHG | DIASTOLIC BLOOD PRESSURE: 79 MMHG

## 2020-01-15 VITALS — DIASTOLIC BLOOD PRESSURE: 44 MMHG | SYSTOLIC BLOOD PRESSURE: 79 MMHG

## 2020-01-15 RX ADMIN — RIVASTIGMINE SCH PATCH: 13.3 PATCH, EXTENDED RELEASE TRANSDERMAL at 08:17

## 2020-01-15 RX ADMIN — Medication SCH MG: at 08:20

## 2020-01-15 RX ADMIN — LACTULOSE SCH GM: 20 SOLUTION ORAL at 08:21

## 2020-01-15 RX ADMIN — CITALOPRAM HYDROBROMIDE SCH MG: 20 TABLET ORAL at 08:19

## 2020-01-15 RX ADMIN — MIRTAZAPINE SCH MG: 15 TABLET, ORALLY DISINTEGRATING ORAL at 19:29

## 2020-01-15 RX ADMIN — MEMANTINE HYDROCHLORIDE SCH MG: 10 TABLET ORAL at 19:28

## 2020-01-15 RX ADMIN — Medication SCH MCG: at 08:17

## 2020-01-15 RX ADMIN — CARBIDOPA AND LEVODOPA SCH TAB: 25; 100 TABLET ORAL at 05:51

## 2020-01-15 RX ADMIN — CARBIDOPA AND LEVODOPA SCH TAB: 25; 100 TABLET ORAL at 15:00

## 2020-01-15 RX ADMIN — MELOXICAM SCH MG: 7.5 TABLET ORAL at 08:18

## 2020-01-15 RX ADMIN — POLYETHYLENE GLYCOL 3350 SCH GM: 17 POWDER, FOR SOLUTION ORAL at 19:28

## 2020-01-15 RX ADMIN — FINASTERIDE SCH MG: 5 TABLET, FILM COATED ORAL at 08:18

## 2020-01-15 RX ADMIN — POLYETHYLENE GLYCOL 3350 SCH GM: 17 POWDER, FOR SOLUTION ORAL at 08:21

## 2020-01-15 RX ADMIN — CARBIDOPA AND LEVODOPA SCH TAB.SA: 25; 100 TABLET, EXTENDED RELEASE ORAL at 19:28

## 2020-01-15 RX ADMIN — CARBIDOPA AND LEVODOPA SCH TAB: 25; 100 TABLET ORAL at 22:28

## 2020-01-15 RX ADMIN — CARBIDOPA AND LEVODOPA SCH TAB: 25; 100 TABLET ORAL at 09:53

## 2020-01-15 RX ADMIN — TRAZODONE HYDROCHLORIDE SCH MG: 100 TABLET ORAL at 19:28

## 2020-01-15 RX ADMIN — ENTACAPONE SCH MG: 200 TABLET, FILM COATED ORAL at 19:29

## 2020-01-15 RX ADMIN — MELATONIN TAB 3 MG SCH MG: 3 TAB at 19:28

## 2020-01-15 RX ADMIN — CARBIDOPA AND LEVODOPA SCH TAB: 25; 100 TABLET ORAL at 17:41

## 2020-01-15 RX ADMIN — ENTACAPONE SCH MG: 200 TABLET, FILM COATED ORAL at 08:19

## 2020-01-15 RX ADMIN — METOPROLOL SUCCINATE SCH MG: 50 TABLET, EXTENDED RELEASE ORAL at 08:18

## 2020-01-15 RX ADMIN — MEMANTINE HYDROCHLORIDE SCH MG: 10 TABLET ORAL at 08:18

## 2020-01-15 NOTE — PDOC
Exam


Note:


Aren Note:


Please also refer to the separate dictated note~for this date of service 

dictated separately.~Patient seen individually. Discussed the patient with 

Nursing staff reviewed the chart.~Reviewed interim history and current 

functioning. Reviewed vital signs,~Labs/ Radiology~and current medications noted

below. Continue current treatment with the changes noted in the dictated 

addendum note





Assessment:


Vital Signs/I&O:





                                   Vital Signs








  Date Time  Temp Pulse Resp B/P (MAP) Pulse Ox O2 Delivery O2 Flow Rate FiO2


 


1/15/20 15:54 98.0 73 20 107/62 (77) 96 Room Air  














                                    I & O   


 


 1/14/20 1/14/20 1/15/20





 15:00 23:00 07:00


 


Intake Total 840 ml 720 ml 


 


Balance 840 ml 720 ml 











Current Medications:


I have reviewed the current psychotropics carefully including drug interactions.

 Risk benefit ratio favors no change other than as noted in my dictated progress

note.





Diagnosis:


Problems:  


(1) Anxiety disorder


(2) Impulse control disorder


(3) Lewy body dementia with behavioral disturbance


(4) Major neurocognitive disorder, due to vascular disease, with behavioral 

disturbance, mild











MIRIAM JACOBS MD                 Bryn 15, 2020 22:03

## 2020-01-15 NOTE — PN
DATE:  01/14/2020



PSYCHIATRIC  PROGRESS NOTE



This late entry 01/14/2020 covers elements not covered in my initial note.



SUBJECTIVE:  I met with the patient evening of 01/14/2020.  Per TARIK Perez,

the patient slept 6-1/2 hours previous night.  He has had a much better day on

01/14/2020.  He has appeared more oriented, calmer, compliant with medications,

took some naps, does complain of dry eyes and received methylcellulose eye drops

for this.



REVIEW OF SYSTEMS:  No CV, , pulmonary, eye system symptoms on review.  Gait

unsteady at times, but better than before.  No falls noted.



MENTAL STATUS EXAM:  Oriented to himself and situation.  Speech has some

latency, coherent.  Abstraction fair, computation impaired, language function

intact, attention span short.  Psychotic symptoms seemed to have subsided.



LABORATORY DATA:  Reviewed.



IMPRESSION:  Unchanged from initial note.



PLAN:  No change from initial note.  He does seem to be finally responding to

the increase of the Clozaril and tolerating it well while the other

psychotropics mentioned in my initial note remain unchanged.





______________________________

MAN BRANDIN JACOBS MD



DR:  ISABELA/georgina  JOB#:  [id number] / [id number]

DD:  01/15/2020 12:39  DT:  01/15/2020 21:46

## 2020-01-16 VITALS — SYSTOLIC BLOOD PRESSURE: 121 MMHG | DIASTOLIC BLOOD PRESSURE: 77 MMHG

## 2020-01-16 VITALS — SYSTOLIC BLOOD PRESSURE: 110 MMHG | DIASTOLIC BLOOD PRESSURE: 82 MMHG

## 2020-01-16 RX ADMIN — CARBIDOPA AND LEVODOPA SCH TAB: 25; 100 TABLET ORAL at 05:48

## 2020-01-16 RX ADMIN — Medication SCH MG: at 09:53

## 2020-01-16 RX ADMIN — CARBIDOPA AND LEVODOPA SCH TAB: 25; 100 TABLET ORAL at 13:53

## 2020-01-16 RX ADMIN — POLYETHYLENE GLYCOL 3350 SCH GM: 17 POWDER, FOR SOLUTION ORAL at 09:57

## 2020-01-16 RX ADMIN — ENTACAPONE SCH MG: 200 TABLET, FILM COATED ORAL at 10:00

## 2020-01-16 RX ADMIN — CARBIDOPA AND LEVODOPA SCH TAB: 25; 100 TABLET ORAL at 18:47

## 2020-01-16 RX ADMIN — FINASTERIDE SCH MG: 5 TABLET, FILM COATED ORAL at 09:56

## 2020-01-16 RX ADMIN — CARBIDOPA AND LEVODOPA SCH TAB: 25; 100 TABLET ORAL at 22:00

## 2020-01-16 RX ADMIN — LACTULOSE SCH GM: 20 SOLUTION ORAL at 09:54

## 2020-01-16 RX ADMIN — MEMANTINE HYDROCHLORIDE SCH MG: 10 TABLET ORAL at 09:54

## 2020-01-16 RX ADMIN — RIVASTIGMINE SCH PATCH: 13.3 PATCH, EXTENDED RELEASE TRANSDERMAL at 09:57

## 2020-01-16 RX ADMIN — METOPROLOL SUCCINATE SCH MG: 25 TABLET, EXTENDED RELEASE ORAL at 10:00

## 2020-01-16 RX ADMIN — CARBIDOPA AND LEVODOPA SCH TAB.SA: 25; 100 TABLET, EXTENDED RELEASE ORAL at 19:45

## 2020-01-16 RX ADMIN — MELOXICAM SCH MG: 7.5 TABLET ORAL at 09:56

## 2020-01-16 RX ADMIN — MEMANTINE HYDROCHLORIDE SCH MG: 10 TABLET ORAL at 19:45

## 2020-01-16 RX ADMIN — POLYETHYLENE GLYCOL 3350 SCH GM: 17 POWDER, FOR SOLUTION ORAL at 19:44

## 2020-01-16 RX ADMIN — TRAZODONE HYDROCHLORIDE SCH MG: 100 TABLET ORAL at 19:45

## 2020-01-16 RX ADMIN — Medication SCH MCG: at 09:55

## 2020-01-16 RX ADMIN — ENTACAPONE SCH MG: 200 TABLET, FILM COATED ORAL at 19:44

## 2020-01-16 RX ADMIN — TRAZODONE HYDROCHLORIDE PRN MG: 100 TABLET ORAL at 02:25

## 2020-01-16 RX ADMIN — MIRTAZAPINE SCH MG: 15 TABLET, ORALLY DISINTEGRATING ORAL at 19:45

## 2020-01-16 RX ADMIN — CARBIDOPA AND LEVODOPA SCH TAB: 25; 100 TABLET ORAL at 10:01

## 2020-01-16 RX ADMIN — CHOLECALCIFEROL CAP 1.25 MG (50000 UNIT) SCH UNIT: 1.25 CAP at 09:59

## 2020-01-16 RX ADMIN — CITALOPRAM HYDROBROMIDE SCH MG: 20 TABLET ORAL at 09:55

## 2020-01-16 RX ADMIN — MELATONIN TAB 3 MG SCH MG: 3 TAB at 19:45

## 2020-01-16 NOTE — PDOC
Exam


Note:


Aren Note:


Please also refer to the separate dictated note~for this date of service 

dictated separately.~Patient seen individually. Discussed the patient with 

Nursing staff reviewed the chart.~Reviewed interim history and current 

functioning. Reviewed vital signs,~Labs/ Radiology~and current medications noted

below. Continue current treatment with the changes noted in the dictated 

addendum note





Assessment:


Vital Signs/I&O:





                                   Vital Signs








  Date Time  Temp Pulse Resp B/P (MAP) Pulse Ox O2 Delivery O2 Flow Rate FiO2


 


1/16/20 16:07 97.7 74 18 121/77 (92) 97 Room Air  














                                    I & O   


 


 1/15/20 1/15/20 1/16/20





 15:00 23:00 07:00


 


Intake Total 840 ml 720 ml 


 


Balance 840 ml 720 ml 











Current Medications:


Meds:





Current Medications








 Medications


  (Trade)  Dose


 Ordered  Sig/Elijah


 Route


 PRN Reason  Start Time


 Stop Time Status Last Admin


Dose Admin


 


 Metoprolol


 Succinate


  (Toprol Xl)  25 mg  DAILY


 PO


   1/16/20 09:00


    1/16/20 10:00











I have reviewed the current psychotropics carefully including drug interactions.

 Risk benefit ratio favors no change other than as noted in my dictated progress

note.





Diagnosis:


Problems:  


(1) Anxiety disorder


(2) Impulse control disorder


(3) Lewy body dementia with behavioral disturbance


(4) Major neurocognitive disorder, due to vascular disease, with behavioral 

disturbance, mild











MIRIAM JACOBS MD                 Jan 16, 2020 20:57

## 2020-01-17 VITALS — SYSTOLIC BLOOD PRESSURE: 95 MMHG | DIASTOLIC BLOOD PRESSURE: 61 MMHG

## 2020-01-17 VITALS — SYSTOLIC BLOOD PRESSURE: 123 MMHG | DIASTOLIC BLOOD PRESSURE: 72 MMHG

## 2020-01-17 RX ADMIN — FINASTERIDE SCH MG: 5 TABLET, FILM COATED ORAL at 08:10

## 2020-01-17 RX ADMIN — Medication SCH MG: at 08:12

## 2020-01-17 RX ADMIN — POLYETHYLENE GLYCOL 3350 SCH GM: 17 POWDER, FOR SOLUTION ORAL at 19:51

## 2020-01-17 RX ADMIN — MEMANTINE HYDROCHLORIDE SCH MG: 10 TABLET ORAL at 08:11

## 2020-01-17 RX ADMIN — Medication SCH MCG: at 08:11

## 2020-01-17 RX ADMIN — TRAZODONE HYDROCHLORIDE SCH MG: 100 TABLET ORAL at 19:46

## 2020-01-17 RX ADMIN — CARBIDOPA AND LEVODOPA SCH TAB: 25; 100 TABLET ORAL at 19:46

## 2020-01-17 RX ADMIN — CARBIDOPA AND LEVODOPA SCH TAB: 25; 100 TABLET ORAL at 21:17

## 2020-01-17 RX ADMIN — MELOXICAM SCH MG: 7.5 TABLET ORAL at 08:10

## 2020-01-17 RX ADMIN — CARBIDOPA AND LEVODOPA SCH TAB.SA: 25; 100 TABLET, EXTENDED RELEASE ORAL at 19:46

## 2020-01-17 RX ADMIN — CITALOPRAM HYDROBROMIDE SCH MG: 20 TABLET ORAL at 08:10

## 2020-01-17 RX ADMIN — ENTACAPONE SCH MG: 200 TABLET, FILM COATED ORAL at 19:45

## 2020-01-17 RX ADMIN — LACTULOSE SCH GM: 20 SOLUTION ORAL at 08:09

## 2020-01-17 RX ADMIN — MIRTAZAPINE SCH MG: 15 TABLET, ORALLY DISINTEGRATING ORAL at 19:46

## 2020-01-17 RX ADMIN — MEMANTINE HYDROCHLORIDE SCH MG: 10 TABLET ORAL at 19:46

## 2020-01-17 RX ADMIN — RIVASTIGMINE SCH PATCH: 13.3 PATCH, EXTENDED RELEASE TRANSDERMAL at 08:11

## 2020-01-17 RX ADMIN — METOPROLOL SUCCINATE SCH MG: 25 TABLET, EXTENDED RELEASE ORAL at 08:10

## 2020-01-17 RX ADMIN — CARBIDOPA AND LEVODOPA SCH TAB: 25; 100 TABLET ORAL at 13:17

## 2020-01-17 RX ADMIN — MELATONIN TAB 3 MG SCH MG: 3 TAB at 19:46

## 2020-01-17 RX ADMIN — ENTACAPONE SCH MG: 200 TABLET, FILM COATED ORAL at 08:09

## 2020-01-17 RX ADMIN — CARBIDOPA AND LEVODOPA SCH TAB: 25; 100 TABLET ORAL at 09:51

## 2020-01-17 RX ADMIN — CARBIDOPA AND LEVODOPA SCH TAB: 25; 100 TABLET ORAL at 05:51

## 2020-01-17 RX ADMIN — POLYETHYLENE GLYCOL 3350 SCH GM: 17 POWDER, FOR SOLUTION ORAL at 08:09

## 2020-01-17 NOTE — PDOC
Exam


Note:


Aren Note:


Please also refer to the separate dictated note~for this date of service 

dictated separately.~Patient seen individually. Discussed the patient with 

Nursing staff reviewed the chart.~Reviewed interim history and current 

functioning. Reviewed vital signs,~Labs/ Radiology~and current medications noted

below. Continue current treatment with the changes noted in the dictated 

addendum note





Assessment:


Vital Signs/I&O:





                                   Vital Signs








  Date Time  Temp Pulse Resp B/P (MAP) Pulse Ox O2 Delivery O2 Flow Rate FiO2


 


1/17/20 16:22 97.4 66 18 95/61 (72) 97   


 


1/17/20 06:05      Room Air  














                                    I & O   


 


 1/16/20 1/16/20 1/17/20





 15:00 23:00 07:00


 


Intake Total 1300 ml 360 ml 0 ml


 


Balance 1300 ml 360 ml 0 ml











Current Medications:


I have reviewed the current psychotropics carefully including drug interactions.

 Risk benefit ratio favors no change other than as noted in my dictated progress

note.





Diagnosis:


Problems:  


(1) Anxiety disorder


(2) Impulse control disorder


(3) Lewy body dementia with behavioral disturbance


(4) Major neurocognitive disorder, due to vascular disease, with behavioral 

disturbance, mild











MIRIAM JACOBS MD                 Jan 17, 2020 20:55

## 2020-01-17 NOTE — PN
DATE:  01/15/2020



This late entry, 01/15/2020, covers the elements not covered in my initial note.



SUBJECTIVE:  I met with the patient in the evening.  Per TARIK Perez, patient

slept reasonably previous night.  He was complaining of some dizziness before

lunch.  Blood pressure was low.  Metoprolol has been reduced and he has been

better since then.  Absolute neutrophil count is 3128 on the Clozaril.



REVIEW OF SYSTEMS:  No CV, , pulmonary, eye, or ENT system symptoms on review.

 Reliability is poor.



MENTAL STATUS EXAM:  Oriented to himself and at times situation.  Speech has

some latency and often responses monosyllabic.  Abstraction is fair, computation

is impaired, and language function is intact.  Mood and affect are improved,

less labile, less psychotic, not yelling, not aggressive, disruptive, or

breaking down doors, all of which he was doing a few days back.  The Clozaril

really seems to have helped him and we will continue unchanged.



LABS:  Reviewed.



IMPRESSION:  Major neurocognitive disorder, Lewy body with delusion, depression,

and behavioral disturbance.  Rest is unchanged.



PLAN:  No change from initial note and monitor the CBC and absolute neutrophil

count on Clozaril and increase the Clozaril as clinically indicated.





______________________________

MIRIAM JACOBS MD



DR:  ISABELA/georgina  JOB#:  [id number] / [id number]

DD:  01/17/2020 11:38  DT:  01/17/2020 16:15

## 2020-01-17 NOTE — PN
DATE:  01/16/2020



PSYCHIATRIC  PROGRESS NOTE



This late entry 01/16/2020 covers elements not covered in my initial note.



SUBJECTIVE:  I met with the patient evening of 01/16/2020.  Per TARIK Perez,

the patient slept 4-1/4 hours previous night.  He had a good morning.  In the

afternoon after lunch he appeared "wild eyed" per nursing staff.  He was not

aggressive, but more anxious, received IM Ativan at 2:00 p.m., did better after

that.  His daughter visited.  He has had some drooling and we will monitor this.

 I have carefully reviewed his psychotropics, perhaps the Clozaril could be

contributing to this, but therapeutically it has been very beneficial for him

and we will maintain Clozaril unchanged.



REVIEW OF SYSTEMS:  No CV, , pulmonary, eye, ENT system symptoms on review. 

Reliability varies.



MENTAL STATUS EXAM:  Oriented to himself and situation.  Speech has some

latency, often responses monosyllabic, coherent.  Abstraction fair, computation

impaired, language function intact.  Mood and affect at times withdrawn.



LABORATORY DATA:  Reviewed.



IMPRESSION:  Unchanged from initial note.



PLAN:  No change from initial note.





______________________________

MAN BRANDIN JACOBS MD



DR:  ISABELA/georgina  JOB#:  [id number] / [id number]

DD:  01/17/2020 11:40  DT:  01/17/2020 16:14

## 2020-01-18 VITALS — DIASTOLIC BLOOD PRESSURE: 89 MMHG | SYSTOLIC BLOOD PRESSURE: 145 MMHG

## 2020-01-18 VITALS — SYSTOLIC BLOOD PRESSURE: 131 MMHG | DIASTOLIC BLOOD PRESSURE: 78 MMHG

## 2020-01-18 RX ADMIN — CARBIDOPA AND LEVODOPA SCH TAB: 25; 100 TABLET ORAL at 05:14

## 2020-01-18 RX ADMIN — CARBIDOPA AND LEVODOPA SCH TAB: 25; 100 TABLET ORAL at 18:00

## 2020-01-18 RX ADMIN — POLYETHYLENE GLYCOL 3350 SCH GM: 17 POWDER, FOR SOLUTION ORAL at 08:19

## 2020-01-18 RX ADMIN — MELATONIN TAB 3 MG SCH MG: 3 TAB at 20:20

## 2020-01-18 RX ADMIN — MEMANTINE HYDROCHLORIDE SCH MG: 10 TABLET ORAL at 08:19

## 2020-01-18 RX ADMIN — FINASTERIDE SCH MG: 5 TABLET, FILM COATED ORAL at 08:18

## 2020-01-18 RX ADMIN — CARBIDOPA AND LEVODOPA SCH TAB: 25; 100 TABLET ORAL at 20:21

## 2020-01-18 RX ADMIN — ENTACAPONE SCH MG: 200 TABLET, FILM COATED ORAL at 20:21

## 2020-01-18 RX ADMIN — CARBIDOPA AND LEVODOPA SCH TAB.SA: 25; 100 TABLET, EXTENDED RELEASE ORAL at 20:20

## 2020-01-18 RX ADMIN — LACTULOSE SCH GM: 20 SOLUTION ORAL at 08:18

## 2020-01-18 RX ADMIN — TRAZODONE HYDROCHLORIDE SCH MG: 100 TABLET ORAL at 20:21

## 2020-01-18 RX ADMIN — Medication SCH MG: at 08:13

## 2020-01-18 RX ADMIN — CARBIDOPA AND LEVODOPA SCH TAB: 25; 100 TABLET ORAL at 10:00

## 2020-01-18 RX ADMIN — CITALOPRAM HYDROBROMIDE SCH MG: 20 TABLET ORAL at 08:17

## 2020-01-18 RX ADMIN — MELOXICAM SCH MG: 7.5 TABLET ORAL at 08:19

## 2020-01-18 RX ADMIN — POLYETHYLENE GLYCOL 3350 SCH GM: 17 POWDER, FOR SOLUTION ORAL at 20:22

## 2020-01-18 RX ADMIN — MEMANTINE HYDROCHLORIDE SCH MG: 10 TABLET ORAL at 20:21

## 2020-01-18 RX ADMIN — ENTACAPONE SCH MG: 200 TABLET, FILM COATED ORAL at 08:19

## 2020-01-18 RX ADMIN — Medication SCH MCG: at 08:19

## 2020-01-18 RX ADMIN — RIVASTIGMINE SCH PATCH: 13.3 PATCH, EXTENDED RELEASE TRANSDERMAL at 08:20

## 2020-01-18 RX ADMIN — METOPROLOL SUCCINATE SCH MG: 25 TABLET, EXTENDED RELEASE ORAL at 08:19

## 2020-01-18 RX ADMIN — MIRTAZAPINE SCH MG: 15 TABLET, ORALLY DISINTEGRATING ORAL at 20:20

## 2020-01-18 RX ADMIN — CARBIDOPA AND LEVODOPA SCH TAB: 25; 100 TABLET ORAL at 14:00

## 2020-01-18 NOTE — PDOC
Exam


Note:


Aren Note:


Please also refer to the separate dictated note~for this date of service 

dictated separately.~Patient seen individually. Discussed the patient with 

Nursing staff reviewed the chart.~Reviewed interim history and current 

functioning. Reviewed vital signs,~Labs/ Radiology~and current medications noted

below. Continue current treatment with the changes noted in the dictated 

addendum note





Assessment:


Vital Signs/I&O:





                                   Vital Signs








  Date Time  Temp Pulse Resp B/P (MAP) Pulse Ox O2 Delivery O2 Flow Rate FiO2


 


1/18/20 15:24 97.3 71 16 131/78 (95) 97   


 


1/18/20 06:04      Room Air  














                                    I & O   


 


 1/17/20 1/17/20 1/18/20





 15:00 23:00 07:00


 


Intake Total 840 ml 360 ml 240 ml


 


Balance 840 ml 360 ml 240 ml











Current Medications:


I have reviewed the current psychotropics carefully including drug interactions.

 Risk benefit ratio favors no change other than as noted in my dictated progress

note.





Diagnosis:


Problems:  


(1) Anxiety disorder


(2) Impulse control disorder


(3) Lewy body dementia with behavioral disturbance


(4) Major neurocognitive disorder, due to vascular disease, with behavioral 

disturbance, mild











MIRIAM JACOBS MD                 Jan 18, 2020 20:52

## 2020-01-19 VITALS — DIASTOLIC BLOOD PRESSURE: 88 MMHG | SYSTOLIC BLOOD PRESSURE: 156 MMHG

## 2020-01-19 VITALS — SYSTOLIC BLOOD PRESSURE: 110 MMHG | DIASTOLIC BLOOD PRESSURE: 63 MMHG

## 2020-01-19 RX ADMIN — FINASTERIDE SCH MG: 5 TABLET, FILM COATED ORAL at 08:44

## 2020-01-19 RX ADMIN — RIVASTIGMINE SCH PATCH: 13.3 PATCH, EXTENDED RELEASE TRANSDERMAL at 08:43

## 2020-01-19 RX ADMIN — CARBIDOPA AND LEVODOPA SCH TAB: 25; 100 TABLET ORAL at 20:07

## 2020-01-19 RX ADMIN — CARBIDOPA AND LEVODOPA SCH TAB: 25; 100 TABLET ORAL at 05:50

## 2020-01-19 RX ADMIN — Medication SCH MG: at 08:45

## 2020-01-19 RX ADMIN — CITALOPRAM HYDROBROMIDE SCH MG: 20 TABLET ORAL at 08:44

## 2020-01-19 RX ADMIN — Medication SCH MCG: at 08:44

## 2020-01-19 RX ADMIN — ENTACAPONE SCH MG: 200 TABLET, FILM COATED ORAL at 20:07

## 2020-01-19 RX ADMIN — POLYETHYLENE GLYCOL 3350 SCH GM: 17 POWDER, FOR SOLUTION ORAL at 08:43

## 2020-01-19 RX ADMIN — LACTULOSE SCH GM: 20 SOLUTION ORAL at 08:43

## 2020-01-19 RX ADMIN — CARBIDOPA AND LEVODOPA SCH TAB: 25; 100 TABLET ORAL at 14:00

## 2020-01-19 RX ADMIN — METOPROLOL SUCCINATE SCH MG: 25 TABLET, EXTENDED RELEASE ORAL at 08:44

## 2020-01-19 RX ADMIN — TRAZODONE HYDROCHLORIDE SCH MG: 100 TABLET ORAL at 20:07

## 2020-01-19 RX ADMIN — MEMANTINE HYDROCHLORIDE SCH MG: 10 TABLET ORAL at 08:44

## 2020-01-19 RX ADMIN — MELOXICAM SCH MG: 7.5 TABLET ORAL at 08:44

## 2020-01-19 RX ADMIN — CARBIDOPA AND LEVODOPA SCH TAB: 25; 100 TABLET ORAL at 17:46

## 2020-01-19 RX ADMIN — POLYETHYLENE GLYCOL 3350 SCH GM: 17 POWDER, FOR SOLUTION ORAL at 20:07

## 2020-01-19 RX ADMIN — ENTACAPONE SCH MG: 200 TABLET, FILM COATED ORAL at 08:44

## 2020-01-19 RX ADMIN — MIRTAZAPINE SCH MG: 15 TABLET, ORALLY DISINTEGRATING ORAL at 20:06

## 2020-01-19 RX ADMIN — MELATONIN TAB 3 MG SCH MG: 3 TAB at 20:06

## 2020-01-19 RX ADMIN — CARBIDOPA AND LEVODOPA SCH TAB: 25; 100 TABLET ORAL at 10:00

## 2020-01-19 RX ADMIN — MEMANTINE HYDROCHLORIDE SCH MG: 10 TABLET ORAL at 20:07

## 2020-01-19 RX ADMIN — CARBIDOPA AND LEVODOPA SCH TAB.SA: 25; 100 TABLET, EXTENDED RELEASE ORAL at 20:07

## 2020-01-19 NOTE — PN
DATE:  01/18/2020



PSYCHIATRIC PROGRESS NOTE



This late entry 01/18/2020 covers elements not covered in my initial note.



SUBJECTIVE:  I met with the patient evening of 01/18/2020.  The patient slept 6

hours previous night.  He was agitated previous evening, was awake in the middle

of the night, still confused, saying he was going to slap nursing staff then

went to bed again.  He handled the visit with the family very well and then took

the aid of another patient on the unit who is not demented and use the telephone

on the unit to call his wife.  He had a good conversation, the wife was very

pleased with his progress per nursing report.



REVIEW OF SYSTEMS:  No CV, , pulmonary, eye, ENT system symptoms on review.



MENTAL STATUS EXAM:  Reasonably oriented to himself and situation.  Speech

coherent, has some latency.  Abstraction fair, computation impaired, language

function intact.  Mood and affect improved.



LABORATORY DATA:  Reviewed.  Less psychotic.



IMPRESSION:  Unchanged from initial note.



PLAN:  No change from initial note.





______________________________

MIRIAM JACOBS MD



DR:  ISABELA/georgina  JOB#:  [id number] / [id number]

DD:  01/19/2020 19:49  DT:  01/19/2020 23:39

## 2020-01-19 NOTE — PN
DATE:  01/17/2020



PSYCHIATRIC PROGRESS NOTE



This late entry 01/17/2020 covers elements not covered in my initial note.



SUBJECTIVE:  I met with the patient evening of 01/17/2020.  Per TARIK Christina, the

patient slept 7-1/4 hours previous night.  He has had a good day, has been in

the day room attending groups and activities, much more coherent, appropriate,

not aggressive, slept much, improved in his psychosis.



REVIEW OF SYSTEMS:  No CV, , pulmonary, eye system symptoms on review.



MENTAL STATUS EXAM:  Oriented to himself and situation.  Speech has some

latency, coherent.  Abstraction fair, computation impaired, language function

intact.  Mood and affect less withdrawn.



LABORATORY DATA:  Reviewed.



IMPRESSION:  Major neurocognitive disorder, Lewy body with delusion, depression,

behavioral disturbance.  Rest unchanged.



PLAN:  No change from initial note.  Continue Clozaril, which has been very

helpful with his psychosis.  Rest unchanged.





______________________________

MIRIAM JACOBS MD



DR:  ISABELA/georgina  JOB#:  [id number] / [id number]

DD:  01/19/2020 19:47  DT:  01/19/2020 23:34

## 2020-01-19 NOTE — PDOC
Exam


Note:


Aren Note:


Please also refer to the separate dictated note~for this date of service 

dictated separately.~Patient seen individually. Discussed the patient with 

Nursing staff reviewed the chart.~Reviewed interim history and current 

functioning. Reviewed vital signs,~Labs/ Radiology~and current medications noted

below. Continue current treatment with the changes noted in the dictated 

addendum note





Assessment:


Vital Signs/I&O:





                                   Vital Signs








  Date Time  Temp Pulse Resp B/P (MAP) Pulse Ox O2 Delivery O2 Flow Rate FiO2


 


1/19/20 15:28 97.4 73 20 110/63 (79) 97   


 


1/18/20 06:04      Room Air  














                                    I & O   


 


 1/18/20 1/18/20 1/19/20





 15:00 23:00 07:00


 


Intake Total 840 ml 480 ml 


 


Balance 840 ml 480 ml 











Current Medications:


I have reviewed the current psychotropics carefully including drug interactions.

 Risk benefit ratio favors no change other than as noted in my dictated progress

note.





Diagnosis:


Problems:  


(1) Anxiety disorder


(2) Impulse control disorder


(3) Lewy body dementia with behavioral disturbance


(4) Major neurocognitive disorder, due to vascular disease, with behavioral 

disturbance, mild











MIRIAM JACOBS MD                 Jan 19, 2020 20:54

## 2020-01-20 VITALS — DIASTOLIC BLOOD PRESSURE: 84 MMHG | SYSTOLIC BLOOD PRESSURE: 130 MMHG

## 2020-01-20 VITALS — SYSTOLIC BLOOD PRESSURE: 115 MMHG | DIASTOLIC BLOOD PRESSURE: 70 MMHG

## 2020-01-20 LAB
ALBUMIN SERPL-MCNC: 2.6 G/DL (ref 3.4–5)
ALBUMIN/GLOB SERPL: 0.8 {RATIO} (ref 1–1.7)
ALP SERPL-CCNC: 66 U/L (ref 46–116)
ALT SERPL-CCNC: 10 U/L (ref 16–63)
ANION GAP SERPL CALC-SCNC: 5 MMOL/L (ref 6–14)
AST SERPL-CCNC: 13 U/L (ref 15–37)
BASOPHILS # BLD AUTO: 0 X10^3/UL (ref 0–0.2)
BASOPHILS NFR BLD: 1 % (ref 0–3)
BILIRUB SERPL-MCNC: 0.2 MG/DL (ref 0.2–1)
BUN/CREAT SERPL: 31 (ref 6–20)
CA-I SERPL ISE-MCNC: 28 MG/DL (ref 8–26)
CALCIUM SERPL-MCNC: 8.2 MG/DL (ref 8.5–10.1)
CHLORIDE SERPL-SCNC: 109 MMOL/L (ref 98–107)
CO2 SERPL-SCNC: 30 MMOL/L (ref 21–32)
CREAT SERPL-MCNC: 0.9 MG/DL (ref 0.7–1.3)
EOSINOPHIL NFR BLD: 0.8 X10^3/UL (ref 0–0.7)
EOSINOPHIL NFR BLD: 13 % (ref 0–3)
ERYTHROCYTE [DISTWIDTH] IN BLOOD BY AUTOMATED COUNT: 13.6 % (ref 11.5–14.5)
GFR SERPLBLD BASED ON 1.73 SQ M-ARVRAT: 84.2 ML/MIN
GLOBULIN SER-MCNC: 3.1 G/DL (ref 2.2–3.8)
GLUCOSE SERPL-MCNC: 89 MG/DL (ref 70–99)
HCT VFR BLD CALC: 35.6 % (ref 39–53)
HGB BLD-MCNC: 11.7 G/DL (ref 13–17.5)
LYMPHOCYTES # BLD: 0.9 X10^3/UL (ref 1–4.8)
LYMPHOCYTES NFR BLD AUTO: 14 % (ref 24–48)
MCH RBC QN AUTO: 29 PG (ref 25–35)
MCHC RBC AUTO-ENTMCNC: 33 G/DL (ref 31–37)
MCV RBC AUTO: 88 FL (ref 79–100)
MONO #: 0.6 X10^3/UL (ref 0–1.1)
MONOCYTES NFR BLD: 10 % (ref 0–9)
NEUT #: 4.1 X10^3UL (ref 1.8–7.7)
NEUTROPHILS NFR BLD AUTO: 63 % (ref 31–73)
PLATELET # BLD AUTO: 238 X10^3/UL (ref 140–400)
POTASSIUM SERPL-SCNC: 4.4 MMOL/L (ref 3.5–5.1)
PROT SERPL-MCNC: 5.7 G/DL (ref 6.4–8.2)
RBC # BLD AUTO: 4.03 X10^6/UL (ref 4.3–5.7)
SODIUM SERPL-SCNC: 144 MMOL/L (ref 136–145)
WBC # BLD AUTO: 6.5 X10^3/UL (ref 4–11)

## 2020-01-20 RX ADMIN — CARBIDOPA AND LEVODOPA SCH TAB: 25; 100 TABLET ORAL at 10:28

## 2020-01-20 RX ADMIN — RIVASTIGMINE SCH PATCH: 13.3 PATCH, EXTENDED RELEASE TRANSDERMAL at 08:37

## 2020-01-20 RX ADMIN — MIRTAZAPINE SCH MG: 15 TABLET, ORALLY DISINTEGRATING ORAL at 20:35

## 2020-01-20 RX ADMIN — CARBIDOPA AND LEVODOPA SCH TAB: 25; 100 TABLET ORAL at 14:49

## 2020-01-20 RX ADMIN — Medication SCH MCG: at 08:38

## 2020-01-20 RX ADMIN — MELOXICAM SCH MG: 7.5 TABLET ORAL at 08:39

## 2020-01-20 RX ADMIN — CARBIDOPA AND LEVODOPA SCH TAB: 25; 100 TABLET ORAL at 17:42

## 2020-01-20 RX ADMIN — CITALOPRAM HYDROBROMIDE SCH MG: 20 TABLET ORAL at 08:39

## 2020-01-20 RX ADMIN — MEMANTINE HYDROCHLORIDE SCH MG: 10 TABLET ORAL at 20:39

## 2020-01-20 RX ADMIN — Medication SCH MG: at 08:40

## 2020-01-20 RX ADMIN — LACTULOSE SCH GM: 20 SOLUTION ORAL at 08:37

## 2020-01-20 RX ADMIN — POLYETHYLENE GLYCOL 3350 SCH GM: 17 POWDER, FOR SOLUTION ORAL at 20:39

## 2020-01-20 RX ADMIN — ENTACAPONE SCH MG: 200 TABLET, FILM COATED ORAL at 08:39

## 2020-01-20 RX ADMIN — TRAZODONE HYDROCHLORIDE SCH MG: 100 TABLET ORAL at 20:39

## 2020-01-20 RX ADMIN — CARBIDOPA AND LEVODOPA SCH TAB.SA: 25; 100 TABLET, EXTENDED RELEASE ORAL at 20:37

## 2020-01-20 RX ADMIN — MELATONIN TAB 3 MG SCH MG: 3 TAB at 20:39

## 2020-01-20 RX ADMIN — MEMANTINE HYDROCHLORIDE SCH MG: 10 TABLET ORAL at 08:39

## 2020-01-20 RX ADMIN — CARBIDOPA AND LEVODOPA SCH TAB: 25; 100 TABLET ORAL at 22:00

## 2020-01-20 RX ADMIN — POLYETHYLENE GLYCOL 3350 SCH GM: 17 POWDER, FOR SOLUTION ORAL at 08:38

## 2020-01-20 RX ADMIN — ENTACAPONE SCH MG: 200 TABLET, FILM COATED ORAL at 20:38

## 2020-01-20 RX ADMIN — CARBIDOPA AND LEVODOPA SCH TAB: 25; 100 TABLET ORAL at 05:18

## 2020-01-20 RX ADMIN — FINASTERIDE SCH MG: 5 TABLET, FILM COATED ORAL at 08:40

## 2020-01-20 RX ADMIN — METOPROLOL SUCCINATE SCH MG: 25 TABLET, EXTENDED RELEASE ORAL at 08:40

## 2020-01-20 NOTE — PDOC
Exam


Note:


Aren Note:


Please also refer to the separate dictated note~for this date of service 

dictated separately.~Patient seen individually. Discussed the patient with 

Nursing staff reviewed the chart.~Reviewed interim history and current 

functioning. Reviewed vital signs,~Labs/ Radiology~and current medications noted

below. Continue current treatment with the changes noted in the dictated 

addendum note





Assessment:


Vital Signs/I&O:





                                   Vital Signs








  Date Time  Temp Pulse Resp B/P (MAP) Pulse Ox O2 Delivery O2 Flow Rate FiO2


 


1/20/20 15:56 97.9 73 18 115/70 (85) 95   


 


1/18/20 06:04      Room Air  














                                    I & O   


 


 1/19/20 1/19/20 1/20/20





 15:00 23:00 07:00


 


Intake Total 1140 ml 480 ml 120 ml


 


Balance 1140 ml 480 ml 120 ml








Labs:





                                Laboratory Tests








Test


 1/20/20


07:14


 


White Blood Count


 6.5 x10^3/uL


(4.0-11.0)


 


Red Blood Count


 4.03 x10^6/uL


(4.30-5.70)  L


 


Hemoglobin


 11.7 g/dL


(13.0-17.5)  L


 


Hematocrit


 35.6 %


(39.0-53.0)  L


 


Mean Corpuscular Volume


 88 fL ()





 


Mean Corpuscular Hemoglobin 29 pg (25-35)  


 


Mean Corpuscular Hemoglobin


Concent 33 g/dL


(31-37)


 


Red Cell Distribution Width


 13.6 %


(11.5-14.5)


 


Platelet Count


 238 x10^3/uL


(140-400)


 


Neutrophils (%) (Auto) 63 % (31-73)  


 


Lymphocytes (%) (Auto) 14 % (24-48)  L


 


Monocytes (%) (Auto) 10 % (0-9)  H


 


Eosinophils (%) (Auto) 13 % (0-3)  H


 


Basophils (%) (Auto) 1 % (0-3)  


 


Neutrophils # (Auto)


 4.1 x10^3uL


(1.8-7.7)


 


Lymphocytes # (Auto)


 0.9 x10^3/uL


(1.0-4.8)  L


 


Monocytes # (Auto)


 0.6 x10^3/uL


(0.0-1.1)


 


Eosinophils # (Auto)


 0.8 x10^3/uL


(0.0-0.7)  H


 


Basophils # (Auto)


 0.0 x10^3/uL


(0.0-0.2)


 


Sodium Level


 144 mmol/L


(136-145)


 


Potassium Level


 4.4 mmol/L


(3.5-5.1)


 


Chloride Level


 109 mmol/L


()  H


 


Carbon Dioxide Level


 30 mmol/L


(21-32)


 


Anion Gap 5 (6-14)  L


 


Blood Urea Nitrogen


 28 mg/dL


(8-26)  H


 


Creatinine


 0.9 mg/dL


(0.7-1.3)


 


Estimated GFR


(Cockcroft-Gault) 84.2  





 


BUN/Creatinine Ratio 31 (6-20)  H


 


Glucose Level


 89 mg/dL


(70-99)


 


Calcium Level


 8.2 mg/dL


(8.5-10.1)  L


 


Total Bilirubin


 0.2 mg/dL


(0.2-1.0)


 


Aspartate Amino Transferase


(AST) 13 U/L (15-37)


L


 


Alanine Aminotransferase (ALT)


 10 U/L (16-63)


L


 


Alkaline Phosphatase


 66 U/L


()


 


Total Protein


 5.7 g/dL


(6.4-8.2)  L


 


Albumin


 2.6 g/dL


(3.4-5.0)  L


 


Albumin/Globulin Ratio


 0.8 (1.0-1.7)


L











Current Medications:


I have reviewed the current psychotropics carefully including drug interactions.

 Risk benefit ratio favors no change other than as noted in my dictated progress

note.





Diagnosis:


Problems:  


(1) Anxiety disorder


(2) Impulse control disorder


(3) Lewy body dementia with behavioral disturbance


(4) Major neurocognitive disorder, due to vascular disease, with behavioral 

disturbance, mild











MIRIAM JACOBS MD                 Jan 20, 2020 20:52

## 2020-01-20 NOTE — PN
DATE:  01/19/2020



PSYCHIATRIC PROGRESS NOTE



This late entry 01/19/2020 covers elements not covered in my initial note.



SUBJECTIVE:  I met with the patient in the evening.  Per TARIK Lubin, the patient

slept 6-1/4 hours previous night.  He has had a good day, redirectable, less

confused, psychotic symptoms are improved.  He has not been aggressive,

disruptive.



REVIEW OF SYSTEMS:  No CV, , pulmonary, eye, ENT system symptoms on review. 

Reliability varies.



MENTAL STATUS EXAM:  Oriented to himself and situation.  Speech coherent, less

pressured.  Abstraction fair, computation impaired, language function intact. 

Mood and affect is improved.



LABORATORY DATA:  Reviewed.



IMPRESSION:  Unchanged from initial note.



PLAN:  No change from initial note.  May need to increase Clozaril further post

next set of labs.





______________________________

MAN BRANDIN JACOBS MD



DR:  ISABELA/georgina  JOB#:  [id number] / [id number]

DD:  01/20/2020 19:04  DT:  01/20/2020 23:39

## 2020-01-21 VITALS — SYSTOLIC BLOOD PRESSURE: 152 MMHG | DIASTOLIC BLOOD PRESSURE: 82 MMHG

## 2020-01-21 VITALS — DIASTOLIC BLOOD PRESSURE: 63 MMHG | SYSTOLIC BLOOD PRESSURE: 98 MMHG

## 2020-01-21 RX ADMIN — MEMANTINE HYDROCHLORIDE SCH MG: 10 TABLET ORAL at 08:22

## 2020-01-21 RX ADMIN — CARBIDOPA AND LEVODOPA SCH TAB: 25; 100 TABLET ORAL at 06:21

## 2020-01-21 RX ADMIN — CARBIDOPA AND LEVODOPA SCH TAB: 25; 100 TABLET ORAL at 17:30

## 2020-01-21 RX ADMIN — CARBIDOPA AND LEVODOPA SCH TAB: 25; 100 TABLET ORAL at 10:21

## 2020-01-21 RX ADMIN — Medication SCH MCG: at 08:22

## 2020-01-21 RX ADMIN — Medication SCH MG: at 08:20

## 2020-01-21 RX ADMIN — MELATONIN TAB 3 MG SCH MG: 3 TAB at 19:51

## 2020-01-21 RX ADMIN — ENTACAPONE SCH MG: 200 TABLET, FILM COATED ORAL at 08:21

## 2020-01-21 RX ADMIN — CARBIDOPA AND LEVODOPA SCH TAB.SA: 25; 100 TABLET, EXTENDED RELEASE ORAL at 19:52

## 2020-01-21 RX ADMIN — METOPROLOL SUCCINATE SCH MG: 25 TABLET, EXTENDED RELEASE ORAL at 08:23

## 2020-01-21 RX ADMIN — POLYETHYLENE GLYCOL 3350 SCH GM: 17 POWDER, FOR SOLUTION ORAL at 19:51

## 2020-01-21 RX ADMIN — CARBIDOPA AND LEVODOPA SCH TAB: 25; 100 TABLET ORAL at 14:02

## 2020-01-21 RX ADMIN — POLYETHYLENE GLYCOL 3350 SCH GM: 17 POWDER, FOR SOLUTION ORAL at 08:20

## 2020-01-21 RX ADMIN — MIRTAZAPINE SCH MG: 15 TABLET, ORALLY DISINTEGRATING ORAL at 19:51

## 2020-01-21 RX ADMIN — MEMANTINE HYDROCHLORIDE SCH MG: 10 TABLET ORAL at 19:51

## 2020-01-21 RX ADMIN — CARBIDOPA AND LEVODOPA SCH TAB: 25; 100 TABLET ORAL at 19:52

## 2020-01-21 RX ADMIN — TRAZODONE HYDROCHLORIDE SCH MG: 100 TABLET ORAL at 19:52

## 2020-01-21 RX ADMIN — ENTACAPONE SCH MG: 200 TABLET, FILM COATED ORAL at 19:51

## 2020-01-21 RX ADMIN — CITALOPRAM HYDROBROMIDE SCH MG: 20 TABLET ORAL at 08:22

## 2020-01-21 RX ADMIN — FINASTERIDE SCH MG: 5 TABLET, FILM COATED ORAL at 08:22

## 2020-01-21 RX ADMIN — MELOXICAM SCH MG: 7.5 TABLET ORAL at 08:22

## 2020-01-21 RX ADMIN — LACTULOSE SCH GM: 20 SOLUTION ORAL at 08:21

## 2020-01-21 RX ADMIN — RIVASTIGMINE SCH PATCH: 13.3 PATCH, EXTENDED RELEASE TRANSDERMAL at 08:24

## 2020-01-21 NOTE — PN
DATE:  01/20/2020



PSYCHIATRIC PROGRESS NOTE



This late entry 01/20/2020 covers the elements not covered in my initial note.



SUBJECTIVE:  I met with the patient in the evening of 01/20/2020.  Per TARIK Funk,

the patient slept 7 hours previous night.  He has had a good day.  He gets a

little anxious at times, but psychotic symptoms are much improved.  He is not

aggressive.



REVIEW OF SYSTEMS:  No CV, , pulmonary, eye system symptoms on review.



MENTAL STATUS EXAM:  Oriented to himself and situation.  Speech is coherent, has

some latency.  Abstraction fair, computation is impaired, language function

intact, attention span short.  Mood and affect less labile.



LABORATORY DATA:  Reviewed.



IMPRESSION:  Major neurocognitive disorder, Lewy body with delusion, depression,

behavioral disturbance.  Rest unchanged.



PLAN:  Increase a.m. Clozaril from 37.5 mg to 50 mg.  Maintain 50 mg at bedtime.

 Rest unchanged for now.





______________________________

MAN BRANDIN JACOBS MD



DR:  ISABELA/georgina  JOB#:  [id number] / [id number]

DD:  01/21/2020 12:44  DT:  01/21/2020 19:34

## 2020-01-21 NOTE — PDOC
Exam


Note:


Aren Note:


Please also refer to the separate dictated note~for this date of service 

dictated separately.~Patient seen individually. Discussed the patient with 

Nursing staff reviewed the chart.~Reviewed interim history and current 

functioning. Reviewed vital signs,~Labs/ Radiology~and current medications noted

below. Continue current treatment with the changes noted in the dictated 

addendum note





Assessment:


Vital Signs/I&O:





                                   Vital Signs








  Date Time  Temp Pulse Resp B/P (MAP) Pulse Ox O2 Delivery O2 Flow Rate FiO2


 


1/21/20 16:15 97.8 70 16 98/63 (75) 98 Room Air  














                                    I & O   


 


 1/20/20 1/20/20 1/21/20





 15:00 23:00 07:00


 


Intake Total 680 ml 600 ml 


 


Balance 680 ml 600 ml 











Current Medications:


Meds:





Current Medications








 Medications


  (Trade)  Dose


 Ordered  Sig/Elijah


 Route


 PRN Reason  Start Time


 Stop Time Status Last Admin


Dose Admin


 


 Clozapine


  (Clozaril)  50 mg  DAILY


 PO


   1/21/20 09:00


    1/21/20 08:22











I have reviewed the current psychotropics carefully including drug interactions.

 Risk benefit ratio favors no change other than as noted in my dictated progress

note.





Diagnosis:


Problems:  


(1) Anxiety disorder


(2) Impulse control disorder


(3) Lewy body dementia with behavioral disturbance


(4) Major neurocognitive disorder, due to vascular disease, with behavioral 

disturbance, mild











MIRIAM JACOBS MD                 Jan 21, 2020 20:58

## 2020-01-22 VITALS — SYSTOLIC BLOOD PRESSURE: 146 MMHG | DIASTOLIC BLOOD PRESSURE: 82 MMHG

## 2020-01-22 VITALS — SYSTOLIC BLOOD PRESSURE: 112 MMHG | DIASTOLIC BLOOD PRESSURE: 67 MMHG

## 2020-01-22 RX ADMIN — CARBIDOPA AND LEVODOPA SCH TAB: 25; 100 TABLET ORAL at 18:00

## 2020-01-22 RX ADMIN — POLYETHYLENE GLYCOL 3350 SCH GM: 17 POWDER, FOR SOLUTION ORAL at 08:15

## 2020-01-22 RX ADMIN — POLYETHYLENE GLYCOL 3350 SCH GM: 17 POWDER, FOR SOLUTION ORAL at 20:45

## 2020-01-22 RX ADMIN — METOPROLOL SUCCINATE SCH MG: 25 TABLET, EXTENDED RELEASE ORAL at 08:17

## 2020-01-22 RX ADMIN — MEMANTINE HYDROCHLORIDE SCH MG: 10 TABLET ORAL at 20:46

## 2020-01-22 RX ADMIN — ENTACAPONE SCH MG: 200 TABLET, FILM COATED ORAL at 20:44

## 2020-01-22 RX ADMIN — MELATONIN TAB 3 MG SCH MG: 3 TAB at 20:45

## 2020-01-22 RX ADMIN — ENTACAPONE SCH MG: 200 TABLET, FILM COATED ORAL at 08:16

## 2020-01-22 RX ADMIN — MEMANTINE HYDROCHLORIDE SCH MG: 10 TABLET ORAL at 08:15

## 2020-01-22 RX ADMIN — CITALOPRAM HYDROBROMIDE SCH MG: 20 TABLET ORAL at 08:16

## 2020-01-22 RX ADMIN — Medication SCH MCG: at 08:16

## 2020-01-22 RX ADMIN — LACTULOSE SCH GM: 20 SOLUTION ORAL at 08:15

## 2020-01-22 RX ADMIN — MIRTAZAPINE SCH MG: 15 TABLET, ORALLY DISINTEGRATING ORAL at 20:45

## 2020-01-22 RX ADMIN — TRAZODONE HYDROCHLORIDE SCH MG: 100 TABLET ORAL at 20:45

## 2020-01-22 RX ADMIN — CARBIDOPA AND LEVODOPA SCH TAB.SA: 25; 100 TABLET, EXTENDED RELEASE ORAL at 20:45

## 2020-01-22 RX ADMIN — CARBIDOPA AND LEVODOPA SCH TAB: 25; 100 TABLET ORAL at 14:39

## 2020-01-22 RX ADMIN — Medication SCH MG: at 08:17

## 2020-01-22 RX ADMIN — FINASTERIDE SCH MG: 5 TABLET, FILM COATED ORAL at 08:16

## 2020-01-22 RX ADMIN — CARBIDOPA AND LEVODOPA SCH TAB: 25; 100 TABLET ORAL at 10:18

## 2020-01-22 RX ADMIN — RIVASTIGMINE SCH PATCH: 13.3 PATCH, EXTENDED RELEASE TRANSDERMAL at 08:15

## 2020-01-22 RX ADMIN — MELOXICAM SCH MG: 7.5 TABLET ORAL at 08:16

## 2020-01-22 RX ADMIN — CARBIDOPA AND LEVODOPA SCH TAB: 25; 100 TABLET ORAL at 06:25

## 2020-01-22 RX ADMIN — CARBIDOPA AND LEVODOPA SCH TAB: 25; 100 TABLET ORAL at 20:45

## 2020-01-22 NOTE — PDOC
Exam


Note:


Aren Note:


Please also refer to the separate dictated note~for this date of service 

dictated separately.~Patient seen individually. Discussed the patient with 

Nursing staff reviewed the chart.~Reviewed interim history and current 

functioning. Reviewed vital signs,~Labs/ Radiology~and current medications noted

below. Continue current treatment with the changes noted in the dictated 

addendum note





Assessment:


Vital Signs/I&O:





                                   Vital Signs








  Date Time  Temp Pulse Resp B/P (MAP) Pulse Ox O2 Delivery O2 Flow Rate FiO2


 


1/22/20 15:57 98.0 67 16 112/67 (82) 97   


 


1/22/20 05:12      Room Air  














                                    I & O   


 


 1/21/20 1/21/20 1/22/20





 14:59 22:59 06:59


 


Intake Total 1080 ml 720 ml 


 


Balance 1080 ml 720 ml 











Current Medications:


I have reviewed the current psychotropics carefully including drug interactions.

 Risk benefit ratio favors no change other than as noted in my dictated progress

note.





Diagnosis:


Problems:  


(1) Anxiety disorder


(2) Impulse control disorder


(3) Lewy body dementia with behavioral disturbance


(4) Major neurocognitive disorder, due to vascular disease, with behavioral 

disturbance, mild











MIRIAM JACOBS MD                 Jan 22, 2020 20:52

## 2020-01-23 VITALS — SYSTOLIC BLOOD PRESSURE: 133 MMHG | DIASTOLIC BLOOD PRESSURE: 71 MMHG

## 2020-01-23 VITALS — DIASTOLIC BLOOD PRESSURE: 84 MMHG | SYSTOLIC BLOOD PRESSURE: 165 MMHG

## 2020-01-23 RX ADMIN — CARBIDOPA AND LEVODOPA SCH TAB: 25; 100 TABLET ORAL at 20:32

## 2020-01-23 RX ADMIN — LACTULOSE SCH GM: 20 SOLUTION ORAL at 08:17

## 2020-01-23 RX ADMIN — ENTACAPONE SCH MG: 200 TABLET, FILM COATED ORAL at 08:17

## 2020-01-23 RX ADMIN — MEMANTINE HYDROCHLORIDE SCH MG: 10 TABLET ORAL at 20:21

## 2020-01-23 RX ADMIN — Medication SCH MG: at 08:19

## 2020-01-23 RX ADMIN — RIVASTIGMINE SCH PATCH: 13.3 PATCH, EXTENDED RELEASE TRANSDERMAL at 08:18

## 2020-01-23 RX ADMIN — MELATONIN TAB 3 MG SCH MG: 3 TAB at 20:21

## 2020-01-23 RX ADMIN — CARBIDOPA AND LEVODOPA SCH TAB: 25; 100 TABLET ORAL at 14:22

## 2020-01-23 RX ADMIN — POLYETHYLENE GLYCOL 3350 SCH GM: 17 POWDER, FOR SOLUTION ORAL at 08:19

## 2020-01-23 RX ADMIN — Medication SCH MCG: at 08:17

## 2020-01-23 RX ADMIN — CARBIDOPA AND LEVODOPA SCH TAB.SA: 25; 100 TABLET, EXTENDED RELEASE ORAL at 20:21

## 2020-01-23 RX ADMIN — ENTACAPONE SCH MG: 200 TABLET, FILM COATED ORAL at 20:31

## 2020-01-23 RX ADMIN — METOPROLOL SUCCINATE SCH MG: 25 TABLET, EXTENDED RELEASE ORAL at 08:18

## 2020-01-23 RX ADMIN — POLYETHYLENE GLYCOL 3350 SCH GM: 17 POWDER, FOR SOLUTION ORAL at 20:22

## 2020-01-23 RX ADMIN — CHOLECALCIFEROL CAP 1.25 MG (50000 UNIT) SCH UNIT: 1.25 CAP at 08:17

## 2020-01-23 RX ADMIN — TRAZODONE HYDROCHLORIDE SCH MG: 100 TABLET ORAL at 20:21

## 2020-01-23 RX ADMIN — MEMANTINE HYDROCHLORIDE SCH MG: 10 TABLET ORAL at 08:17

## 2020-01-23 RX ADMIN — MIRTAZAPINE SCH MG: 15 TABLET, ORALLY DISINTEGRATING ORAL at 20:21

## 2020-01-23 RX ADMIN — CARBIDOPA AND LEVODOPA SCH TAB: 25; 100 TABLET ORAL at 06:19

## 2020-01-23 RX ADMIN — MELOXICAM SCH MG: 7.5 TABLET ORAL at 08:17

## 2020-01-23 RX ADMIN — FINASTERIDE SCH MG: 5 TABLET, FILM COATED ORAL at 08:18

## 2020-01-23 RX ADMIN — CARBIDOPA AND LEVODOPA SCH TAB: 25; 100 TABLET ORAL at 10:54

## 2020-01-23 RX ADMIN — CARBIDOPA AND LEVODOPA SCH TAB: 25; 100 TABLET ORAL at 17:34

## 2020-01-23 RX ADMIN — CITALOPRAM HYDROBROMIDE SCH MG: 20 TABLET ORAL at 08:18

## 2020-01-23 NOTE — PN
DATE:  01/21/2020



PSYCHIATRIC PROGRESS NOTE



This late entry 01/21/2020 covers elements not covered in my initial note.



SUBJECTIVE:  I met with the patient evening of 01/21/2020.  Per TARIK Grace,

the patient slept 6-1/4 hours previous night.  He has been doing better.  He

called his wife, had a very appropriate conversation with her.  Son visited him

and again the visit went well.  He was not agitated after the family left.  He

was talking about wanting to leave to go to work and then put himself on the

floor.



REVIEW OF SYSTEMS:  No CV, , pulmonary, eye, ENT system symptoms on review.



MENTAL STATUS EXAMINATION:  Oriented to himself and situation.  Speech is

coherent, has some latency.  Abstraction fair, computation impaired, language

function intact, attention span short.  Mood and affect less labile and he is

doing significantly better than about a week back and seems to be responding to

the Clozaril.  Psychotic symptoms much improved.



LABORATORY DATA:  Reviewed.



IMPRESSION:  Major neurocognitive disorder, Lewy body with delusion, depression,

behavioral disturbance; psychotic disorder, unspecified; impulse control

disorder, unspecified.



PLAN:  No change from initial note.  Maintain Clozaril, Nuplazid, Namenda,

Exelon patch, Remeron, Zyprexa as p.r.n., Celexa, melatonin at current dosage.





______________________________

MAN BRANDIN JACOBS MD



DR:  ISABELA/georgina  JOB#:  [id number] / [id number]

DD:  01/22/2020 17:23  DT:  01/22/2020 23:16

## 2020-01-23 NOTE — PDOC
Exam


Note:


Aren Note:


Please also refer to the separate dictated note~for this date of service 

dictated separately.~Patient seen individually. Discussed the patient with 

Nursing staff reviewed the chart.~Reviewed interim history and current 

functioning. Reviewed vital signs,~Labs/ Radiology~and current medications noted

below. Continue current treatment with the changes noted in the dictated 

addendum note





Assessment:


Vital Signs/I&O:





                                   Vital Signs








  Date Time  Temp Pulse Resp B/P (MAP) Pulse Ox O2 Delivery O2 Flow Rate FiO2


 


1/23/20 16:15 98.3 63 16 133/71 (91) 97   


 


1/23/20 06:36      Room Air  














                                    I & O   


 


 1/22/20 1/22/20 1/23/20





 15:00 23:00 07:00


 


Intake Total 960 ml 600 ml 


 


Balance 960 ml 600 ml 











Current Medications:


I have reviewed the current psychotropics carefully including drug interactions.

 Risk benefit ratio favors no change other than as noted in my dictated progress

note.





Diagnosis:


Problems:  


(1) Anxiety disorder


(2) Impulse control disorder


(3) Lewy body dementia with behavioral disturbance


(4) Major neurocognitive disorder, due to vascular disease, with behavioral 

disturbance, mild











MIRIAM JACOBS MD                 Jan 23, 2020 21:03
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
## 2020-01-23 NOTE — PN
DATE:  01/22/2020



PSYCHIATRIC PROGRESS NOTE



This late entry of 01/22 covers elements not covered in my initial note.



SUBJECTIVE:  I met with the patient on the evening of 01/22.  Per TARIK Castro,

the patient slept 7-1/2 hours the previous night.  Previous evening, he was

appropriately questioning the nursing staff on his medications, seems much more

oriented.  He was complaining of some muscle stiffness, but on examination this

is clinically insignificant.  Behaviorally, he has been doing much better.  No

clear psychotic symptoms.  No aggression.



REVIEW OF SYSTEMS:  Other than above, no CV, , pulmonary, eye, ENT system

symptoms on review.



MENTAL STATUS EXAMINATION:  Oriented to himself and situation.  Speech has some

latency, coherent, pleasant, smiling as I met with him.  Abstraction fair,

computation impaired, language function intact, attention span short.  Mood and

affect is improved.  Again, no aggression noted.



LABORATORY DATA:  Reviewed.



IMPRESSION:  Psychotic disorder, unspecified; major neurocognitive disorder,

Lewy body with delusions, history of behavioral disturbance.  Rest unchanged.



PLAN:  Continue current psychotropics including Clozaril, which has been

increased.  He also remains on Nuplazid, Ativan p.r.n., trazodone, melatonin,

Celexa, Zyprexa p.r.n., Remeron, Exelon patch, Namenda.





______________________________

MAN BRANDIN JACOBS MD



DR:  ISABELA/georgina  JOB#:  [id number] / [id number]

DD:  01/23/2020 16:08  DT:  01/23/2020 21:42

## 2020-01-24 VITALS — SYSTOLIC BLOOD PRESSURE: 133 MMHG | DIASTOLIC BLOOD PRESSURE: 71 MMHG

## 2020-01-24 VITALS — DIASTOLIC BLOOD PRESSURE: 85 MMHG | SYSTOLIC BLOOD PRESSURE: 137 MMHG

## 2020-01-24 RX ADMIN — CARBIDOPA AND LEVODOPA SCH TAB: 25; 100 TABLET ORAL at 13:10

## 2020-01-24 RX ADMIN — CITALOPRAM HYDROBROMIDE SCH MG: 20 TABLET ORAL at 08:12

## 2020-01-24 RX ADMIN — POLYETHYLENE GLYCOL 3350 SCH GM: 17 POWDER, FOR SOLUTION ORAL at 08:11

## 2020-01-24 RX ADMIN — CARBIDOPA AND LEVODOPA SCH TAB: 25; 100 TABLET ORAL at 05:56

## 2020-01-24 RX ADMIN — Medication SCH MCG: at 08:14

## 2020-01-24 RX ADMIN — MEMANTINE HYDROCHLORIDE SCH MG: 10 TABLET ORAL at 08:12

## 2020-01-24 RX ADMIN — MEMANTINE HYDROCHLORIDE SCH MG: 10 TABLET ORAL at 20:00

## 2020-01-24 RX ADMIN — ENTACAPONE SCH MG: 200 TABLET, FILM COATED ORAL at 08:13

## 2020-01-24 RX ADMIN — MIRTAZAPINE SCH MG: 15 TABLET, ORALLY DISINTEGRATING ORAL at 20:01

## 2020-01-24 RX ADMIN — LACTULOSE SCH GM: 20 SOLUTION ORAL at 08:11

## 2020-01-24 RX ADMIN — CARBIDOPA AND LEVODOPA SCH TAB: 25; 100 TABLET ORAL at 20:00

## 2020-01-24 RX ADMIN — CARBIDOPA AND LEVODOPA SCH TAB: 25; 100 TABLET ORAL at 10:00

## 2020-01-24 RX ADMIN — CARBIDOPA AND LEVODOPA SCH TAB: 25; 100 TABLET ORAL at 17:25

## 2020-01-24 RX ADMIN — TRAZODONE HYDROCHLORIDE SCH MG: 100 TABLET ORAL at 20:01

## 2020-01-24 RX ADMIN — MELATONIN TAB 3 MG SCH MG: 3 TAB at 20:00

## 2020-01-24 RX ADMIN — MELOXICAM SCH MG: 7.5 TABLET ORAL at 08:12

## 2020-01-24 RX ADMIN — Medication SCH MG: at 08:11

## 2020-01-24 RX ADMIN — CARBIDOPA AND LEVODOPA SCH TAB.SA: 25; 100 TABLET, EXTENDED RELEASE ORAL at 20:00

## 2020-01-24 RX ADMIN — ENTACAPONE SCH MG: 200 TABLET, FILM COATED ORAL at 20:00

## 2020-01-24 RX ADMIN — FINASTERIDE SCH MG: 5 TABLET, FILM COATED ORAL at 08:12

## 2020-01-24 RX ADMIN — RIVASTIGMINE SCH PATCH: 13.3 PATCH, EXTENDED RELEASE TRANSDERMAL at 08:13

## 2020-01-24 RX ADMIN — METOPROLOL SUCCINATE SCH MG: 25 TABLET, EXTENDED RELEASE ORAL at 08:12

## 2020-01-24 RX ADMIN — POLYETHYLENE GLYCOL 3350 SCH GM: 17 POWDER, FOR SOLUTION ORAL at 20:00

## 2020-01-24 NOTE — PDOC
Exam


Note:


Aren Note:


Please also refer to the separate dictated note~for this date of service 

dictated separately.~Patient seen individually. Discussed the patient with 

Nursing staff reviewed the chart.~Reviewed interim history and current 

functioning. Reviewed vital signs,~Labs/ Radiology~and current medications noted

below. Continue current treatment with the changes noted in the dictated 

addendum note





Assessment:


Vital Signs/I&O:





                                   Vital Signs








  Date Time  Temp Pulse Resp B/P (MAP) Pulse Ox O2 Delivery O2 Flow Rate FiO2


 


1/24/20 15:59 97.8 69 18 137/85 (102) 97   


 


1/24/20 10:18      Room Air  














                                    I & O   


 


 1/23/20 1/23/20 1/24/20





 15:00 23:00 07:00


 


Intake Total 960 ml 360 ml 240 ml


 


Balance 960 ml 360 ml 240 ml











Current Medications:


I have reviewed the current psychotropics carefully including drug interactions.

 Risk benefit ratio favors no change other than as noted in my dictated progress

note.





Diagnosis:


Problems:  


(1) Anxiety disorder


(2) Impulse control disorder


(3) Lewy body dementia with behavioral disturbance


(4) Major neurocognitive disorder, due to vascular disease, with behavioral 

disturbance, mild











MIRIAM JACOBS MD                 Jan 24, 2020 21:02

## 2020-01-25 VITALS — SYSTOLIC BLOOD PRESSURE: 139 MMHG | DIASTOLIC BLOOD PRESSURE: 78 MMHG

## 2020-01-25 VITALS — SYSTOLIC BLOOD PRESSURE: 131 MMHG | DIASTOLIC BLOOD PRESSURE: 75 MMHG

## 2020-01-25 RX ADMIN — CARBIDOPA AND LEVODOPA SCH TAB: 25; 100 TABLET ORAL at 10:00

## 2020-01-25 RX ADMIN — MEMANTINE HYDROCHLORIDE SCH MG: 10 TABLET ORAL at 08:03

## 2020-01-25 RX ADMIN — POLYETHYLENE GLYCOL 3350 SCH GM: 17 POWDER, FOR SOLUTION ORAL at 20:05

## 2020-01-25 RX ADMIN — MELOXICAM SCH MG: 7.5 TABLET ORAL at 08:03

## 2020-01-25 RX ADMIN — CITALOPRAM HYDROBROMIDE SCH MG: 20 TABLET ORAL at 08:02

## 2020-01-25 RX ADMIN — CARBIDOPA AND LEVODOPA SCH TAB: 25; 100 TABLET ORAL at 18:00

## 2020-01-25 RX ADMIN — ENTACAPONE SCH MG: 200 TABLET, FILM COATED ORAL at 08:02

## 2020-01-25 RX ADMIN — CARBIDOPA AND LEVODOPA SCH TAB: 25; 100 TABLET ORAL at 14:00

## 2020-01-25 RX ADMIN — CARBIDOPA AND LEVODOPA SCH TAB.SA: 25; 100 TABLET, EXTENDED RELEASE ORAL at 20:06

## 2020-01-25 RX ADMIN — TRAZODONE HYDROCHLORIDE SCH MG: 100 TABLET ORAL at 20:05

## 2020-01-25 RX ADMIN — POLYETHYLENE GLYCOL 3350 SCH GM: 17 POWDER, FOR SOLUTION ORAL at 08:01

## 2020-01-25 RX ADMIN — CARBIDOPA AND LEVODOPA SCH TAB: 25; 100 TABLET ORAL at 20:06

## 2020-01-25 RX ADMIN — MEMANTINE HYDROCHLORIDE SCH MG: 10 TABLET ORAL at 20:06

## 2020-01-25 RX ADMIN — Medication SCH MG: at 08:03

## 2020-01-25 RX ADMIN — CARBIDOPA AND LEVODOPA SCH TAB: 25; 100 TABLET ORAL at 06:00

## 2020-01-25 RX ADMIN — MELATONIN TAB 3 MG SCH MG: 3 TAB at 20:05

## 2020-01-25 RX ADMIN — MIRTAZAPINE SCH MG: 15 TABLET, ORALLY DISINTEGRATING ORAL at 20:06

## 2020-01-25 RX ADMIN — LACTULOSE SCH GM: 20 SOLUTION ORAL at 08:01

## 2020-01-25 RX ADMIN — FINASTERIDE SCH MG: 5 TABLET, FILM COATED ORAL at 08:02

## 2020-01-25 RX ADMIN — RIVASTIGMINE SCH PATCH: 13.3 PATCH, EXTENDED RELEASE TRANSDERMAL at 08:01

## 2020-01-25 RX ADMIN — ENTACAPONE SCH MG: 200 TABLET, FILM COATED ORAL at 20:06

## 2020-01-25 RX ADMIN — Medication SCH MCG: at 08:02

## 2020-01-25 RX ADMIN — METOPROLOL SUCCINATE SCH MG: 25 TABLET, EXTENDED RELEASE ORAL at 08:02

## 2020-01-25 NOTE — PDOC
Exam


Note:


Aren Note:


Please also refer to the separate dictated note~for this date of service 

dictated separately.~Patient seen individually. Discussed the patient with 

Nursing staff reviewed the chart.~Reviewed interim history and current 

functioning. Reviewed vital signs,~Labs/ Radiology~and current medications noted

below. Continue current treatment with the changes noted in the dictated 

addendum note





Assessment:


Vital Signs/I&O:





                                   Vital Signs








  Date Time  Temp Pulse Resp B/P (MAP) Pulse Ox O2 Delivery O2 Flow Rate FiO2


 


1/25/20 16:02 98.0 70 20 131/75 (93) 97   


 


1/24/20 10:18      Room Air  














                                    I & O   


 


 1/24/20 1/24/20 1/25/20





 15:00 23:00 07:00


 


Intake Total 1320 ml 720 ml 


 


Balance 1320 ml 720 ml 











Current Medications:


I have reviewed the current psychotropics carefully including drug interactions.

 Risk benefit ratio favors no change other than as noted in my dictated progress

note.





Diagnosis:


Problems:  


(1) Anxiety disorder


(2) Impulse control disorder


(3) Lewy body dementia with behavioral disturbance


(4) Major neurocognitive disorder, due to vascular disease, with behavioral 

disturbance, mild











MIRIAM JACOBS MD                 Jan 25, 2020 22:34

## 2020-01-26 VITALS — DIASTOLIC BLOOD PRESSURE: 60 MMHG | SYSTOLIC BLOOD PRESSURE: 150 MMHG

## 2020-01-26 VITALS — DIASTOLIC BLOOD PRESSURE: 98 MMHG | SYSTOLIC BLOOD PRESSURE: 148 MMHG

## 2020-01-26 RX ADMIN — MEMANTINE HYDROCHLORIDE SCH MG: 10 TABLET ORAL at 19:57

## 2020-01-26 RX ADMIN — POLYETHYLENE GLYCOL 3350 SCH GM: 17 POWDER, FOR SOLUTION ORAL at 19:55

## 2020-01-26 RX ADMIN — MEMANTINE HYDROCHLORIDE SCH MG: 10 TABLET ORAL at 07:51

## 2020-01-26 RX ADMIN — ENTACAPONE SCH MG: 200 TABLET, FILM COATED ORAL at 07:50

## 2020-01-26 RX ADMIN — LACTULOSE SCH GM: 20 SOLUTION ORAL at 07:54

## 2020-01-26 RX ADMIN — CARBIDOPA AND LEVODOPA SCH TAB: 25; 100 TABLET ORAL at 19:58

## 2020-01-26 RX ADMIN — CITALOPRAM HYDROBROMIDE SCH MG: 20 TABLET ORAL at 07:52

## 2020-01-26 RX ADMIN — FINASTERIDE SCH MG: 5 TABLET, FILM COATED ORAL at 07:51

## 2020-01-26 RX ADMIN — CARBIDOPA AND LEVODOPA SCH TAB: 25; 100 TABLET ORAL at 18:00

## 2020-01-26 RX ADMIN — Medication SCH MCG: at 07:53

## 2020-01-26 RX ADMIN — TRAZODONE HYDROCHLORIDE SCH MG: 100 TABLET ORAL at 19:57

## 2020-01-26 RX ADMIN — CARBIDOPA AND LEVODOPA SCH TAB: 25; 100 TABLET ORAL at 14:00

## 2020-01-26 RX ADMIN — RIVASTIGMINE SCH PATCH: 13.3 PATCH, EXTENDED RELEASE TRANSDERMAL at 07:53

## 2020-01-26 RX ADMIN — MELATONIN TAB 3 MG SCH MG: 3 TAB at 19:56

## 2020-01-26 RX ADMIN — ENTACAPONE SCH MG: 200 TABLET, FILM COATED ORAL at 19:55

## 2020-01-26 RX ADMIN — Medication SCH MG: at 09:00

## 2020-01-26 RX ADMIN — MELOXICAM SCH MG: 7.5 TABLET ORAL at 07:51

## 2020-01-26 RX ADMIN — POLYETHYLENE GLYCOL 3350 SCH GM: 17 POWDER, FOR SOLUTION ORAL at 07:50

## 2020-01-26 RX ADMIN — CARBIDOPA AND LEVODOPA SCH TAB: 25; 100 TABLET ORAL at 06:12

## 2020-01-26 RX ADMIN — CARBIDOPA AND LEVODOPA SCH TAB: 25; 100 TABLET ORAL at 10:00

## 2020-01-26 RX ADMIN — MIRTAZAPINE SCH MG: 15 TABLET, ORALLY DISINTEGRATING ORAL at 19:55

## 2020-01-26 RX ADMIN — CARBIDOPA AND LEVODOPA SCH TAB.SA: 25; 100 TABLET, EXTENDED RELEASE ORAL at 19:58

## 2020-01-26 RX ADMIN — METOPROLOL SUCCINATE SCH MG: 25 TABLET, EXTENDED RELEASE ORAL at 07:53

## 2020-01-26 NOTE — PN
DATE:  01/23/2020



PSYCHIATRIC PROGRESS NOTE



This late entry of January 23 covers elements not covered in my initial note.



SUBJECTIVE:  I met with the patient evening of January 23.  The patient was also

staffed at a treatment team meeting with the entire team in the morning, along

with the patient's son, Jerry, who attended the conference and staff from [facility] including María who was a representative of Rupal Ma and Susu Santo.  The patient is sleeping average 7 hours, appetite %. 

Generally, he is doing much better.  The psychosis which caused dramatic

aggression, disruptive behavior, volatile behavior, breaking down several door

handles following admission, running up and down the hallway, all seem to have

subsided and have remained fairly stable for the last several days.  He did have

fairly significant response to the Clozaril as an antipsychotic and I have

increased it further just to make sure that once he returns to the facility his

psychotic symptoms remain subsided.  He has had several telephone calls with his

wife and responded well to this, but in the past visits with her wife resulted

in worsening of his behaviors; psychosis.  The son is agreeable to not having

the wife visit him at the nursing home since this was a concern expressed from

the staff at the nursing [place].



REVIEW OF SYSTEMS:  No CV, , pulmonary, eye, ENT system symptoms on review.



MENTAL STATUS EXAM:  Oriented to himself and situation.  Speech is coherent. 

Abstraction fair.  Computation impaired.  Language function intact.  Attention

span short.  Mood and affect much improved.



LABORATORY DATA:  Reviewed.



IMPRESSION:  Psychotic disorder, unspecified; major neurocognitive disorder;

early Lewy body with delusion; behavioral disturbance, rule out bipolar

disorder, unspecified.  Rest unchanged.



PLAN:  Continue current psychotropics, Nuplazid 34 mg a day, Clozaril 50 mg

b.i.d., Ativan p.r.n., trazodone 100 mg at bedtime plus p.r.n., melatonin 6 mg

at bedtime, Celexa 20 mg a day, Zyprexa p.r.n., Remeron 7.5 mg at bedtime,

Exelon patch 13.3 mg daily and Namenda 10 b.i.d.,  Adjust further as clinically

indicated.  Possible transition back to nursing home on 01/27 after the weekend.





______________________________

MAN BRANDIN JACOBS MD



DR:  Marilee  JOB#:  [id number] / [id number]

DD:  01/26/2020 11:49  DT:  01/26/2020 18:42

## 2020-01-26 NOTE — PDOC
Exam


Note:


Aren Note:


Please also refer to the separate dictated note~for this date of service 

dictated separately.~Patient seen individually. Discussed the patient with 

Nursing staff reviewed the chart.~Reviewed interim history and current 

functioning. Reviewed vital signs,~Labs/ Radiology~and current medications noted

below. Continue current treatment with the changes noted in the dictated 

addendum note





Assessment:


Vital Signs/I&O:





                                   Vital Signs








  Date Time  Temp Pulse Resp B/P (MAP) Pulse Ox O2 Delivery O2 Flow Rate FiO2


 


1/26/20 16:13 97.6 84 18 150/60 (90) 98   


 


1/24/20 10:18      Room Air  














                                    I & O   


 


 1/25/20 1/25/20 1/26/20





 15:00 23:00 07:00


 


Intake Total 1140 ml 1260 ml 


 


Balance 1140 ml 1260 ml 











Current Medications:


I have reviewed the current psychotropics carefully including drug interactions.

 Risk benefit ratio favors no change other than as noted in my dictated progress

note.





Diagnosis:


Problems:  


(1) Anxiety disorder


(2) Impulse control disorder


(3) Lewy body dementia with behavioral disturbance


(4) Major neurocognitive disorder, due to vascular disease, with behavioral 

disturbance, mild











MIRIAM JACOBS MD                 Jan 26, 2020 20:52

## 2020-01-27 VITALS — DIASTOLIC BLOOD PRESSURE: 78 MMHG | SYSTOLIC BLOOD PRESSURE: 147 MMHG

## 2020-01-27 LAB
ALBUMIN SERPL-MCNC: 3.1 G/DL (ref 3.4–5)
ALBUMIN/GLOB SERPL: 1 {RATIO} (ref 1–1.7)
ALP SERPL-CCNC: 72 U/L (ref 46–116)
ALT SERPL-CCNC: 9 U/L (ref 16–63)
ANION GAP SERPL CALC-SCNC: 6 MMOL/L (ref 6–14)
AST SERPL-CCNC: 16 U/L (ref 15–37)
BASOPHILS # BLD AUTO: 0.1 X10^3/UL (ref 0–0.2)
BASOPHILS NFR BLD: 1 % (ref 0–3)
BILIRUB SERPL-MCNC: 0.2 MG/DL (ref 0.2–1)
BUN/CREAT SERPL: 33 (ref 6–20)
CA-I SERPL ISE-MCNC: 30 MG/DL (ref 8–26)
CALCIUM SERPL-MCNC: 8.6 MG/DL (ref 8.5–10.1)
CHLORIDE SERPL-SCNC: 109 MMOL/L (ref 98–107)
CO2 SERPL-SCNC: 30 MMOL/L (ref 21–32)
CREAT SERPL-MCNC: 0.9 MG/DL (ref 0.7–1.3)
EOSINOPHIL NFR BLD: 0.6 X10^3/UL (ref 0–0.7)
EOSINOPHIL NFR BLD: 11 % (ref 0–3)
ERYTHROCYTE [DISTWIDTH] IN BLOOD BY AUTOMATED COUNT: 13.8 % (ref 11.5–14.5)
GFR SERPLBLD BASED ON 1.73 SQ M-ARVRAT: 84.2 ML/MIN
GLOBULIN SER-MCNC: 3.1 G/DL (ref 2.2–3.8)
GLUCOSE SERPL-MCNC: 91 MG/DL (ref 70–99)
HCT VFR BLD CALC: 38.5 % (ref 39–53)
HGB BLD-MCNC: 12.7 G/DL (ref 13–17.5)
LYMPHOCYTES # BLD: 1.3 X10^3/UL (ref 1–4.8)
LYMPHOCYTES NFR BLD AUTO: 22 % (ref 24–48)
MCH RBC QN AUTO: 29 PG (ref 25–35)
MCHC RBC AUTO-ENTMCNC: 33 G/DL (ref 31–37)
MCV RBC AUTO: 89 FL (ref 79–100)
MONO #: 0.4 X10^3/UL (ref 0–1.1)
MONOCYTES NFR BLD: 7 % (ref 0–9)
NEUT #: 3.6 X10^3UL (ref 1.8–7.7)
NEUTROPHILS NFR BLD AUTO: 60 % (ref 31–73)
PLATELET # BLD AUTO: 247 X10^3/UL (ref 140–400)
POTASSIUM SERPL-SCNC: 4.4 MMOL/L (ref 3.5–5.1)
PROT SERPL-MCNC: 6.2 G/DL (ref 6.4–8.2)
RBC # BLD AUTO: 4.35 X10^6/UL (ref 4.3–5.7)
SODIUM SERPL-SCNC: 145 MMOL/L (ref 136–145)
WBC # BLD AUTO: 6 X10^3/UL (ref 4–11)

## 2020-01-27 RX ADMIN — CITALOPRAM HYDROBROMIDE SCH MG: 20 TABLET ORAL at 08:07

## 2020-01-27 RX ADMIN — Medication SCH MG: at 08:09

## 2020-01-27 RX ADMIN — LACTULOSE SCH GM: 20 SOLUTION ORAL at 08:06

## 2020-01-27 RX ADMIN — RIVASTIGMINE SCH PATCH: 13.3 PATCH, EXTENDED RELEASE TRANSDERMAL at 08:09

## 2020-01-27 RX ADMIN — ENTACAPONE SCH MG: 200 TABLET, FILM COATED ORAL at 08:07

## 2020-01-27 RX ADMIN — POLYETHYLENE GLYCOL 3350 SCH GM: 17 POWDER, FOR SOLUTION ORAL at 08:06

## 2020-01-27 RX ADMIN — Medication SCH MCG: at 08:07

## 2020-01-27 RX ADMIN — METOPROLOL SUCCINATE SCH MG: 25 TABLET, EXTENDED RELEASE ORAL at 08:08

## 2020-01-27 RX ADMIN — CARBIDOPA AND LEVODOPA SCH TAB: 25; 100 TABLET ORAL at 09:55

## 2020-01-27 RX ADMIN — MELOXICAM SCH MG: 7.5 TABLET ORAL at 08:07

## 2020-01-27 RX ADMIN — FINASTERIDE SCH MG: 5 TABLET, FILM COATED ORAL at 08:07

## 2020-01-27 RX ADMIN — CARBIDOPA AND LEVODOPA SCH TAB: 25; 100 TABLET ORAL at 05:49

## 2020-01-27 RX ADMIN — MEMANTINE HYDROCHLORIDE SCH MG: 10 TABLET ORAL at 08:08

## 2020-01-27 NOTE — PN
DATE:  01/24/2020



PSYCHIATRIC PROGRESS NOTE



This late entry 01/24/2020 covers the elements not covered in my initial note.



SUBJECTIVE:  I met with the patient in the evening of 01/24/2020.  Per TARIK Chung, the patient slept 7-1/2 hours previous night.  He has done much better

during the day.  No aggression.  Much less psychotic and mood vacillations

present previously appeared to have subsided nicely.  After lunch, he was tired,

somewhat unsteady in his gait.  BP was stable.



REVIEW OF SYSTEMS:  No CV, , pulmonary, eye, ENT system symptoms on review.



MENTAL STATUS EXAM:  Reasonably oriented.  Speech has some latency, coherent. 

Abstraction fair, computation somewhat impaired, language function intact,

attention span fair.  Mood and affect is improved.



LABORATORY DATA:  Reviewed.



IMPRESSION:  Probable major neurocognitive disorder, Lewy body with delusion,

behavioral disturbance, rule out bipolar disorder, mixed with psychotic

features, in partial remission; anxiety disorder, unspecified.  Rest unchanged.



PLAN:  Continue current psychotropics.  I feel that Clozaril has helped him

significantly and we have been able to discontinue many of his other

psychotropics as a consequence of this.  I will continue at current dosage,

monitor weekly CBC, absolute neutrophil counts, and occasional chemistry profile

including a fasting blood glucose.  Rest unchanged.





______________________________

MAN BRANDIN JACOBS MD



DR:  ISABELA/georgina  JOB#:  [id number] / [id number]

DD:  01/26/2020 17:38  DT:  01/27/2020 00:11

## 2020-01-27 NOTE — DS
DATE OF DISCHARGE:  01/27/2020



PSYCHIATRIC PROGRESS NOTE



This note covers elements not covered in my initial note.



REASON FOR ADMISSION:  Please refer to the admission history for details. 

Briefly, the patient is a 69-year-old  male referred to us from [facility], referred by his primary care physician, Dr. Agrawal on

account of extremely disruptive, aggressive, volatile behavior, disrupting the

entire unit at the nursing facility.  Reportedly, the patient has a diagnosis of

Lewy body dementia in addition to his Parkinson's and was extremely psychotic

and aggressive and dangerous in his behaviors.



SIGNIFICANT FINDINGS AND CLINICAL COURSE:  Following admission, the patient was

seen daily individually by myself from a psychiatric standpoint including

weekends.  Medical followup with Dr. Anne.  Following admission, he was exit

seeking, threatening with fist, yelling profanities, breaking down doors and

broke down several handles of the secure unit door.  He would run up and down

the hallway, extremely paranoid, felt people were after him.  He was also

delusional that his wife was cheating on him, that he was being poisoned, that

his wife was  him.  He was an extreme challenge for us.  Security had

to be called as well.  Multiple changes in his psychotropics were initiated

including intramuscular daily.  Haldol and Ativan was used because of

nonresponse to oral psychotropics and his extremely dangerous behaviors despite

his diagnosis of Parkinson's, we initiated the above.  Depakote was added. 

After multiple diligent trials on various psychotropics, he seemed to respond to

a combination of Clozaril, which was gradually increased to 50 mg b.i.d.;

Nuplazid 34 mg daily was continued; Ativan p.r.n.; trazodone 100 mg at bedtime,

may repeat x 1; melatonin 6 mg at bedtime; Celexa 20 mg a day; Zyprexa p.r.n.;

Remeron 7.5 mg at bedtime; Exelon patch 13.3 mg a day; Namenda 10 mg b.i.d. 

Once he was stabilized, he was cognitively very intact and appropriate in his

interactions with no psychotic symptoms for several days prior to discharge. 

There was no aggression whatsoever.  In fact, he handled telephone calls with

his wife very well and generally seemed to have stabilized after a very

tumultuous course during this hospitalization.  He tolerated the Clozaril well.



REVIEW OF SYSTEMS:  Prior to discharge on 01/27/2020, no CV, , PULMONARY, EYE,

ENT system symptoms on review.



MENTAL STATUS EXAM:  Oriented to himself and situation.  Speech is coherent, has

some latency.  Abstraction fair, computation impaired, language function intact.

 He does have short term memory deficits.  No clear psychotic symptoms, suicidal

or homicidal ideation.  Attention span improved.



LABORATORY DATA:  Reviewed.



CONDITION AT DISCHARGE:  Improved.



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

with psychotic features.  Probable major neurocognitive disorder; Lewy body with

delusion; depression; behavioral disturbance; anxiety disorder, unspecified;

impulse control disorder, unspecified.  Rest unchanged from admission.



DISCHARGE MEDICATIONS:  Please refer to the MRAD.  Outpatient psychiatric and

medical followup at the nursing home.  He should have weekly CBC, absolute

neutrophil counts since he is on Clozaril and monthly fasting chemistry profile.



Time for discharge management greater than 30 minutes.





______________________________

MIRIAM JACOBS MD



DR:  ISABELA/georgina  JOB#:  [id number] / [id number]

DD:  01/27/2020 17:31  DT:  01/27/2020 21:27

## 2020-01-27 NOTE — PDOC
Exam


Note:


Aren Note:


Please also refer to the separate dictated note~for this date of service 

dictated separately.~Patient seen individually. Discussed the patient with 

Nursing staff reviewed the chart.~Reviewed interim history and current 

functioning. Reviewed vital signs,~Labs/ Radiology~and current medications noted

below. Continue current treatment with the changes noted in the dictated 

addendum note





Assessment:


Vital Signs/I&O:





                                   Vital Signs








  Date Time  Temp Pulse Resp B/P (MAP) Pulse Ox O2 Delivery O2 Flow Rate FiO2


 


1/27/20 08:08  51  147/78    


 


1/27/20 06:06 97.9  14  97   


 


1/24/20 10:18      Room Air  














                                    I & O   


 


 1/26/20 1/26/20 1/27/20





 15:00 23:00 07:00


 


Intake Total 480 ml 960 ml 


 


Balance 480 ml 960 ml 








Labs:





                                Laboratory Tests








Test


 1/27/20


06:38


 


White Blood Count


 6.0 x10^3/uL


(4.0-11.0)


 


Red Blood Count


 4.35 x10^6/uL


(4.30-5.70)


 


Hemoglobin


 12.7 g/dL


(13.0-17.5)  L


 


Hematocrit


 38.5 %


(39.0-53.0)  L


 


Mean Corpuscular Volume


 89 fL ()





 


Mean Corpuscular Hemoglobin 29 pg (25-35)  


 


Mean Corpuscular Hemoglobin


Concent 33 g/dL


(31-37)


 


Red Cell Distribution Width


 13.8 %


(11.5-14.5)


 


Platelet Count


 247 x10^3/uL


(140-400)


 


Neutrophils (%) (Auto) 60 % (31-73)  


 


Lymphocytes (%) (Auto) 22 % (24-48)  L


 


Monocytes (%) (Auto) 7 % (0-9)  


 


Eosinophils (%) (Auto) 11 % (0-3)  H


 


Basophils (%) (Auto) 1 % (0-3)  


 


Neutrophils # (Auto)


 3.6 x10^3uL


(1.8-7.7)


 


Lymphocytes # (Auto)


 1.3 x10^3/uL


(1.0-4.8)


 


Monocytes # (Auto)


 0.4 x10^3/uL


(0.0-1.1)


 


Eosinophils # (Auto)


 0.6 x10^3/uL


(0.0-0.7)


 


Basophils # (Auto)


 0.1 x10^3/uL


(0.0-0.2)


 


Sodium Level


 145 mmol/L


(136-145)


 


Potassium Level


 4.4 mmol/L


(3.5-5.1)


 


Chloride Level


 109 mmol/L


()  H


 


Carbon Dioxide Level


 30 mmol/L


(21-32)


 


Anion Gap 6 (6-14)  


 


Blood Urea Nitrogen


 30 mg/dL


(8-26)  H


 


Creatinine


 0.9 mg/dL


(0.7-1.3)


 


Estimated GFR


(Cockcroft-Gault) 84.2  





 


BUN/Creatinine Ratio 33 (6-20)  H


 


Glucose Level


 91 mg/dL


(70-99)


 


Calcium Level


 8.6 mg/dL


(8.5-10.1)


 


Total Bilirubin


 0.2 mg/dL


(0.2-1.0)


 


Aspartate Amino Transferase


(AST) 16 U/L (15-37)





 


Alanine Aminotransferase (ALT)


 9 U/L (16-63)


L


 


Alkaline Phosphatase


 72 U/L


()


 


Total Protein


 6.2 g/dL


(6.4-8.2)  L


 


Albumin


 3.1 g/dL


(3.4-5.0)  L


 


Albumin/Globulin Ratio 1.0 (1.0-1.7)  











Current Medications:


I have reviewed the current psychotropics carefully including drug interactions.

 Risk benefit ratio favors no change other than as noted in my dictated progress

note.





Diagnosis:


Problems:  


(1) Anxiety disorder


(2) Impulse control disorder


(3) Lewy body dementia with behavioral disturbance


(4) Major neurocognitive disorder, due to vascular disease, with behavioral 

disturbance, mild











MIRIAM JACOBS MD                 Jan 27, 2020 21:28

## 2020-01-28 NOTE — PN
DATE:  01/26/2020



PSYCHIATRIC PROGRESS NOTE



This late entry 01/26/2020 covers elements not covered in my initial note.



SUBJECTIVE:  I met with the patient in the evening.  Per TARIK Mcdowell, the patient

slept 6-1/4 hours previous night.  He has done much better, very appropriate,

not aggressive.  No clear psychotic symptoms.  Memory appears better as well.



REVIEW OF SYSTEMS:  Some impairment of ambulation.  No CV, , pulmonary, eye

system symptoms on review.



MENTAL STATUS EXAM:  Reasonably oriented to himself and situation.  Speech is

coherent, abstraction fair, computation impaired, language function intact,

attention span short.  Mood and affect is improved.



LABORATORY DATA:  Reviewed.



IMPRESSION:  Unchanged from initial note.



PLAN:  No change from initial note.





______________________________

MAN BRANDIN JACOBS MD



DR:  ISABELA/georgina  JOB#:  [id number] / [id number]

DD:  01/27/2020 17:26  DT:  01/28/2020 01:36

## 2020-01-28 NOTE — PN
DATE:  01/25/2020



PSYCHIATRIC PROGRESS NOTE



This late entry 01/25/2020 covers elements not covered in my initial note.



SUBJECTIVE:  I met with the patient in the evening of 01/25/2020.  The patient

slept 6-3/4 hours previous night.  He has been doing much better per nursing

report.  He seems cognitively much more intact, appropriate in his interactions.



REVIEW OF SYSTEMS:  No CV, , pulmonary, eye system symptoms on review.



MENTAL STATUS EXAM:  Reasonably oriented to place and situation.  Speech has

some latency, coherent.  Abstraction fair, computation impaired, language

function intact.  Mood and affect somewhat withdrawn at times.



LABORATORY DATA:  Reviewed.



IMPRESSION:  Unchanged from initial note.



PLAN:  No change from initial note.





______________________________

MAN BRANDIN JACOBS MD



DR:  ISABELA/georgina  JOB#:  [id number] / [id number]

DD:  01/27/2020 17:25  DT:  01/28/2020 01:31